# Patient Record
Sex: MALE | Race: WHITE | Employment: OTHER | URBAN - METROPOLITAN AREA
[De-identification: names, ages, dates, MRNs, and addresses within clinical notes are randomized per-mention and may not be internally consistent; named-entity substitution may affect disease eponyms.]

---

## 2022-03-11 ENCOUNTER — APPOINTMENT (OUTPATIENT)
Dept: GENERAL RADIOLOGY | Age: 87
DRG: 087 | End: 2022-03-11
Payer: MEDICARE

## 2022-03-11 ENCOUNTER — APPOINTMENT (OUTPATIENT)
Dept: CT IMAGING | Age: 87
DRG: 087 | End: 2022-03-11
Payer: MEDICARE

## 2022-03-11 ENCOUNTER — HOSPITAL ENCOUNTER (INPATIENT)
Age: 87
LOS: 2 days | Discharge: HOME | DRG: 087 | End: 2022-03-13
Attending: EMERGENCY MEDICINE | Admitting: SURGERY
Payer: MEDICARE

## 2022-03-11 DIAGNOSIS — I10 HYPERTENSION, UNSPECIFIED TYPE: ICD-10-CM

## 2022-03-11 DIAGNOSIS — S09.90XA INJURY OF HEAD, INITIAL ENCOUNTER: ICD-10-CM

## 2022-03-11 DIAGNOSIS — S06.5XAA SUBDURAL HEMATOMA: ICD-10-CM

## 2022-03-11 DIAGNOSIS — S01.01XA LACERATION OF SCALP, INITIAL ENCOUNTER: Primary | ICD-10-CM

## 2022-03-11 LAB
ABO/RH: NORMAL
ALBUMIN SERPL-MCNC: 3.9 G/DL (ref 3.5–5.2)
ALP BLD-CCNC: 144 U/L (ref 40–129)
ALT SERPL-CCNC: 19 U/L (ref 0–40)
ANION GAP SERPL CALCULATED.3IONS-SCNC: 12 MMOL/L (ref 7–16)
ANTIBODY SCREEN: NORMAL
APTT: 28 SEC (ref 24.5–35.1)
AST SERPL-CCNC: 31 U/L (ref 0–39)
BASOPHILS ABSOLUTE: 0.07 E9/L (ref 0–0.2)
BASOPHILS RELATIVE PERCENT: 0.7 % (ref 0–2)
BILIRUB SERPL-MCNC: 0.5 MG/DL (ref 0–1.2)
BUN BLDV-MCNC: 24 MG/DL (ref 6–23)
CALCIUM SERPL-MCNC: 9.2 MG/DL (ref 8.6–10.2)
CHLORIDE BLD-SCNC: 103 MMOL/L (ref 98–107)
CO2: 24 MMOL/L (ref 22–29)
CREAT SERPL-MCNC: 1.5 MG/DL (ref 0.7–1.2)
EOSINOPHILS ABSOLUTE: 0.59 E9/L (ref 0.05–0.5)
EOSINOPHILS RELATIVE PERCENT: 5.5 % (ref 0–6)
GFR AFRICAN AMERICAN: 53
GFR NON-AFRICAN AMERICAN: 44 ML/MIN/1.73
GLUCOSE BLD-MCNC: 100 MG/DL (ref 74–99)
HCT VFR BLD CALC: 36.8 % (ref 37–54)
HEMOGLOBIN: 12.1 G/DL (ref 12.5–16.5)
IMMATURE GRANULOCYTES #: 0.05 E9/L
IMMATURE GRANULOCYTES %: 0.5 % (ref 0–5)
INR BLD: 1
LYMPHOCYTES ABSOLUTE: 2.51 E9/L (ref 1.5–4)
LYMPHOCYTES RELATIVE PERCENT: 23.6 % (ref 20–42)
MCH RBC QN AUTO: 31.2 PG (ref 26–35)
MCHC RBC AUTO-ENTMCNC: 32.9 % (ref 32–34.5)
MCV RBC AUTO: 94.8 FL (ref 80–99.9)
MONOCYTES ABSOLUTE: 1.31 E9/L (ref 0.1–0.95)
MONOCYTES RELATIVE PERCENT: 12.3 % (ref 2–12)
NEUTROPHILS ABSOLUTE: 6.11 E9/L (ref 1.8–7.3)
NEUTROPHILS RELATIVE PERCENT: 57.4 % (ref 43–80)
PDW BLD-RTO: 14 FL (ref 11.5–15)
PLATELET # BLD: 127 E9/L (ref 130–450)
PMV BLD AUTO: 10.6 FL (ref 7–12)
POTASSIUM REFLEX MAGNESIUM: 4.4 MMOL/L (ref 3.5–5)
PROTHROMBIN TIME: 11.5 SEC (ref 9.3–12.4)
RBC # BLD: 3.88 E12/L (ref 3.8–5.8)
SODIUM BLD-SCNC: 139 MMOL/L (ref 132–146)
TOTAL PROTEIN: 7.3 G/DL (ref 6.4–8.3)
WBC # BLD: 10.6 E9/L (ref 4.5–11.5)

## 2022-03-11 PROCEDURE — 96376 TX/PRO/DX INJ SAME DRUG ADON: CPT

## 2022-03-11 PROCEDURE — 99222 1ST HOSP IP/OBS MODERATE 55: CPT | Performed by: NEUROLOGICAL SURGERY

## 2022-03-11 PROCEDURE — 96372 THER/PROPH/DIAG INJ SC/IM: CPT

## 2022-03-11 PROCEDURE — 2580000003 HC RX 258: Performed by: STUDENT IN AN ORGANIZED HEALTH CARE EDUCATION/TRAINING PROGRAM

## 2022-03-11 PROCEDURE — 72170 X-RAY EXAM OF PELVIS: CPT

## 2022-03-11 PROCEDURE — 6360000002 HC RX W HCPCS: Performed by: STUDENT IN AN ORGANIZED HEALTH CARE EDUCATION/TRAINING PROGRAM

## 2022-03-11 PROCEDURE — 99285 EMERGENCY DEPT VISIT HI MDM: CPT

## 2022-03-11 PROCEDURE — 85610 PROTHROMBIN TIME: CPT

## 2022-03-11 PROCEDURE — 85025 COMPLETE CBC W/AUTO DIFF WBC: CPT

## 2022-03-11 PROCEDURE — 90471 IMMUNIZATION ADMIN: CPT | Performed by: STUDENT IN AN ORGANIZED HEALTH CARE EDUCATION/TRAINING PROGRAM

## 2022-03-11 PROCEDURE — 2060000000 HC ICU INTERMEDIATE R&B

## 2022-03-11 PROCEDURE — 70450 CT HEAD/BRAIN W/O DYE: CPT

## 2022-03-11 PROCEDURE — 93005 ELECTROCARDIOGRAM TRACING: CPT

## 2022-03-11 PROCEDURE — 86900 BLOOD TYPING SEROLOGIC ABO: CPT

## 2022-03-11 PROCEDURE — 96374 THER/PROPH/DIAG INJ IV PUSH: CPT

## 2022-03-11 PROCEDURE — 6370000000 HC RX 637 (ALT 250 FOR IP): Performed by: STUDENT IN AN ORGANIZED HEALTH CARE EDUCATION/TRAINING PROGRAM

## 2022-03-11 PROCEDURE — 96375 TX/PRO/DX INJ NEW DRUG ADDON: CPT

## 2022-03-11 PROCEDURE — 0HQ0XZZ REPAIR SCALP SKIN, EXTERNAL APPROACH: ICD-10-PCS | Performed by: SURGERY

## 2022-03-11 PROCEDURE — 71045 X-RAY EXAM CHEST 1 VIEW: CPT

## 2022-03-11 PROCEDURE — 12002 RPR S/N/AX/GEN/TRNK2.6-7.5CM: CPT

## 2022-03-11 PROCEDURE — 72125 CT NECK SPINE W/O DYE: CPT

## 2022-03-11 PROCEDURE — 36415 COLL VENOUS BLD VENIPUNCTURE: CPT

## 2022-03-11 PROCEDURE — 90714 TD VACC NO PRESV 7 YRS+ IM: CPT | Performed by: STUDENT IN AN ORGANIZED HEALTH CARE EDUCATION/TRAINING PROGRAM

## 2022-03-11 PROCEDURE — 86901 BLOOD TYPING SEROLOGIC RH(D): CPT

## 2022-03-11 PROCEDURE — 80053 COMPREHEN METABOLIC PANEL: CPT

## 2022-03-11 PROCEDURE — 2500000003 HC RX 250 WO HCPCS: Performed by: STUDENT IN AN ORGANIZED HEALTH CARE EDUCATION/TRAINING PROGRAM

## 2022-03-11 PROCEDURE — 85730 THROMBOPLASTIN TIME PARTIAL: CPT

## 2022-03-11 PROCEDURE — 93005 ELECTROCARDIOGRAM TRACING: CPT | Performed by: EMERGENCY MEDICINE

## 2022-03-11 PROCEDURE — 86850 RBC ANTIBODY SCREEN: CPT

## 2022-03-11 RX ORDER — ATORVASTATIN CALCIUM 20 MG/1
20 TABLET, FILM COATED ORAL NIGHTLY
COMMUNITY

## 2022-03-11 RX ORDER — TETANUS AND DIPHTHERIA TOXOIDS ADSORBED 2; 2 [LF]/.5ML; [LF]/.5ML
0.5 INJECTION INTRAMUSCULAR ONCE
Status: COMPLETED | OUTPATIENT
Start: 2022-03-11 | End: 2022-03-11

## 2022-03-11 RX ORDER — FAMOTIDINE 20 MG/1
20 TABLET, FILM COATED ORAL EVERY MORNING
COMMUNITY

## 2022-03-11 RX ORDER — HYDROCODONE BITARTRATE AND ACETAMINOPHEN 5; 325 MG/1; MG/1
1 TABLET ORAL EVERY 12 HOURS PRN
Status: ON HOLD | COMMUNITY
End: 2022-07-13 | Stop reason: HOSPADM

## 2022-03-11 RX ORDER — LEVETIRACETAM 10 MG/ML
1000 INJECTION INTRAVASCULAR ONCE
Status: COMPLETED | OUTPATIENT
Start: 2022-03-11 | End: 2022-03-11

## 2022-03-11 RX ORDER — TRAVOPROST OPHTHALMIC SOLUTION 0.04 MG/ML
1 SOLUTION OPHTHALMIC NIGHTLY
COMMUNITY

## 2022-03-11 RX ORDER — CHOLECALCIFEROL (VITAMIN D3) 125 MCG
2000 CAPSULE ORAL EVERY MORNING
COMMUNITY

## 2022-03-11 RX ORDER — BRIMONIDINE TARTRATE, TIMOLOL MALEATE 2; 5 MG/ML; MG/ML
1 SOLUTION/ DROPS OPHTHALMIC 2 TIMES DAILY
COMMUNITY

## 2022-03-11 RX ORDER — CYANOCOBALAMIN (VITAMIN B-12) 500 MCG
0.8 TABLET ORAL EVERY MORNING
COMMUNITY

## 2022-03-11 RX ORDER — LEVETIRACETAM 500 MG/1
500 TABLET ORAL 2 TIMES DAILY
Status: DISCONTINUED | OUTPATIENT
Start: 2022-03-11 | End: 2022-03-13 | Stop reason: HOSPADM

## 2022-03-11 RX ORDER — LORATADINE 10 MG/1
10 TABLET ORAL DAILY PRN
COMMUNITY
End: 2022-09-08

## 2022-03-11 RX ORDER — SODIUM CHLORIDE 9 MG/ML
INJECTION, SOLUTION INTRAVENOUS CONTINUOUS
Status: DISCONTINUED | OUTPATIENT
Start: 2022-03-11 | End: 2022-03-12

## 2022-03-11 RX ORDER — LANOLIN ALCOHOL/MO/W.PET/CERES
1000 CREAM (GRAM) TOPICAL EVERY MORNING
COMMUNITY

## 2022-03-11 RX ORDER — LABETALOL HYDROCHLORIDE 5 MG/ML
10 INJECTION, SOLUTION INTRAVENOUS ONCE
Status: COMPLETED | OUTPATIENT
Start: 2022-03-11 | End: 2022-03-11

## 2022-03-11 RX ORDER — HYDRALAZINE HYDROCHLORIDE 20 MG/ML
10 INJECTION INTRAMUSCULAR; INTRAVENOUS EVERY 4 HOURS PRN
Status: DISCONTINUED | OUTPATIENT
Start: 2022-03-11 | End: 2022-03-13 | Stop reason: HOSPADM

## 2022-03-11 RX ORDER — ZINC OXIDE AND DIMETHICONE 120; 10 MG/G; MG/G
CREAM TOPICAL DAILY PRN
COMMUNITY
End: 2022-09-08

## 2022-03-11 RX ORDER — MULTIVITAMIN WITH IRON
100 TABLET ORAL EVERY MORNING
COMMUNITY

## 2022-03-11 RX ORDER — LABETALOL HYDROCHLORIDE 5 MG/ML
10 INJECTION, SOLUTION INTRAVENOUS EVERY 4 HOURS PRN
Status: DISCONTINUED | OUTPATIENT
Start: 2022-03-11 | End: 2022-03-13 | Stop reason: HOSPADM

## 2022-03-11 RX ORDER — LOPERAMIDE HYDROCHLORIDE 2 MG/1
2 CAPSULE ORAL PRN
Status: ON HOLD | COMMUNITY
End: 2022-05-05 | Stop reason: HOSPADM

## 2022-03-11 RX ORDER — ACETAMINOPHEN 325 MG/1
650 TABLET ORAL EVERY 4 HOURS PRN
Status: ON HOLD | COMMUNITY
End: 2022-05-05 | Stop reason: HOSPADM

## 2022-03-11 RX ADMIN — TETANUS AND DIPHTHERIA TOXOIDS ADSORBED 0.5 ML: 2; 2 INJECTION INTRAMUSCULAR at 18:14

## 2022-03-11 RX ADMIN — LEVETIRACETAM 1000 MG: 1000 INJECTION, SOLUTION INTRAVENOUS at 18:57

## 2022-03-11 RX ADMIN — HYDRALAZINE HYDROCHLORIDE 10 MG: 20 INJECTION INTRAMUSCULAR; INTRAVENOUS at 20:09

## 2022-03-11 RX ADMIN — LABETALOL HYDROCHLORIDE 10 MG: 5 INJECTION INTRAVENOUS at 19:29

## 2022-03-11 RX ADMIN — LABETALOL HYDROCHLORIDE 10 MG: 5 INJECTION INTRAVENOUS at 18:56

## 2022-03-11 RX ADMIN — SODIUM CHLORIDE: 9 INJECTION, SOLUTION INTRAVENOUS at 20:14

## 2022-03-11 RX ADMIN — LEVETIRACETAM 500 MG: 500 TABLET, FILM COATED ORAL at 22:55

## 2022-03-11 ASSESSMENT — PAIN DESCRIPTION - LOCATION
LOCATION: HEAD
LOCATION: HEAD

## 2022-03-11 ASSESSMENT — ENCOUNTER SYMPTOMS
DIARRHEA: 0
NAUSEA: 0
SINUS PRESSURE: 0
COUGH: 0
SINUS PAIN: 0
CONSTIPATION: 0
ANAL BLEEDING: 0
VOMITING: 0
EYE DISCHARGE: 0
CHEST TIGHTNESS: 0
RHINORRHEA: 0
SHORTNESS OF BREATH: 0
ABDOMINAL PAIN: 0
BACK PAIN: 0
ABDOMINAL DISTENTION: 0

## 2022-03-11 ASSESSMENT — PAIN SCALES - GENERAL
PAINLEVEL_OUTOF10: 4
PAINLEVEL_OUTOF10: 4

## 2022-03-11 ASSESSMENT — PAIN DESCRIPTION - DESCRIPTORS: DESCRIPTORS: ACHING

## 2022-03-11 ASSESSMENT — PAIN DESCRIPTION - PAIN TYPE
TYPE: ACUTE PAIN
TYPE: ACUTE PAIN

## 2022-03-11 ASSESSMENT — PAIN - FUNCTIONAL ASSESSMENT: PAIN_FUNCTIONAL_ASSESSMENT: 0-10

## 2022-03-11 ASSESSMENT — PAIN DESCRIPTION - FREQUENCY: FREQUENCY: CONTINUOUS

## 2022-03-11 NOTE — ED PROVIDER NOTES
HPI     Patient is a 80 y.o. male presents with a chief complaint of fall. This has been occurring for a few hours. Patient states that it gets better with ntohing. Patient states that it gets worse with nothing. Patient states that it is moderate in severity. Patient states it was acute in onset. Patient presents as a fall. Patient was at his assisted living facility and walked in to the sink and fell backwards after he lost his balance. Patient at the back of his head on the wall. Patient has a laceration on the back of his head. Patient was found by a nurse after falling immediately. Patient was able to get up after that. Patient denies any focal neurological deficits. Patient denies having any other pain. Patient is unsure when his last tetanus shot was. Review of Systems   Constitutional: Negative for activity change, appetite change, fatigue and fever. HENT: Negative for congestion, rhinorrhea, sinus pressure and sinus pain. Eyes: Negative for discharge. Respiratory: Negative for cough, chest tightness and shortness of breath. Cardiovascular: Negative for chest pain and palpitations. Gastrointestinal: Negative for abdominal distention, abdominal pain, anal bleeding, constipation, diarrhea, nausea and vomiting. Endocrine: Negative for polydipsia and polyuria. Genitourinary: Negative for decreased urine volume, difficulty urinating, enuresis, flank pain, frequency and urgency. Musculoskeletal: Negative for arthralgias, back pain and neck stiffness. Skin: Negative for rash and wound. Neurological: Positive for headaches. Negative for dizziness, weakness and light-headedness. Psychiatric/Behavioral: Negative for agitation, behavioral problems and confusion. Physical Exam  Vitals and nursing note reviewed. Constitutional:       General: He is not in acute distress. Appearance: He is well-developed. He is not ill-appearing.    HENT:      Head: Normocephalic and atraumatic. Eyes:      Conjunctiva/sclera: Conjunctivae normal.   Cardiovascular:      Rate and Rhythm: Normal rate and regular rhythm. Heart sounds: Normal heart sounds. No murmur heard. Pulmonary:      Effort: Pulmonary effort is normal. No respiratory distress. Breath sounds: Normal breath sounds. No wheezing or rales. Abdominal:      General: Bowel sounds are normal.      Palpations: Abdomen is soft. Tenderness: There is no abdominal tenderness. There is no guarding or rebound. Musculoskeletal:         General: No tenderness or deformity. Cervical back: Normal range of motion and neck supple. Skin:     General: Skin is warm and dry. Comments: Patient is a 3 cm laceration on the back of the head. Appears to be clean. No active bleeding. Neurological:      Mental Status: He is alert and oriented to person, place, and time. Cranial Nerves: No cranial nerve deficit. Coordination: Coordination normal.          Lac Repair    Date/Time: 3/11/2022 10:19 PM  Performed by: Cheli Pratt MD  Authorized by: Jeferson Wu MD     Consent:     Consent obtained:  Verbal    Consent given by:  Patient    Risks discussed:  Infection, need for additional repair, nerve damage, poor cosmetic result, pain, poor wound healing, vascular damage, retained foreign body and tendon damage    Alternatives discussed:  No treatment  Anesthesia (see MAR for exact dosages):      Anesthesia method:  Local infiltration    Local anesthetic:  Lidocaine 1% w/o epi  Laceration details:     Location:  Scalp    Scalp location:  Crown    Length (cm):  4  Repair type:     Repair type:  Simple  Pre-procedure details:     Preparation:  Patient was prepped and draped in usual sterile fashion  Exploration:     Hemostasis achieved with:  Direct pressure    Wound extent: no areolar tissue violation noted, no fascia violation noted, no foreign bodies/material noted, no muscle damage noted, no nerve damage noted, no tendon damage noted, no underlying fracture noted and no vascular damage noted    Treatment:     Area cleansed with:  Saline    Amount of cleaning:  Standard    Irrigation solution:  Sterile saline    Irrigation method:  Syringe  Skin repair:     Repair method:  Staples    Number of staples:  5  Approximation:     Approximation:  Close  Post-procedure details:     Dressing:  Open (no dressing)    Patient tolerance of procedure: Tolerated well, no immediate complications         MDM     ED Course as of 03/11/22 2218   Zuleika Hush Mar 11, 2022   1849 Patient was noted to have a subdural on CT scan. Trauma was consulted. Patient was ordered Keppra labetalol. Patient had labs drawn. [JM]   1946 5 staples placed without difficulty. [JM]      ED Course User Index  [JM] Sung Elizondo MD      Patient is a 80 y.o. male presenting with mechanical fall. Patient was taken to CT scanner for CT head and CT C-spine CT scan indicated that patient had an acute on chronic subdural hematoma. Patient had trauma surgery immediately consulted. Patient remained hemodynamically stable. Patient takes aspirin at home. Patient then had coagulation studies ordered. Patient was noted to be significantly hypertensive and was given multiple doses of labetalol. Patient's blood pressure improved. Patient had the laceration on the back of his head stapled. Patient was given tetanus. Discussed with trauma surgery and patient will be admitted. Patient was seen and evaluated by myself and my attending Celeste Magaña MD. Assessment and Plan discussed with attending provider, please see attestation for final plan of care. This note was done using dictation software and there may be some grammatical errors associated with this. Sung Elizondo MD     ED Course as of 03/11/22 2233   Fri Mar 11, 2022   1849 Patient was noted to have a subdural on CT scan. Trauma was consulted. Patient was ordered Keppra labetalol.   Patient had labs drawn. [LISETH]   1946 5 staples placed without difficulty. [LISETH]      ED Course User Index  [LISETH] Ny Guajardo MD       --------------------------------------------- PAST HISTORY ---------------------------------------------  Past Medical History:  has a past medical history of Hyperlipidemia. Past Surgical History:  has a past surgical history that includes Coronary artery bypass graft and back surgery. Social History:  reports that he has never smoked. He has never used smokeless tobacco. He reports current alcohol use. He reports that he does not use drugs. Family History: family history is not on file. The patients home medications have been reviewed. Allergies: Patient has no known allergies.     -------------------------------------------------- RESULTS -------------------------------------------------    Lab  Results for orders placed or performed during the hospital encounter of 03/11/22   CBC with Auto Differential   Result Value Ref Range    WBC 10.6 4.5 - 11.5 E9/L    RBC 3.88 3.80 - 5.80 E12/L    Hemoglobin 12.1 (L) 12.5 - 16.5 g/dL    Hematocrit 36.8 (L) 37.0 - 54.0 %    MCV 94.8 80.0 - 99.9 fL    MCH 31.2 26.0 - 35.0 pg    MCHC 32.9 32.0 - 34.5 %    RDW 14.0 11.5 - 15.0 fL    Platelets 911 (L) 271 - 450 E9/L    MPV 10.6 7.0 - 12.0 fL    Neutrophils % 57.4 43.0 - 80.0 %    Immature Granulocytes % 0.5 0.0 - 5.0 %    Lymphocytes % 23.6 20.0 - 42.0 %    Monocytes % 12.3 (H) 2.0 - 12.0 %    Eosinophils % 5.5 0.0 - 6.0 %    Basophils % 0.7 0.0 - 2.0 %    Neutrophils Absolute 6.11 1.80 - 7.30 E9/L    Immature Granulocytes # 0.05 E9/L    Lymphocytes Absolute 2.51 1.50 - 4.00 E9/L    Monocytes Absolute 1.31 (H) 0.10 - 0.95 E9/L    Eosinophils Absolute 0.59 (H) 0.05 - 0.50 E9/L    Basophils Absolute 0.07 0.00 - 0.20 E9/L   Comprehensive Metabolic Panel w/ Reflex to MG   Result Value Ref Range    Sodium 139 132 - 146 mmol/L    Potassium reflex Magnesium 4.4 3.5 - 5.0 mmol/L    Chloride 103 98 - 107 mmol/L    CO2 24 22 - 29 mmol/L    Anion Gap 12 7 - 16 mmol/L    Glucose 100 (H) 74 - 99 mg/dL    BUN 24 (H) 6 - 23 mg/dL    CREATININE 1.5 (H) 0.7 - 1.2 mg/dL    GFR Non-African American 44 >=60 mL/min/1.73    GFR African American 53     Calcium 9.2 8.6 - 10.2 mg/dL    Total Protein 7.3 6.4 - 8.3 g/dL    Albumin 3.9 3.5 - 5.2 g/dL    Total Bilirubin 0.5 0.0 - 1.2 mg/dL    Alkaline Phosphatase 144 (H) 40 - 129 U/L    ALT 19 0 - 40 U/L    AST 31 0 - 39 U/L   Protime-INR   Result Value Ref Range    Protime 11.5 9.3 - 12.4 sec    INR 1.0    APTT   Result Value Ref Range    aPTT 28.0 24.5 - 35.1 sec   TYPE AND SCREEN   Result Value Ref Range    ABO/Rh O POS     Antibody Screen NEG        Radiology  CT HEAD WO CONTRAST   Final Result   Left posterior parietal subdural hematoma measuring up to 9 mm with no   midline shift and unchanged in size compared to prior study although this   appears slightly more hyperdense compared to 06/30 4 p.m. Thomas Case Thomas Case Possible slight   extension across the left posterior falx compared to prior study. XR PELVIS (1-2 VIEWS)   Final Result   No acute abnormality of the pelvis. XR CHEST PORTABLE   Final Result   No evidence of acute trauma. CT Head WO Contrast   Final Result   Left posterior parietal subacute/acute subdural hematoma measuring up to 1.5   cm. No midline shift. No prior exams for comparison. Findings discussed on 3/11/22 with Dr. James Darby at 6:53 p.m. Thomas Case CT Cervical Spine WO Contrast   Final Result   No acute abnormality of the cervical spine. Multilevel degenerative changes with grade 1 anterolisthesis C2 over C3 and   C7 over T1.                 ------------------------- NURSING NOTES AND VITALS REVIEWED ---------------------------  Date / Time Roomed:  3/11/2022  5:41 PM  ED Bed Assignment:  10/10    The nursing notes within the ED encounter and vital signs as below have been reviewed.    Patient Vitals for the past 24 hrs:   BP Temp Temp src Pulse Resp SpO2 Height Weight   03/11/22 2055 123/75 -- -- 86 18 98 % -- --   03/11/22 2012 (!) 178/80 -- -- 78 21 -- -- --   03/11/22 2001 (!) 164/82 -- -- 78 25 -- -- --   03/11/22 1945 (!) 157/85 -- -- 78 22 95 % -- --   03/11/22 1938 (!) 157/76 -- -- 78 18 92 % -- --   03/11/22 1930 (!) 166/89 -- -- 77 (!) 31 93 % -- --   03/11/22 1915 (!) 185/93 -- -- 76 23 92 % -- --   03/11/22 1902 (!) 158/80 -- -- 74 18 95 % -- --   03/11/22 1852 (!) 192/98 -- -- -- -- -- -- --   03/11/22 1758 (!) 205/100 97.2 °F (36.2 °C) Oral 76 16 96 % 5' 11\" (1.803 m) 165 lb (74.8 kg)   03/11/22 1753 (!) 210/105 97.2 °F (36.2 °C) -- 78 18 95 % -- --       Oxygen Saturation Interpretation: Normal      ------------------------------------------ PROGRESS NOTES ------------------------------------------  Re-evaluation(s):   Patients symptoms are improving       Patients symptoms are improving  Repeat physical examination is improved    I have spoken with the patient and discussed todays results, in addition to providing specific details for the plan of care and counseling regarding the diagnosis and prognosis. Their questions are answered at this time and they are agreeable with the plan.      --------------------------------- ADDITIONAL PROVIDER NOTES ---------------------------------  Consultations:  Spoke with Dr. Jayson Kennedy,  They will admit this patient. This patient's ED course included: a personal history and physicial examination, re-evaluation prior to disposition, multiple bedside re-evaluations, IV medications, cardiac monitoring, continuous pulse oximetry and complex medical decision making and emergency management    This patient has remained hemodynamically stable during their ED course. Please note that the withdrawal or failure to initiate urgent interventions for this patient would likely result in a life threatening deterioration or permanent disability.       Accordingly this patient received   35 minutes of critical care time, excluding separately billable procedures. Clinical Impression  1. Laceration of scalp, initial encounter    2. Injury of head, initial encounter    3. Subdural hematoma ([facility] [address]    4. Hypertension, unspecified type          Disposition  Patient's disposition: Admit to ICU  Patient's condition is stable.          Dimas Avina MD  Resident  03/11/22 9473

## 2022-03-12 ENCOUNTER — APPOINTMENT (OUTPATIENT)
Dept: CT IMAGING | Age: 87
DRG: 087 | End: 2022-03-12
Payer: MEDICARE

## 2022-03-12 LAB
ANGLE (CLOT STRENGTH): 73 DEGREE (ref 59–74)
ANION GAP SERPL CALCULATED.3IONS-SCNC: 12 MMOL/L (ref 7–16)
BASOPHILS ABSOLUTE: 0.07 E9/L (ref 0–0.2)
BASOPHILS RELATIVE PERCENT: 0.7 % (ref 0–2)
BUN BLDV-MCNC: 23 MG/DL (ref 6–23)
CALCIUM SERPL-MCNC: 8.7 MG/DL (ref 8.6–10.2)
CHLORIDE BLD-SCNC: 103 MMOL/L (ref 98–107)
CO2: 23 MMOL/L (ref 22–29)
CREAT SERPL-MCNC: 1.4 MG/DL (ref 0.7–1.2)
EOSINOPHILS ABSOLUTE: 0.57 E9/L (ref 0.05–0.5)
EOSINOPHILS RELATIVE PERCENT: 5.7 % (ref 0–6)
EPL-TEG: 0.5 % (ref 0–15)
G-TEG: 11 K D/SC (ref 4.5–11)
GFR AFRICAN AMERICAN: 57
GFR NON-AFRICAN AMERICAN: 47 ML/MIN/1.73
GLUCOSE BLD-MCNC: 105 MG/DL (ref 74–99)
HCT VFR BLD CALC: 34.7 % (ref 37–54)
HEMOGLOBIN: 11.5 G/DL (ref 12.5–16.5)
IMMATURE GRANULOCYTES #: 0.03 E9/L
IMMATURE GRANULOCYTES %: 0.3 % (ref 0–5)
K (CLOTTING TIME): 1.2 MIN (ref 1–3)
LY30 (FIBRINOLYSIS): 0.5 % (ref 0–8)
LYMPHOCYTES ABSOLUTE: 2.64 E9/L (ref 1.5–4)
LYMPHOCYTES RELATIVE PERCENT: 26.3 % (ref 20–42)
MA (MAX AMPLITUDE): 68.8 MM (ref 50–70)
MCH RBC QN AUTO: 31.4 PG (ref 26–35)
MCHC RBC AUTO-ENTMCNC: 33.1 % (ref 32–34.5)
MCV RBC AUTO: 94.8 FL (ref 80–99.9)
MONOCYTES ABSOLUTE: 1.21 E9/L (ref 0.1–0.95)
MONOCYTES RELATIVE PERCENT: 12.1 % (ref 2–12)
NEUTROPHILS ABSOLUTE: 5.5 E9/L (ref 1.8–7.3)
NEUTROPHILS RELATIVE PERCENT: 54.9 % (ref 43–80)
PDW BLD-RTO: 14 FL (ref 11.5–15)
PLATELET # BLD: 133 E9/L (ref 130–450)
PMV BLD AUTO: 11 FL (ref 7–12)
POTASSIUM REFLEX MAGNESIUM: 4.1 MMOL/L (ref 3.5–5)
R (REACTION TIME): 5.7 MIN (ref 5–10)
RBC # BLD: 3.66 E12/L (ref 3.8–5.8)
SODIUM BLD-SCNC: 138 MMOL/L (ref 132–146)
WBC # BLD: 10 E9/L (ref 4.5–11.5)

## 2022-03-12 PROCEDURE — 70450 CT HEAD/BRAIN W/O DYE: CPT

## 2022-03-12 PROCEDURE — 80048 BASIC METABOLIC PNL TOTAL CA: CPT

## 2022-03-12 PROCEDURE — 97530 THERAPEUTIC ACTIVITIES: CPT

## 2022-03-12 PROCEDURE — 85025 COMPLETE CBC W/AUTO DIFF WBC: CPT

## 2022-03-12 PROCEDURE — 6370000000 HC RX 637 (ALT 250 FOR IP): Performed by: STUDENT IN AN ORGANIZED HEALTH CARE EDUCATION/TRAINING PROGRAM

## 2022-03-12 PROCEDURE — 92523 SPEECH SOUND LANG COMPREHEN: CPT | Performed by: SPEECH-LANGUAGE PATHOLOGIST

## 2022-03-12 PROCEDURE — 97535 SELF CARE MNGMENT TRAINING: CPT

## 2022-03-12 PROCEDURE — 85384 FIBRINOGEN ACTIVITY: CPT

## 2022-03-12 PROCEDURE — 97161 PT EVAL LOW COMPLEX 20 MIN: CPT

## 2022-03-12 PROCEDURE — 97165 OT EVAL LOW COMPLEX 30 MIN: CPT

## 2022-03-12 PROCEDURE — 85576 BLOOD PLATELET AGGREGATION: CPT

## 2022-03-12 PROCEDURE — 2580000003 HC RX 258: Performed by: STUDENT IN AN ORGANIZED HEALTH CARE EDUCATION/TRAINING PROGRAM

## 2022-03-12 PROCEDURE — 36415 COLL VENOUS BLD VENIPUNCTURE: CPT

## 2022-03-12 PROCEDURE — 85347 COAGULATION TIME ACTIVATED: CPT

## 2022-03-12 PROCEDURE — 99223 1ST HOSP IP/OBS HIGH 75: CPT | Performed by: SURGERY

## 2022-03-12 PROCEDURE — 6360000002 HC RX W HCPCS: Performed by: STUDENT IN AN ORGANIZED HEALTH CARE EDUCATION/TRAINING PROGRAM

## 2022-03-12 PROCEDURE — 99232 SBSQ HOSP IP/OBS MODERATE 35: CPT | Performed by: NEUROLOGICAL SURGERY

## 2022-03-12 PROCEDURE — 2060000000 HC ICU INTERMEDIATE R&B

## 2022-03-12 RX ORDER — SODIUM CHLORIDE 0.9 % (FLUSH) 0.9 %
5-40 SYRINGE (ML) INJECTION EVERY 12 HOURS SCHEDULED
Status: DISCONTINUED | OUTPATIENT
Start: 2022-03-12 | End: 2022-03-13 | Stop reason: HOSPADM

## 2022-03-12 RX ORDER — SODIUM CHLORIDE 9 MG/ML
25 INJECTION, SOLUTION INTRAVENOUS PRN
Status: DISCONTINUED | OUTPATIENT
Start: 2022-03-12 | End: 2022-03-13 | Stop reason: HOSPADM

## 2022-03-12 RX ORDER — ATORVASTATIN CALCIUM 40 MG/1
40 TABLET, FILM COATED ORAL DAILY
Status: DISCONTINUED | OUTPATIENT
Start: 2022-03-12 | End: 2022-03-13 | Stop reason: HOSPADM

## 2022-03-12 RX ORDER — LEVETIRACETAM 500 MG/1
500 TABLET ORAL 2 TIMES DAILY
Qty: 12 TABLET | Refills: 0 | Status: SHIPPED | OUTPATIENT
Start: 2022-03-12 | End: 2022-03-13

## 2022-03-12 RX ORDER — SENNA AND DOCUSATE SODIUM 50; 8.6 MG/1; MG/1
1 TABLET, FILM COATED ORAL 2 TIMES DAILY
Status: DISCONTINUED | OUTPATIENT
Start: 2022-03-12 | End: 2022-03-13 | Stop reason: HOSPADM

## 2022-03-12 RX ORDER — ONDANSETRON 2 MG/ML
4 INJECTION INTRAMUSCULAR; INTRAVENOUS EVERY 6 HOURS PRN
Status: DISCONTINUED | OUTPATIENT
Start: 2022-03-12 | End: 2022-03-13 | Stop reason: HOSPADM

## 2022-03-12 RX ORDER — POLYETHYLENE GLYCOL 3350 17 G/17G
17 POWDER, FOR SOLUTION ORAL DAILY
Status: DISCONTINUED | OUTPATIENT
Start: 2022-03-12 | End: 2022-03-13 | Stop reason: HOSPADM

## 2022-03-12 RX ORDER — ACETAMINOPHEN 325 MG/1
650 TABLET ORAL EVERY 6 HOURS
Status: DISCONTINUED | OUTPATIENT
Start: 2022-03-12 | End: 2022-03-13 | Stop reason: HOSPADM

## 2022-03-12 RX ORDER — SODIUM CHLORIDE 0.9 % (FLUSH) 0.9 %
5-40 SYRINGE (ML) INJECTION PRN
Status: DISCONTINUED | OUTPATIENT
Start: 2022-03-12 | End: 2022-03-13 | Stop reason: HOSPADM

## 2022-03-12 RX ORDER — ONDANSETRON 4 MG/1
4 TABLET, ORALLY DISINTEGRATING ORAL EVERY 8 HOURS PRN
Status: DISCONTINUED | OUTPATIENT
Start: 2022-03-12 | End: 2022-03-13 | Stop reason: HOSPADM

## 2022-03-12 RX ADMIN — SODIUM CHLORIDE, PRESERVATIVE FREE 10 ML: 5 INJECTION INTRAVENOUS at 20:10

## 2022-03-12 RX ADMIN — SODIUM CHLORIDE, PRESERVATIVE FREE 10 ML: 5 INJECTION INTRAVENOUS at 08:31

## 2022-03-12 RX ADMIN — ACETAMINOPHEN 650 MG: 325 TABLET ORAL at 00:48

## 2022-03-12 RX ADMIN — ATORVASTATIN CALCIUM 40 MG: 40 TABLET, FILM COATED ORAL at 08:31

## 2022-03-12 RX ADMIN — ACETAMINOPHEN 650 MG: 325 TABLET ORAL at 11:49

## 2022-03-12 RX ADMIN — HYDRALAZINE HYDROCHLORIDE 10 MG: 20 INJECTION INTRAMUSCULAR; INTRAVENOUS at 20:09

## 2022-03-12 RX ADMIN — METOPROLOL TARTRATE 25 MG: 25 TABLET, FILM COATED ORAL at 20:09

## 2022-03-12 RX ADMIN — SENNOSIDES AND DOCUSATE SODIUM 1 TABLET: 8.6; 5 TABLET ORAL at 00:52

## 2022-03-12 RX ADMIN — METOPROLOL TARTRATE 25 MG: 25 TABLET, FILM COATED ORAL at 08:31

## 2022-03-12 RX ADMIN — LEVETIRACETAM 500 MG: 500 TABLET, FILM COATED ORAL at 20:09

## 2022-03-12 RX ADMIN — ACETAMINOPHEN 650 MG: 325 TABLET ORAL at 17:54

## 2022-03-12 RX ADMIN — ACETAMINOPHEN 650 MG: 325 TABLET ORAL at 06:49

## 2022-03-12 RX ADMIN — LEVETIRACETAM 500 MG: 500 TABLET, FILM COATED ORAL at 08:31

## 2022-03-12 ASSESSMENT — PAIN DESCRIPTION - DESCRIPTORS
DESCRIPTORS: SORE
DESCRIPTORS: SORE

## 2022-03-12 ASSESSMENT — PAIN DESCRIPTION - PROGRESSION
CLINICAL_PROGRESSION: NOT CHANGED
CLINICAL_PROGRESSION: NOT CHANGED

## 2022-03-12 ASSESSMENT — PAIN DESCRIPTION - LOCATION
LOCATION: HEAD
LOCATION: HEAD;NECK

## 2022-03-12 ASSESSMENT — PAIN DESCRIPTION - FREQUENCY
FREQUENCY: CONTINUOUS
FREQUENCY: CONTINUOUS

## 2022-03-12 ASSESSMENT — PAIN SCALES - GENERAL
PAINLEVEL_OUTOF10: 3
PAINLEVEL_OUTOF10: 0
PAINLEVEL_OUTOF10: 4
PAINLEVEL_OUTOF10: 0
PAINLEVEL_OUTOF10: 3

## 2022-03-12 ASSESSMENT — PAIN - FUNCTIONAL ASSESSMENT
PAIN_FUNCTIONAL_ASSESSMENT: ACTIVITIES ARE NOT PREVENTED
PAIN_FUNCTIONAL_ASSESSMENT: ACTIVITIES ARE NOT PREVENTED

## 2022-03-12 ASSESSMENT — PAIN DESCRIPTION - PAIN TYPE
TYPE: ACUTE PAIN
TYPE: ACUTE PAIN

## 2022-03-12 ASSESSMENT — PAIN DESCRIPTION - ONSET
ONSET: ON-GOING
ONSET: ON-GOING

## 2022-03-12 NOTE — H&P
TRAUMA HISTORY & PHYSICAL  Surgical Resident/Advance Practice Nurse  3/11/2022  8:47 PM    PRIMARY SURVEY    CHIEF COMPLAINT:  Trauma consult. Injury occurred just prior to arrival. Patient had mechanical fall from standing. He hit his head on his bathroom sink. He denies LOC. He has a 4 cm scalp laceration that was stapled by ED. He reports mild headache. He is GCS 15 and neurologically intact. He is on aspirin. AIRWAY:   Airway Normal  EMS ETT Absent  Noisy respirations Absent  Retractions: Absent  Vomiting/bleeding: Absent      BREATHING:    Midaxillary breath sound left:  Normal  Midaxillary breath sound right:  Normal    Cough sound intensity:  good   FiO2:  Room air    SMI 1250 mL. CIRCULATION:   Femerol pulse intensity: Strong  Palpebral conjunctiva: Pink     Vitals:    03/11/22 2012   BP: (!) 178/80   Pulse: 78   Resp: 21   Temp:    SpO2:        Vitals:    03/11/22 1938 03/11/22 1945 03/11/22 2001 03/11/22 2012   BP: (!) 157/76 (!) 157/85 (!) 164/82 (!) 178/80   Pulse: 78 78 78 78   Resp: 18 22 25 21   Temp:       TempSrc:       SpO2: 92% 95%     Weight:       Height:            FAST EXAM: Deferred    Central Nervous System    GCS Initial 15 minutes   Eye  Motor  Verbal 4 - Opens eyes on own  6 - Follows simple motor commands  5 - Alert and oriented 4 - Opens eyes on own  6 - Follows simple motor commands  5 - Alert and oriented     Neuromuscular blockade: No  Pupil size:  Left 3 mm    Right 3 mm  Pupil reaction: Yes    Wiggles fingers: Left Yes Right Yes  Wiggles toes: Left Yes   Right Yes    Hand grasp:   Left  Present      Right  Present  Plantar flexion: Left  Present      Right   Present    Loss of consciousness:  No    History Obtained From:  Patient & chart review  Private Medical Doctor: No primary care provider on file.     Pre-exisiting Medical History:  yes    Conditions:   Past Medical History:   Diagnosis Date    Hyperlipidemia      Medications:   No current facility-administered medications on file prior to encounter. Current Outpatient Medications on File Prior to Encounter   Medication Sig Dispense Refill    HYDROcodone-acetaminophen (NORCO) 5-325 MG per tablet Take 1 tablet by mouth every 12 hours as needed for Pain.       loratadine (CLARITIN) 10 MG tablet Take 10 mg by mouth daily as needed (allergies)      Alum & Mag Hydroxide-Simeth (MYLANTA PO) Take 30 mLs by mouth every 4 hours as needed (epigastric discomfort)      Dextromethorphan-guaiFENesin (TUSSIN DM PO) Take 5-10 mLs by mouth every 4 hours as needed (cough)      magnesium hydroxide (MILK OF MAGNESIA) 400 MG/5ML suspension Take 30 mLs by mouth daily as needed for Constipation      loperamide (IMODIUM) 2 MG capsule Take 2 mg by mouth as needed for Diarrhea      bisacodyl (DULCOLAX) 5 MG EC tablet Take 5 mg by mouth daily as needed for Constipation      acetaminophen (TYLENOL) 325 MG tablet Take 650 mg by mouth every 4 hours as needed for Pain      brimonidine-timolol (COMBIGAN) 0.2-0.5 % ophthalmic solution Place 1 drop into both eyes every 12 hours      Cholecalciferol (VITAMIN D3) 50 MCG (2000 UT) CAPS Take 1 capsule by mouth daily      famotidine (PEPCID) 20 MG tablet Take 20 mg by mouth daily 3 pm      vitamin B-12 (CYANOCOBALAMIN) 1000 MCG tablet Take 1,000 mcg by mouth daily      vitamin B-6 (PYRIDOXINE) 100 MG tablet Take 100 mg by mouth daily      Folic Acid 0.8 MG CAPS Take 1 capsule by mouth daily      metoprolol tartrate (LOPRESSOR) 25 MG tablet Take 25 mg by mouth 2 times daily      atorvastatin (LIPITOR) 40 MG tablet Take 40 mg by mouth daily      Travoprost, BAK Free, (TRAVATAN Z) 0.004 % SOLN ophthalmic solution Place 1 drop into both eyes nightly      dimethicone-zinc oxide (DARIUS PROTECT) cream Apply topically daily as needed for Dry Skin       Allergies: No Known Allergies     Social History:   Tobacco use:  none  Alcohol use:  social drinker  Illicit drug use:  no history of illicit drug use    Past Surgical History:    Past Surgical History:   Procedure Laterality Date    BACK SURGERY      CABG WITH AORTIC VALVE REPAIR       Anticoagulant use: None  Antiplatelet use:   ASA    NSAID use in last 72 hours: no  Taken PCN in past:  yes  Last food/drink: this afternoon  Last tetanus: up-to-date    Family History:   No family history of anesthesia complications    Complaints:   Head:  Mild  Neck:   None  Chest:   None  Back:   None  Abdomen:   None  Extremities:   None  Comments: none    Review of systems:  All negative unless otherwise noted. SECONDARY SURVEY  Head/scalp: 4 cm posterior right scalp laceration with staples in place    Face: Atraumatic    Eyes/ears/nose: Atraumatic    Pharynx/mouth: Atraumatic    Neck: Atraumatic     Cervical spine tenderness:   Cervical collar not indicated  Pain:  none  ROM:  Normal    Chest wall:  Atraumatic    Heart:  Regular rate & rhythm    Abdomen: Atraumatic. Soft ND  Tenderness:  none    Pelvis: Atraumatic  Tenderness: none    Thoracolumbar spine: Atraumatic  Tenderness:  none    Genitourinary:  Atraumatic. No blood or urine noted    Rectum: Atraumatic. No blood noted. Perineum: Atraumatic. No blood or urine noted. Extremities:   Sensory normal  Motor normal    Distal Pulses  Left arm normal  Right arm normal  Left leg normal  Right leg normal    Capillary refill  Left arm normal  Right arm normal  Left leg normal  Right leg normal    Procedures in ED:  laceration repair by ED    In the event of Emergency Blood Transfusion:  Due to the critical condition of this patient, I request the immediate release of blood products for emergency transfusion secondary to shock. I understand the increased risks incurred by the lack of complete transfusion testing.       Radiology: Chest Xray , Pelvic Xray , CT Head  and CT Cervical spine     Consultations:  Neurosurgery    Admission/Diagnosis: SDH    Plan of Treatment:  Admit to monitored floor  Tert  Repeat CT head  TEG  Keppra 500 BID  Neurosurgery recommendations    Plan discussed with Dr. Saqib Sims at 3/11/2022 on 8:47 PM    Electronically signed by Joey Cat MD on 3/11/2022 at 8:47 PM

## 2022-03-12 NOTE — PROGRESS NOTES
CLINICAL PHARMACY NOTE: MEDS TO BEDS    Total # of Prescriptions Filled: 1   The following medications were delivered to the patient:  · levetiracetam 500 mg    Additional Documentation:

## 2022-03-12 NOTE — PROGRESS NOTES
Physical Therapy  Physical Therapy Initial Assessment     Name: Chelsea Fernandez  : 1930  MRN: [id number]      Date of Service: 3/12/2022    Evaluating PT:  Shaun Langston PT, DPT [id number]     Room #:  5205/6871-J  Diagnosis:  Subdural hematoma ([facility] [address] [A99.1K4B]  Injury of head, initial encounter [S09.90XA]  Laceration of scalp, initial encounter [S01.01XA]  Hypertension, unspecified type [I10]  Reason for admission: Fall, SDH   Precautions:  Falls  Procedure/Surgery:  None   Equipment Recommendations:  Return to use of rollator    SUBJECTIVE:  Pt lives at and assisted. Pt ambulated with rollator PTA. OBJECTIVE:   Initial Evaluation  Date: 3/12 Treatment   Short Term/ Long Term   Goals   AM-PAC 6 Clicks 61/01     Was pt agreeable to Eval/treatment? Yes      Does pt have pain?  Denies      Bed Mobility  Rolling: NT  Supine to sit: SBA  Sit to supine: NT  Scooting: SBA  Independent    Transfers Sit to stand: Jie  Stand to sit: Jie  Stand pivot: Jie [address]  Independent    Ambulation    60 and 25 feet with [address] Jie    >150 feet with AAD Mod I    Stair negotiation: ascended and descended  NT  NA     LE ROM: WFL    LE Strength:   knee ext: 5/5  ankle DF: 5/5    Balance:   Sitting static: Independent  Sitting dynamic: Supervision  Standing static: Jie  Standing dynamic: Jie [address]      -Pt is A & O x 3  -Sensation:  Pt reports numbness to B feet after hx of back sx   -Edema:  unremarkable     Therapeutic Exercises:  Functional activity     Patient education  Pt educated on safety, sequencing of transfers, and role of PT    Patient response to education:   Pt verbalized understanding Pt demonstrated skill Pt requires further education in this area   Yes  Yes  Yes      ASSESSMENT:  Conditions Requiring Skilled Therapeutic Intervention:  []Decreased strength     []Decreased ROM  [x]Decreased functional mobility  [x]Decreased balance   [x]Decreased endurance   []Decreased posture  [x]Decreased sensation  []Decreased coordination []Decreased vision  [x]Decreased safety awareness   []Increased pain       Comments:  Pt received supine in bed and agreeable to PT session   Pt initially felt like he was at his baseline but realized once ambulating that he \"felt off\". He is able to get out of bed without assistance. Light aid needed to stand d/t unsteadiness. Ambulation completed with use of walker needing assist for balance and moderate amount of verbal cues for proper walker use and safety. He is used to using a rollator for ambulation which caused part of his difficulty -- however, in addition to this, he is more unsteady than using with multiple mild losses of balance. Sitting in chair to end    Pt with all needs met and call light in reach. Pt would benefit from continued PT POC to address functional deficits described above. Treatment:  Patient practiced and was instructed in the following treatment:     Therapeutic activity  o Patient education provided continuously throughout session for sequencing, safety maintenance, and improving any deficits found during the evaluation. o Bed mobility training - pt given verbal and tactile cues to facilitate proper sequencing and safety during rolling and supine>sit as well as provided with physical assistance to complete task    o STS and pivot transfer training - pt educated on proper hand and foot placement, safety and sequencing, and use of proper technique to safely complete sit<>stand and pivot transfers with hands on assistance to complete task safely   o Gait training- pt was given verbal and tactile cues to facilitate normal speed and use of walker during ambulation as well as provided with physical assistance. Pt's/ family goals   1. Return to [facility]    Patient and or family understand(s) diagnosis, prognosis, and plan of care. yes    Prognosis is good for reaching above PT goals.     PHYSICAL THERAPY PLAN OF CARE:    PT POC is established based on physician order and patient minutes  [] Therapeutic exercises 64607 -- minutes  [] Neuromuscular reeducation [id number] - minutes     Peter Hdez, PT, DPT  [id number]

## 2022-03-12 NOTE — PROGRESS NOTES
Trauma Tertiary Survey    Admit Date: 3/11/2022  Hospital day 1    CC:  Mechanical fall SH    Alcohol pre-screening:  Men: How many times in the past year have you had 5 or more drinks in a day?  none  How much do you drink on a daily basis? Social drinker    Scheduled Meds:   atorvastatin  40 mg Oral Daily    metoprolol tartrate  25 mg Oral BID    sodium chloride flush  5-40 mL IntraVENous 2 times per day    polyethylene glycol  17 g Oral Daily    acetaminophen  650 mg Oral Q6H    sennosides-docusate sodium  1 tablet Oral BID    levETIRAcetam  500 mg Oral BID     Continuous Infusions:   sodium chloride       PRN Meds:sodium chloride flush, sodium chloride, ondansetron **OR** ondansetron, bisacodyl, hydrALAZINE, labetalol    Subjective:     Doing well. Endorses a mild headache, no changes in vision, neurologically intact. GCS 15. Posterior scalp laceration without signs of bleeding    Objective:     Patient Vitals for the past 8 hrs:   BP Temp Temp src Pulse Resp   03/12/22 0000 (!) 159/91 97.7 °F (36.5 °C) Oral 94 18       I/O last 3 completed shifts:  In: -   Out: 400 [Urine:400]  No intake/output data recorded. Past Medical History:   Diagnosis Date    Hyperlipidemia        @homemeds@    Radiology:  CT HEAD WO CONTRAST   Final Result   Left parietal subdural hematoma is similar to less conspicuous as described. No other significant change. Continued follow-up recommended. RECOMMENDATIONS:   Unavailable         CT HEAD WO CONTRAST   Final Result   Left posterior parietal subdural hematoma measuring up to 9 mm with no   midline shift and unchanged in size compared to prior study although this   appears slightly more hyperdense compared to 06/30 4 p.m. Jan Raoul Jan Raoul Possible slight   extension across the left posterior falx compared to prior study. XR PELVIS (1-2 VIEWS)   Final Result   No acute abnormality of the pelvis. XR CHEST PORTABLE   Final Result   No evidence of acute trauma. CT Head WO Contrast   Final Result   Left posterior parietal subacute/acute subdural hematoma measuring up to 1.5   cm. No midline shift. No prior exams for comparison. Findings discussed on 3/11/22 with Dr. Alyce Tovar at 6:53 p.m. Pola Henderson CT Cervical Spine WO Contrast   Final Result   No acute abnormality of the cervical spine. Multilevel degenerative changes with grade 1 anterolisthesis C2 over C3 and   C7 over T1.             PHYSICAL EXAM:     Central Nervous System  Loss of consciousness:  No    GCS:    Eye:  4 - Opens eyes on own  Motor:  6 - Follows simple motor commands  Verbal:  5 - Alert and oriented    Neuromuscular blockade: No  Pupil size:  Left 3 mm    Right 3 mm  Pupil reaction: Yes    Wiggles fingers: Left Yes Right Yes  Wiggles toes: Left Yes   Right Yes    Hand grasp:   Left  Present      Right  Present  Plantar flexion: Left  Present      Right   Present    PHYSICAL EXAM  General: No apparent distress, comfortable. HEENT: Trachea midline, no masses, Pupils equal round. Chest: Respiratory effort was normal with no retractions or use of accessory muscles. Cardiovascular: Extremities warm, well perfused. Abdomen:  Soft and non distended. No tenderness, guarding, rebound, or rigidity. Extremities: Moves all 4 extremeties, No pedal edema.     Spine:   Spine Tenderness ROM   Cervical 0/10 Normal   Thoracic 0/10 Normal   Lumbar 0/10 Normal     Musculoskeletal:    Joint Tenderness Swelling ROM   Right shoulder Absent absent normal   Left shoulder absent absent normal   Right elbow absent absent normal   Left elbow absent absent normal   Right wrist absent absent normal   Left wrist absent absent normal   Right hand grasp Absent absent normal   Left hand grasp absent absent normal   Right hip absent absent normal   Left hip absent absent normal   Right knee Absent absent normal   Left knee absent absent normal   Right ankle absent absent normal   Left ankle absent absent normal   Right foot Absent absent normal   Left foot absent absent normal       CONSULTS: Neurosurgery    PROCEDURES: none    INJURIES:      Principal Problem:    Subdural hematoma (HCC)  Resolved Problems:    * No resolved hospital problems. *        Assessment/Plan:       · Neuro:  GCS 15, stable SDH, Keppra x7 days, No antiplatelets or OAC  · CV: HR near normal limits, no acute issues  · Pulm: tolerating room air   · GI: tolerating general diet  · Renal: no acute issues  · ID: afebrile, no acute issues    · Endocrine: no acute issues  · MSK: no acute issues   · Heme: no acute issues     Bowel regime: glycolax, senokot, dulcolax  Pain control/Sedation: tylenol  DVT prophylaxis: SCd    GI: diet  Glucose protocol: none  Mouth/Eye care: per patient. Aguilera: none    Code status:    DNR-CCA    Patient/Family update:  As available    Disposition:  Continue current care      Electronically signed by Osiel Alfaro MD on 3/12/22 at 7:24 AM EST    Attending Attestation   Patient seen and examined, agree with resident note except for changes made by me, for remaining HP/Consult details please see resident HP/Consult note.         Patient seen and examined I agree with above     CC: Fall [address] fall no LOC, hit back of head, had some bleeding, has a mild headache and some shoulder pain     GEN NAD   HEENT: PERRL 3mm  Scalp lac CDI staples in place   Resp non labored clear b/l   CVS RR  No extra heart sounds   ABD SNT no masses   EXT NVI rom wnl all ext     A/P s/p fall with scalp lac and SDH     - SDH keppra, cts stable, NS ok for d/c   - home meds, no AP OAC,   - PTOT d.c home          Jairo Butler MD

## 2022-03-12 NOTE — CONSULTS
[address]                  [address] [place],  [facility]                                  CONSULTATION    PATIENT NAME: Miguel Puckett                        :        1930  MED REC NO:   [id number]                            ROOM:       10  ACCOUNT NO:   [de-identified]                           ADMIT DATE: 2022  PROVIDER:     Sherrie Marquez MD    CONSULT DATE:  2022    REASON FOR CONSULT:  Left-sided subacute subdural hematoma. HISTORY OF PRESENT ILLNESS:  The patient is a 80-year-old gentleman who  apparently fell. He did not lose consciousness. He stated that he just  lost his balance and footing and continued to fall backwards. He did  hit his head and did suffer his scalp laceration. He was initially  taken to a hospital where he had a CT scan of his head that showed  left-sided subacute subdural hematoma. He was ultimately transported to  [address] [place] [address] for further evaluation and  management. At this time, he complains of a headache. Denies any new  numbness, tingling, weakness, or loss of control of bowel or bladder  function. PAST MEDICAL HISTORY:  Positive for heart disease. PAST SURGICAL HISTORY:  Positive for back surgery and heart surgery. SOCIAL HISTORY:  Positive for social consumption of alcoholic beverages. FAMILY HISTORY:  Negative. HOME MEDICATIONS:  Include aspirin. REVIEW OF SYSTEMS:  HEENT:  Positive for headache, but negative for  double vision or blurry vision. CARDIOVASCULAR:  Negative for chest  pain, arrhythmia, or palpitations. RESPIRATORY:  Negative for shortness  of breath, asthma, bronchitis, or pneumonia. GASTROINTESTINAL:   Negative for heartburn, nausea, vomiting, diarrhea, or constipation. GENITOURINARY:  Negative for hematuria or dysuria. HEMATOLOGIC:   Positive for easy bruising. INFECTIOUS:  Negative for any recent  infection.   MUSCULOSKELETAL: Positive for joint stiffness and swelling. PSYCHIATRIC:  Negative for anxiety or depression. NEUROLOGIC:  Negative  for seizure or stroke. ENDOCRINE:  Negative for thyroid disorder or  diabetes. PHYSICAL EXAMINATION:  VITAL SIGNS:  He is currently afebrile with a T-current of 36.2 degrees  Celsius, pulse 78, blood pressure is 123/75, respiratory rate is 18. GENERAL:  He is resting in bed. Does not appear to be in any acute  distress, appears his stated age. HEENT:  His head is normocephalic. There is evidence of trauma as  manifested by scalp laceration. He has no drainage out of his eyes,  ears, nose, or throat. SKIN:  Warm and dry. MUSCULOSKELETAL:  He has got good range of motion in his bilateral upper  and lower extremities. ABDOMEN:  Soft, nontender, nondistended. RESPIRATORY:  He is not using any accessory muscles of respiration. NEUROLOGIC:  On rest of his neurologic exam, he is awake, alert, and  oriented x3. Cranial nerves II through XII are intact bilaterally. Motor exam reveals 4+/5 strength in his bilateral upper and lower  extremities. Sensation is grossly intact to light touch. Reflexes are  1+ and symmetric. Toes are going down. LAB VALUES:  He has a sodium of 139, potassium 4.4, BUN 24, creatinine  1.5. REVIEW OF IMAGING:  He had a CT scan of his head that shows that he has  a left posterior parietal subacute subdural hematoma. ASSESSMENT AND PLAN:  The patient is a 80-year-old gentleman with a left  subacute subdural hematoma. He is neurologically stable. Plan is to  bring him to the hospital.  We will monitor his neurologic exam with  serial exams and serial imaging. We will place him on Keppra 500 mg  b.i.d.         Priscilla Garza MD    D: 03/11/2022 21:25:53       T: 03/11/2022 21:28:21     [email or web address]  Job#: [id number]     Doc#: [id number]    CC:

## 2022-03-12 NOTE — DISCHARGE INSTRUCTIONS
TRAUMA SERVICES DISCHARGE INSTRUCTIONS    Call [phone], option 2, for any questions/concerns and for follow-up appointment in 2 week(s). Please follow the instructions checked below:    During the course of your workup, we identified an incidental finding of:  none  Please follow-up with your primary care provider. ACTIVITY INSTRUCTIONS  Increase activity as tolerated  No heavy lifting or strenuous activity  Take your incentive spirometer home and use 4-6 times/day   []  No driving until cleared by neurosurgery in 4 weeks    WOUND/DRESSING INSTRUCTIONS:  You may shower. No sitting in bath tub, hot tub or swimming until cleared by physician. Ice to areas of pain for first 24 hours. Heat to areas of pain after that. Wash areas of lacerations/abrasions with soap & water. Rinse well. Pat dry with clean towel. Apply thin layer of Bacitracin, Neosporin, or triple antibiotic cream to affected area 2-3 times per day. Keep wounds clean and dry. []  Sutures/Staples are to be removed in 2 week(s). MEDICATION INSTRUCTIONS  Take medication as prescribed. When taking pain medications, you may experience dizziness or drowsiness. Do not drink alcohol or drive when taking these medications. You may experience constipation while taking pain medication. You may take over the counter stool softeners such as docusate (Colace), sennosides S (Senokot-S), or Miralax.   []  You may take Ibuprofen (over the counter) as directed for mild pain. --You may take up to 800mg every 8 hours for pain, please take with food or milk.   []  You may take acetaminophen (Tylenol) products. Do NOT take more than 4000mg of Tylenol in 24h. []  Do not take any other acetaminophen (Tylenol) products if you are taking Percocet or Norco, as these contain Tylenol. --Do NOT take more than 4000mg of Tylenol in 24h. OPIOID MEDICATION INSTRUCTIONS  Read the medication guide that is included with your prescription.   Take your medication exactly as prescribed. Store medication away from children and in a safe place. Do NOT share your medication with others. Do NOT take medication unless it is prescribed for you. Do NOT drink alcohol while taking opioids (I.e., Norco, Percocet, Oxycodone, etc). Discuss with the Trauma Clinic staff if the dose of medication you are taking does not control your pain and any side effects that you may be having. CALL 911 OR YOUR LOCAL EMERGENCY SERVICE:  --If you take too much medication  --If you have trouble breathing or shortness of breath  --A child has taken this medication. WORK:  You may not return to work until you receive follow-up with the Trauma Clinic or clearance by all consultants. Call the trauma clinic for any of the following or for questions/concerns;  --fever over 101F  --redness, swelling, hardness or warmth at the wound site(s).   --Unrelieved nausea/vomiting  --Foul smelling or cloudy drainage at the wound site(s)  --Unrelieved pain or increase in pain  --Increase in shortness of breath    Follow-up:  Trauma Clinic: [phone] option [address]  [place], [id number] [address] [place]

## 2022-03-12 NOTE — PROGRESS NOTES
SPEECH/LANGUAGE PATHOLOGY  SPEECH/LANGUAGE/COGNITIVE EVALUATION   and PLAN OF CARE      PATIENT NAME:  Deena Thornton  (male)     MRN:  [id number]    :  1930  (80 y.o.)  STATUS:  Inpatient: Room 4503/4503-A    TODAY'S DATE:  3/12/2022  REFERRING PROVIDER:  22   Speech language pathology evaluation Start: 22, End: 22, ONE TIME, Standing Count: 1 Occurrences, R    Ute Arias MD  REASON FOR REFERRAL:    EVALUATING THERAPIST: LUIZA Washington    ADMITTING DIAGNOSIS: Subdural hematoma ([facility] [address] [N53.4S5S]  Injury of head, initial encounter [S09.90XA]  Laceration of scalp, initial encounter [S01.01XA]  Hypertension, unspecified type [I10]    VISIT DIAGNOSIS:   Visit Diagnoses       Codes    Injury of head, initial encounter     S09.90XA    Hypertension, unspecified type     I10           SPEECH THERAPY  PLAN OF CARE   The speech therapy  POC is established based on physician order, speech pathology diagnosis and results of clinical assessment     SPEECH PATHOLOGY DIAGNOSIS:    Speech, Language and Cognition [facility]    Speech Pathology intervention is not warranted at this time. Conditions Requiring Skilled Therapeutic Intervention for speech, language and/or cognition    Not applicable     Specific Speech Therapy Interventions to Include:   Not applicable    Specific instructions for next treatment:     not applicable    SHORT/LONG TERM GOALS  Not applicable. Therapy is not recommended    Patient goals: Patient/family involved in developing goals and treatment plan:   Treatment goals discussed with Patient    The Patient understand(s) the diagnosis, prognosis and plan of care   The patient/family Agreed with above,     This plan may be re-evaluated and revised as warranted.         Rehabilitation Potential/Prognosis: good                CLINICAL ASSESSMENT:  MOTOR SPEECH       Oral Peripheral Examination   Adequate lingual/labial strength     Parameters of Speech Production  Respiration:  Adequate for speech production  Articulation:  Within functional limits  Resonance:  Within functional limits  Quality:   Harsh  Pitch: Within functional limits  Intensity: Within functional limits  Fluency:  Intact  Prosody Intact    RECEPTIVE LANGUAGE    Comprehension of Yes/No Questions: Within functional limits    Process  Simple Verbal Commands:   Within functional limits  Process Intermediate Verbal Commands:   Within functional limits  Process Complex Verbal Commands:     Within functional limits    Comprehension of Conversation:      Within functional limits      EXPRESSIVE LANGUAGE     Serials: Functional    Imitation:  Words   Functional   Sentences Functional    Naming:  (Modality used:  Verbal)  Confrontation Naming  Functional  Functional Description  Functional  Response Naming: Functional    Conversation:      Conversation was within functional limits    COGNITION     Attention/Orientation  Attention: Sustained attention   Orientation:  Oriented to Person, Place, Date, Reason for hospitalization    Memory   Immediate Recall: Repeated 3/3    Delayed Recall:   Recalled 2/3    Long Term Recall:   Recalled Address and Family    Organization/Problem Solving/Reasoning   Verbal Sequencing:   Functional        Verbal Problem solving:   Functional          CLINICAL OBSERVATIONS NOTED DURING THE EVALUATION  Within functional limits                  EDUCATION:   The Speech Language Pathologist (SLP) completed education regarding results of evaluation and that intervention is not warranted at this time.   Learner: Patient  Education: Reviewed results and recommendations of this evaluation  Evaluation of Education:  Verbalizes understanding    Evaluation Time includes thorough review of current medical information, gathering information on past medical history/social history and prior level of function, completion of standardized testing/informal observation of tasks, assessment of data and education on plan of care and goals. CPT code:    68262  eval speech sound lang comprehension      The admitting diagnosis and active problem list, as listed below have been reviewed prior to initiation of this evaluation. ACTIVE PROBLEM LIST:   Patient Active Problem List   Diagnosis    Subdural hematoma ([address]    Scalp laceration       Nikki SLOAN CCC/SLP [id number]  Speech-Language Pathologist

## 2022-03-12 NOTE — PROGRESS NOTES
Department of Neurosurgery  Attending Progress Note    CHIEF COMPLAINT: seen for subdural hematoma    SUBJECTIVE:  headache is better this morning    ROS:  HEENT: negative for headache  CVS: negative for chest pain  Resp: negative for SOB    OBJECTIVE  Physical  VITALS:  /64   Pulse 71   Temp 97.7 °F (36.5 °C) (Oral)   Resp 16   Ht 5' 11\" (1.803 m)   Wt 165 lb (74.8 kg)   SpO2 98%   BMI 23.01 kg/m²   NEUROLOGIC:  Mental Status Exam:  Level of Alertness:   awake  Orientation:   person, place, time  Motor Exam:  Motor exam is symmetrical 5 out of 5 all extremities bilaterally  Sensory:  Sensory intact    Data  CBC:   Lab Results   Component Value Date    WBC 10.0 03/12/2022    RBC 3.66 03/12/2022    HGB 11.5 03/12/2022    HCT 34.7 03/12/2022    MCV 94.8 03/12/2022    MCH 31.4 03/12/2022    MCHC 33.1 03/12/2022    RDW 14.0 03/12/2022     03/12/2022    MPV 11.0 03/12/2022     BMP:    Lab Results   Component Value Date     03/12/2022    K 4.1 03/12/2022     03/12/2022    CO2 23 03/12/2022    BUN 23 03/12/2022    LABALBU 3.9 03/11/2022    CREATININE 1.4 03/12/2022    CALCIUM 8.7 03/12/2022    GFRAA 57 03/12/2022    LABGLOM 47 03/12/2022    GLUCOSE 105 03/12/2022     Current Inpatient Medications  Current Facility-Administered Medications: atorvastatin (LIPITOR) tablet 40 mg, 40 mg, Oral, Daily  metoprolol tartrate (LOPRESSOR) tablet 25 mg, 25 mg, Oral, BID  sodium chloride flush 0.9 % injection 5-40 mL, 5-40 mL, IntraVENous, 2 times per day  sodium chloride flush 0.9 % injection 5-40 mL, 5-40 mL, IntraVENous, PRN  0.9 % sodium chloride infusion, 25 mL, IntraVENous, PRN  ondansetron (ZOFRAN-ODT) disintegrating tablet 4 mg, 4 mg, Oral, Q8H PRN **OR** ondansetron (ZOFRAN) injection 4 mg, 4 mg, IntraVENous, Q6H PRN  polyethylene glycol (GLYCOLAX) packet 17 g, 17 g, Oral, Daily  acetaminophen (TYLENOL) tablet 650 mg, 650 mg, Oral, Q6H  sennosides-docusate sodium (SENOKOT-S) 8.6-50 MG tablet 1 tablet, 1 tablet, Oral, BID  bisacodyl (DULCOLAX) EC tablet 5 mg, 5 mg, Oral, Daily PRN  levETIRAcetam (KEPPRA) tablet 500 mg, 500 mg, Oral, BID  hydrALAZINE (APRESOLINE) injection 10 mg, 10 mg, IntraVENous, Q4H PRN  labetalol (NORMODYNE;TRANDATE) injection 10 mg, 10 mg, IntraVENous, Q4H PRN    ASSESSMENT AND PLAN:    Patient with SDH. Stable. Okay to D/C. F/U in 4 weeks in the office . Continue keppra.   No anticoagulation or antiplatlet medications    Andrew Lainez MD

## 2022-03-12 NOTE — DISCHARGE SUMMARY
Physician Discharge Summary     Patient ID:  Judee Osler  [id number]  21 y.o.  1/16/1930    Admit date: 3/11/2022    Discharge date and time: No discharge date for patient encounter. Admitting Physician: Fabrizio Lam MD     Admission Diagnoses: Subdural hematoma ([facility] [address] [S06.5X9A]  Injury of head, initial encounter [S09.90XA]  Laceration of scalp, initial encounter [S01.01XA]  Hypertension, unspecified type [I10]    Discharge Diagnoses: Principal Problem:    Subdural hematoma ([facility] [address]  Active Problems:    Scalp laceration  Resolved Problems:    * No resolved hospital problems. *      Admission Condition: fair    Discharged Condition: stable    Indication for Admission: Fall with subdural    Hospital Course/Procedures/Operation/treatments:   3/11 Mechanical fall from [facility]. - LOC, + head injury. ASA (held). Stable SDH. Posterior 4 cm scalp lac s/p repair with staples. Keppra x7 days. GCS 15  3/12: Doing well. GCS 15. PT/OT and discharge planning. 3/13: Patient feels well tolerating a diet ambulating on his own plan for discharge home today            Consults:   None    Significant Diagnostic Studies:   XR PELVIS (1-2 VIEWS)    Result Date: 3/11/2022  EXAMINATION: ONE XRAY VIEW OF THE PELVIS 3/11/2022 7:21 pm COMPARISON: None. HISTORY: ORDERING SYSTEM PROVIDED HISTORY: trauma TECHNOLOGIST PROVIDED HISTORY: Reason for exam:->trauma What reading provider will be dictating this exam?->CRC FINDINGS: No evidence of pelvic fracture. Bilateral hips demonstrate normal alignment. No focal osseous lesion. SI joints are symmetric. No acute abnormality of the pelvis.      CT HEAD WO CONTRAST    Result Date: 3/12/2022  EXAMINATION: CT OF THE HEAD WITHOUT CONTRAST  3/12/2022 1:43 am TECHNIQUE: CT of the head was performed without the administration of intravenous contrast. Dose modulation, iterative reconstruction, and/or weight based adjustment of the mA/kV was utilized to reduce the radiation dose to as low as reasonably achievable. COMPARISON: 03/11/2022 at 2044 hours HISTORY: ORDERING SYSTEM PROVIDED HISTORY: Trauma TECHNOLOGIST PROVIDED HISTORY: Has a \"code stroke\" or \"stroke alert\" been called? ->No Reason for exam:->Trauma What reading provider will be dictating this exam?->CRC FINDINGS: The left posterior parietal subdural hematoma is less dense than seen previously. This measures approximately 5 mm in thickness, previously 9 mm. No significant mass effect or midline shift currently identified. There is some streak artifact due to right parieto-occipital scalp staples. Allowing for this, no definite new areas of acute hemorrhage. Cortical volume loss and nonspecific white matter changes, likely due to chronic microvascular ischemia. Scattered vascular calcifications. Basal cisterns are patent. Ventricles are not enlarged. Scattered areas of opacification of the paranasal sinuses most evident in the right frontal and ethmoid sinuses similar to previous. Left parietal subdural hematoma is similar to less conspicuous as described. No other significant change. Continued follow-up recommended. RECOMMENDATIONS: Unavailable     CT HEAD WO CONTRAST    Result Date: 3/11/2022  EXAMINATION: CT OF THE HEAD WITHOUT CONTRAST  3/11/2022 8:43 pm TECHNIQUE: CT of the head was performed without the administration of intravenous contrast. Dose modulation, iterative reconstruction, and/or weight based adjustment of the mA/kV was utilized to reduce the radiation dose to as low as reasonably achievable. COMPARISON: 6:34 p.m.. HISTORY: ORDERING SYSTEM PROVIDED HISTORY: Trauma TECHNOLOGIST PROVIDED HISTORY: Has a \"code stroke\" or \"stroke alert\" been called? ->No Reason for exam:->Trauma What reading provider will be dictating this exam?->CRC FINDINGS: BRAIN/VENTRICLES: Left posterior parietal subdural hematoma measuring up to 9 mm. This is unchanged in size compared to prior study and appears more hyperdense compared to prior study. Possible small slight extension along the left posterior falx compared to prior study. There is no evidence of hydrocephalus. Periventricular white matter changes consistent chronic microvascular disease. ORBITS: The visualized portion of the orbits demonstrate no acute abnormality. SINUSES: The visualized paranasal sinuses and mastoid air cells demonstrate no acute abnormality. SOFT TISSUES/SKULL:  No fractures. Left frontal scalp hematoma. Left posterior parietal subdural hematoma measuring up to 9 mm with no midline shift and unchanged in size compared to prior study although this appears slightly more hyperdense compared to 06/30 4 p.m. Jonathan Awkward Jonathan Garvin Possible slight extension across the left posterior falx compared to prior study. CT Head WO Contrast    Result Date: 3/11/2022  EXAMINATION: CT OF THE HEAD WITHOUT CONTRAST  3/11/2022 6:37 pm TECHNIQUE: CT of the head was performed without the administration of intravenous contrast. Dose modulation, iterative reconstruction, and/or weight based adjustment of the mA/kV was utilized to reduce the radiation dose to as low as reasonably achievable. COMPARISON: None. HISTORY: ORDERING SYSTEM PROVIDED HISTORY: fall, hit back of head TECHNOLOGIST PROVIDED HISTORY: Reason for exam:->fall, hit back of head Has a \"code stroke\" or \"stroke alert\" been called? ->No Decision Support Exception - unselect if not a suspected or confirmed emergency medical condition->Emergency Medical Condition (MA) What reading provider will be dictating this exam?->CRC FINDINGS: BRAIN/VENTRICLES: Left posterior parietal subacute/acute subdural hematoma measuring up to 1.5 cm. .  The gray-white differentiation is maintained without evidence of an acute infarct. There is no evidence of hydrocephalus. Periventricular white matter changes consistent chronic microvascular disease. Diffuse volume loss. ORBITS: The visualized portion of the orbits demonstrate no acute abnormality.  SINUSES: The visualized paranasal sinuses and mastoid air cells demonstrate no acute abnormality. SOFT TISSUES/SKULL:  No acute abnormality of the visualized skull or soft tissues. Left posterior parietal subacute/acute subdural hematoma measuring up to 1.5 cm. No midline shift. No prior exams for comparison. Findings discussed on 3/11/22 with Dr. Shazia Young at 6:53 p.m. [facility] CT Cervical Spine WO Contrast    Result Date: 3/11/2022  EXAMINATION: CT OF THE CERVICAL SPINE WITHOUT CONTRAST 3/11/2022 6:37 pm TECHNIQUE: CT of the cervical spine was performed without the administration of intravenous contrast. Multiplanar reformatted images are provided for review. Dose modulation, iterative reconstruction, and/or weight based adjustment of the mA/kV was utilized to reduce the radiation dose to as low as reasonably achievable. COMPARISON: None. HISTORY: ORDERING SYSTEM PROVIDED HISTORY: fall, hit back of head TECHNOLOGIST PROVIDED HISTORY: Reason for exam:->fall, hit back of head Decision Support Exception - unselect if not a suspected or confirmed emergency medical condition->Emergency Medical Condition (MA) What reading provider will be dictating this exam?->CRC FINDINGS: BONES/ALIGNMENT: There is no acute fracture. Anterolisthesis C2 over C3 and C7 over T1. DEGENERATIVE CHANGES: Multilevel degenerative changes. SOFT TISSUES: There is no prevertebral soft tissue swelling. No acute abnormality of the cervical spine. Multilevel degenerative changes with grade 1 anterolisthesis C2 over C3 and C7 over T1. XR CHEST PORTABLE    Result Date: 3/11/2022  EXAMINATION: ONE XRAY VIEW OF THE CHEST 3/11/2022 7:20 pm COMPARISON: None. HISTORY: ORDERING SYSTEM PROVIDED HISTORY: trauma TECHNOLOGIST PROVIDED HISTORY: Reason for exam:->trauma What reading provider will be dictating this exam?->CRC FINDINGS: The lungs are without acute focal process. There is no effusion or pneumothorax. The cardiomediastinal silhouette is without acute process.  The osseous structures are without acute process. Sternotomy wires noted. Spinal fixation hardware thoracolumbar spine. No evidence of acute trauma. Discharge Exam:  General: No apparent distress, comfortable. HEENT: Trachea midline, no masses, Pupils equal round. Chest: Respiratory effort was normal with no retractions or use of accessory muscles. Cardiovascular: Extremities warm, well perfused. Abdomen:  Soft and non distended. No tenderness, guarding, rebound, or rigidity. Extremities: Moves all 4 extremeties, No pedal edema. Disposition: home    In process/preliminary results:  Outstanding Order Results     No orders found from 2/10/2022 to 3/12/2022.           Patient Instructions:   Current Discharge Medication List           Details   levETIRAcetam (KEPPRA) 500 MG tablet Take 1 tablet by mouth 2 times daily for 6 days  Qty: 12 tablet, Refills: 0              Details   HYDROcodone-acetaminophen (NORCO) 5-325 MG per tablet Take 1 tablet by mouth every 12 hours as needed for Pain.      loratadine (CLARITIN) 10 MG tablet Take 10 mg by mouth daily as needed (allergies)      Alum & Mag Hydroxide-Simeth (MYLANTA PO) Take 30 mLs by mouth every 4 hours as needed (epigastric discomfort)      Dextromethorphan-guaiFENesin (TUSSIN DM PO) Take 5-10 mLs by mouth every 4 hours as needed (cough)      magnesium hydroxide (MILK OF MAGNESIA) 400 MG/5ML suspension Take 30 mLs by mouth daily as needed for Constipation      loperamide (IMODIUM) 2 MG capsule Take 2 mg by mouth as needed for Diarrhea      bisacodyl (DULCOLAX) 5 MG EC tablet Take 5 mg by mouth daily as needed for Constipation      acetaminophen (TYLENOL) 325 MG tablet Take 650 mg by mouth every 4 hours as needed for Pain      brimonidine-timolol (COMBIGAN) 0.2-0.5 % ophthalmic solution Place 1 drop into both eyes every 12 hours      Cholecalciferol (VITAMIN D3) 50 MCG (2000 UT) CAPS Take 1 capsule by mouth daily      famotidine (PEPCID) 20 MG tablet Take 20 mg by mouth daily 3 pm      vitamin B-12 (CYANOCOBALAMIN) 1000 MCG tablet Take 1,000 mcg by mouth daily      vitamin B-6 (PYRIDOXINE) 100 MG tablet Take 100 mg by mouth daily      Folic Acid 0.8 MG CAPS Take 1 capsule by mouth daily      metoprolol tartrate (LOPRESSOR) 25 MG tablet Take 25 mg by mouth 2 times daily      atorvastatin (LIPITOR) 40 MG tablet Take 40 mg by mouth daily      Travoprost, BAK Free, (TRAVATAN Z) 0.004 % SOLN ophthalmic solution Place 1 drop into both eyes nightly      dimethicone-zinc oxide (DARIUS PROTECT) cream Apply topically daily as needed for Dry Skin             TRAUMA SERVICES DISCHARGE INSTRUCTIONS    Call [phone], option 2, for any questions/concerns and for follow-up appointment in 2 week(s). Please follow the instructions checked below:    During the course of your workup, we identified an incidental finding of:  none  Please follow-up with your primary care provider. ACTIVITY INSTRUCTIONS  Increase activity as tolerated  No heavy lifting or strenuous activity  Take your incentive spirometer home and use 4-6 times/day   []  No driving until cleared by neurosurgery in 4 weeks    WOUND/DRESSING INSTRUCTIONS:  You may shower. No sitting in bath tub, hot tub or swimming until cleared by physician. Ice to areas of pain for first 24 hours. Heat to areas of pain after that. Wash areas of lacerations/abrasions with soap & water. Rinse well. Pat dry with clean towel. Apply thin layer of Bacitracin, Neosporin, or triple antibiotic cream to affected area 2-3 times per day. Keep wounds clean and dry. []  Sutures/Staples are to be removed in 2 week(s). MEDICATION INSTRUCTIONS  Hold all anticoagulation and antiplatelet until cleared with neurosurgery  Take medication as prescribed. When taking pain medications, you may experience dizziness or drowsiness. Do not drink alcohol or drive when taking these medications.   You may experience constipation while taking pain medication. You may take over the counter stool softeners such as docusate (Colace), sennosides S (Senokot-S), or Miralax.   []  You may take Ibuprofen (over the counter) as directed for mild pain. --You may take up to 800mg every 8 hours for pain, please take with food or milk.   []  You may take acetaminophen (Tylenol) products. Do NOT take more than 4000mg of Tylenol in 24h. []  Do not take any other acetaminophen (Tylenol) products if you are taking Percocet or Norco, as these contain Tylenol. --Do NOT take more than 4000mg of Tylenol in 24h. OPIOID MEDICATION INSTRUCTIONS  Read the medication guide that is included with your prescription. Take your medication exactly as prescribed. Store medication away from children and in a safe place. Do NOT share your medication with others. Do NOT take medication unless it is prescribed for you. Do NOT drink alcohol while taking opioids (I.e., Norco, Percocet, Oxycodone, etc). Discuss with the Trauma Clinic staff if the dose of medication you are taking does not control your pain and any side effects that you may be having. CALL 911 OR YOUR LOCAL EMERGENCY SERVICE:  --If you take too much medication  --If you have trouble breathing or shortness of breath  --A child has taken this medication. WORK:  You may not return to work until you receive follow-up with the Trauma Clinic or clearance by all consultants. Call the trauma clinic for any of the following or for questions/concerns;  --fever over 101F  --redness, swelling, hardness or warmth at the wound site(s). --Unrelieved nausea/vomiting  --Foul smelling or cloudy drainage at the wound site(s)  --Unrelieved pain or increase in pain  --Increase in shortness of breath    Follow-up:  Trauma Clinic: [phone] option [address]  [place], [address] [place]      Follow up:   Atif Peck MD  [address] [place] [address].   [facility] [phone]    In 4 weeks  For wound re-check    [address]  [address] [facility] [id number]  In 2 weeks  For routine followup       Signed:  Brady Beard MD  3/13/2022  9:03 AM

## 2022-03-12 NOTE — PROGRESS NOTES
Occupational Therapy  OCCUPATIONAL THERAPY INITIAL EVALUATION    [address] [address] [address]      Date:3/12/2022                                                Patient Name: Lori Varma  MRN: [id number]  : 1930  Room: [address] [place] [address]    Evaluating OT: Nia Martinez OTR/L #[id number]     Referring Provider: Bridget Chinchilla MD  Specific Provider Orders/Date: OT eval and treat 3/12/22    Diagnosis: Subdural hematoma ([address] [S06.5X9A]  Injury of head, initial encounter [S09.90XA]  Laceration of scalp, initial encounter [S01.01XA]  Hypertension, unspecified type [I10]   Pt admitted to hospital following fall; + SDH     Pertinent Medical History:  has a past medical history of Hyperlipidemia.        Precautions:  Fall Risk    Assessment of current deficits    [x] Functional mobility  [x]ADLs  [x] Strength               []Cognition    [x] Functional transfers   [x] IADLs         [x] Safety Awareness   [x]Endurance    [] Fine Coordination              [x] Balance      [] Vision/perception   []Sensation     []Gross Motor Coordination  [] ROM  [] Delirium                   [] Motor Control     OT PLAN OF CARE   OT POC based on physician orders, patient diagnosis and results of clinical assessment    Frequency/Duration 1-3 days/wk for 2 weeks PRN   Specific OT Treatment Interventions to include:   * Instruction/training on adapted ADL techniques and AE recommendations to increase functional independence within precautions       * Training on energy conservation strategies, correct breathing pattern and techniques to improve independence/tolerance for self-care routine  * Functional transfer/mobility training/DME recommendations for increased independence, safety, and fall prevention  * Patient/Family education to increase follow through with safety techniques and functional independence  * Recommendation of environmental modifications for increased safety with functional transfers/mobility and ADLs  * Therapeutic exercise to improve motor endurance, ROM, and functional strength for ADLs/functional transfers  * Therapeutic activities to facilitate/challenge dynamic balance, stand tolerance for increased safety and independence with ADLs  * Therapeutic activities to facilitate gross/fine motor skills for increased independence with ADLs  * Neuro-muscular re-education: facilitation of righting/equilibrium reactions, midline orientation, scapular stability/mobility, normalization of muscle tone, and facilitation of volitional active controled movement    Recommended Adaptive Equipment:  TBD     Home Living: Pt resides at [facility]    Bathroom setup: walk-in shower; shower chair; + grab bars    Equipment owned: rollator    Prior Level of Function: Independent with ADLs , Independent with IADLs; ambulated with rollator   Driving: no   Occupation: na    Pain Level: Pt denies pain this session    Cognition: A&O: 4/4; Follows 1-2 step directions   Memory:  good   Sequencing:  good   Problem solving:  fair   Judgement/safety:  fair     Functional Assessment:  AM-PAC Daily Activity Raw Score: 16/24   Initial Eval Status  Date: 3/12/22 Treatment Status  Date: STGs = LTGs  Time frame: 10-14 days   Feeding Independent      Grooming Minimal Assist     Standing   Modified [place]    UB Dressing Minimal Assist     Robe; assist threading around back   Modified Assumption    LB Dressing Moderate Assist   Modified Assumption    Bathing Moderate Assist  Modified [place]    Toileting Moderate Assist   Modified [place]    Bed Mobility  Supine to sit: Stand by Assist   Sit to supine: NT   Supine to sit: Modified [place]   Sit to supine: Modified Assumption    Functional Transfers Minimal Assist   Modified [place]    Functional Mobility Minimal Assist    Ambulated in room including to/from bathroom with w/w; + unsteadiness   Modified [place]    Balance Sitting: Static:  SBA    Dynamic:min A  Standing: min A     Activity Tolerance F-  F+   Visual/  Perceptual Glasses: yes  wfl                  Hand Dominance right   Strength ROM Additional Info:    RUE   4-/5 wfl good  and wfl FMC/dexterity noted during ADL tasks     LUE 4-/5 wfl good  and wfl FMC/dexterity noted during ADL tasks     Hearing: wfl  Sensation:wfl  Tone: wfl  Edema:none noted     Comments: Upon arrival patient supine in bed and agreeable to OT Session. Therapist educated pt on role of OT. At end of session, patient seated in chair with call light and phone within reach, all lines and tubes intact. Overall patient demonstrated decreased independence and safety during completion of ADL/functional transfer/mobility tasks. Pt would benefit from continued skilled OT to increase safety and independence with completion of ADL/IADL tasks for functional independence and quality of life. Treatment: OT treatment provided this date includes: Facilitation of bed mobility, unsupported sitting balance at EOB (impacting ADLs; addressing posture, weight shifting, dynamic reaching), functional transfers (various surfaces: bed, toilet, chair), standing tolerance tasks (addressing posture, balance and activity tolerance; impacting ADLs and functional activity) and functional ambulation tasks with w/w (including to/ from bathroom and in preparation for item retrieval tasks; cuing on posture, w/w management and safety) - skilled cuing on hand placement, posture, body mechanics, energy conservation techniques and safety. Therapist facilitated self-care retraining: UB/LB self-care tasks (robe, pants, socks), simulated toileting task and standing grooming tasks while educating pt on modified techniques, posture, safety and energy conservation techniques. Skilled monitoring of HR, O2 sats and pts response to treatment.      Rehab Potential: Good for established goals     Patient / Family Goal: return home      Patient and/or family were instructed on functional diagnosis, prognosis/goals and OT plan of care. Demonstrated fair understanding. Eval Complexity: Low    Time In: 930  Time Out: 955  Total Treatment Time: 10 minutes    Min Units   OT Eval Low 97165  x  1   OT Eval Medium 34207      OT Eval High 61666      OT Re-Eval [id number]       Therapeutic Ex 19640       Therapeutic Activities 90770  2     ADL/Self Care 49056  8  1   Orthotic Management 65179       Manual [id number]     Neuro Re-Ed [id number]       Non-Billable Time          Evaluation Time additionally includes thorough review of current medical information, gathering information on past medical history/social history and prior level of function, interpretation of standardized testing/informal observation of tasks, assessment of data and development of plan of care and goals.           Nia Martinez OTR/L #[id number]

## 2022-03-13 VITALS
RESPIRATION RATE: 16 BRPM | HEIGHT: 71 IN | SYSTOLIC BLOOD PRESSURE: 146 MMHG | BODY MASS INDEX: 23.1 KG/M2 | OXYGEN SATURATION: 98 % | WEIGHT: 165 LBS | HEART RATE: 84 BPM | TEMPERATURE: 97.5 F | DIASTOLIC BLOOD PRESSURE: 85 MMHG

## 2022-03-13 LAB
ANION GAP SERPL CALCULATED.3IONS-SCNC: 13 MMOL/L (ref 7–16)
BASOPHILS ABSOLUTE: 0.09 E9/L (ref 0–0.2)
BASOPHILS RELATIVE PERCENT: 1 % (ref 0–2)
BUN BLDV-MCNC: 24 MG/DL (ref 6–23)
CALCIUM SERPL-MCNC: 9.4 MG/DL (ref 8.6–10.2)
CHLORIDE BLD-SCNC: 104 MMOL/L (ref 98–107)
CO2: 22 MMOL/L (ref 22–29)
CREAT SERPL-MCNC: 1.5 MG/DL (ref 0.7–1.2)
EOSINOPHILS ABSOLUTE: 0.71 E9/L (ref 0.05–0.5)
EOSINOPHILS RELATIVE PERCENT: 7.8 % (ref 0–6)
GFR AFRICAN AMERICAN: 53
GFR NON-AFRICAN AMERICAN: 44 ML/MIN/1.73
GLUCOSE BLD-MCNC: 103 MG/DL (ref 74–99)
HCT VFR BLD CALC: 36.1 % (ref 37–54)
HEMOGLOBIN: 12 G/DL (ref 12.5–16.5)
IMMATURE GRANULOCYTES #: 0.02 E9/L
IMMATURE GRANULOCYTES %: 0.2 % (ref 0–5)
LYMPHOCYTES ABSOLUTE: 2.65 E9/L (ref 1.5–4)
LYMPHOCYTES RELATIVE PERCENT: 29.2 % (ref 20–42)
MCH RBC QN AUTO: 31.3 PG (ref 26–35)
MCHC RBC AUTO-ENTMCNC: 33.2 % (ref 32–34.5)
MCV RBC AUTO: 94.3 FL (ref 80–99.9)
MONOCYTES ABSOLUTE: 1.27 E9/L (ref 0.1–0.95)
MONOCYTES RELATIVE PERCENT: 14 % (ref 2–12)
NEUTROPHILS ABSOLUTE: 4.33 E9/L (ref 1.8–7.3)
NEUTROPHILS RELATIVE PERCENT: 47.8 % (ref 43–80)
PDW BLD-RTO: 14.1 FL (ref 11.5–15)
PLATELET # BLD: 131 E9/L (ref 130–450)
PMV BLD AUTO: 11.3 FL (ref 7–12)
POTASSIUM REFLEX MAGNESIUM: 4 MMOL/L (ref 3.5–5)
RBC # BLD: 3.83 E12/L (ref 3.8–5.8)
SODIUM BLD-SCNC: 139 MMOL/L (ref 132–146)
WBC # BLD: 9.1 E9/L (ref 4.5–11.5)

## 2022-03-13 PROCEDURE — 99239 HOSP IP/OBS DSCHRG MGMT >30: CPT | Performed by: SURGERY

## 2022-03-13 PROCEDURE — 36415 COLL VENOUS BLD VENIPUNCTURE: CPT

## 2022-03-13 PROCEDURE — 6370000000 HC RX 637 (ALT 250 FOR IP): Performed by: STUDENT IN AN ORGANIZED HEALTH CARE EDUCATION/TRAINING PROGRAM

## 2022-03-13 PROCEDURE — 80048 BASIC METABOLIC PNL TOTAL CA: CPT

## 2022-03-13 PROCEDURE — 85025 COMPLETE CBC W/AUTO DIFF WBC: CPT

## 2022-03-13 PROCEDURE — 2580000003 HC RX 258: Performed by: STUDENT IN AN ORGANIZED HEALTH CARE EDUCATION/TRAINING PROGRAM

## 2022-03-13 RX ORDER — LEVETIRACETAM 500 MG/1
500 TABLET ORAL 2 TIMES DAILY
Qty: 12 TABLET | Refills: 0 | Status: ON HOLD | OUTPATIENT
Start: 2022-03-13 | End: 2022-05-05 | Stop reason: HOSPADM

## 2022-03-13 RX ADMIN — LEVETIRACETAM 500 MG: 500 TABLET, FILM COATED ORAL at 08:17

## 2022-03-13 RX ADMIN — METOPROLOL TARTRATE 25 MG: 25 TABLET, FILM COATED ORAL at 08:17

## 2022-03-13 RX ADMIN — ATORVASTATIN CALCIUM 40 MG: 40 TABLET, FILM COATED ORAL at 08:17

## 2022-03-13 RX ADMIN — SODIUM CHLORIDE, PRESERVATIVE FREE 10 ML: 5 INJECTION INTRAVENOUS at 08:17

## 2022-03-13 RX ADMIN — ACETAMINOPHEN 650 MG: 325 TABLET ORAL at 00:55

## 2022-03-13 ASSESSMENT — PAIN DESCRIPTION - ONSET: ONSET: ON-GOING

## 2022-03-13 ASSESSMENT — PAIN DESCRIPTION - LOCATION: LOCATION: NECK

## 2022-03-13 ASSESSMENT — PAIN DESCRIPTION - PROGRESSION: CLINICAL_PROGRESSION: NOT CHANGED

## 2022-03-13 ASSESSMENT — PAIN DESCRIPTION - FREQUENCY: FREQUENCY: CONTINUOUS

## 2022-03-13 ASSESSMENT — PAIN SCALES - GENERAL
PAINLEVEL_OUTOF10: 2
PAINLEVEL_OUTOF10: 1

## 2022-03-13 ASSESSMENT — PAIN DESCRIPTION - DESCRIPTORS: DESCRIPTORS: SORE

## 2022-03-13 ASSESSMENT — PAIN - FUNCTIONAL ASSESSMENT: PAIN_FUNCTIONAL_ASSESSMENT: ACTIVITIES ARE NOT PREVENTED

## 2022-03-13 NOTE — CARE COORDINATION
Received call from Cayla lAcala, for this CM to check what is needed for pt to return to [address]. This CM contacted, Erinn Ryan liaison whom requested [facility]. This CM spoke with pt via room phone to choice for [facility], list offered, pt declined, no preference of [address]; referral made to [facility], will need [facility] order for PT/OT. Per pt, son will transport back; liaison Erinn Ryan updated. Cayla Alcala provided nurse to nurse # [phone]. The Plan for Transition of Care is related to the following treatment goals: medical stability    The Patient was provided with a choice of provider and agrees   with the discharge plan. [x] Yes [] No    Freedom of choice list was provided with basic dialogue that supports the patient's individualized plan of care/goals, treatment preferences and shares the quality data associated with the providers.  [x] Yes [] No

## 2022-03-13 NOTE — PROGRESS NOTES
Surgery Attending Progress Note,    CC: Fall SH     S: feels well, no pain, no issues,      BP (!) 146/85   Pulse 84   Temp 97.5 °F (36.4 °C) (Temporal)   Resp 16   Ht 5' 11\" (1.803 m)   Wt 165 lb (74.8 kg)   SpO2 98%   BMI 23.01 kg/m²     GEN NAD   HEENT: PERRL 3mm  Scalp lac CDI staples in place   Resp non labored clear b/l   CVS RR  No extra heart sounds   ABD SNT no masses   EXT NVI rom wnl all ext      A/P s/p fall with scalp lac and SDH      - SDH keppra, cts stable, NS ok for d/c   - home meds, no AP OAC,   - PTOT d.c home         Genora Boeck, MD

## 2022-03-13 NOTE — PLAN OF CARE
Problem: Pain:  Goal: Control of acute pain  Description: Control of acute pain  Outcome: Met This Shift  Goal: Control of chronic pain  Description: Control of chronic pain  Outcome: Met This Shift     Problem: Falls - Risk of:  Goal: Will remain free from falls  Description: Will remain free from falls  Outcome: Met This Shift  Goal: Absence of physical injury  Description: Absence of physical injury  Outcome: Met This Shift     Problem: Musculor/Skeletal Functional Status  Goal: Absence of falls  Outcome: Met This Shift

## 2022-03-13 NOTE — HOME CARE
[facility] referral received. Spoke with patient's son Anthony Ortega, demographics verified. Plan to see after discharge. Noemy Ball LPN [facility].

## 2022-03-15 ENCOUNTER — TELEPHONE (OUTPATIENT)
Dept: SURGERY | Age: 87
End: 2022-03-15

## 2022-03-15 LAB
EKG ATRIAL RATE: 76 BPM
EKG P AXIS: 70 DEGREES
EKG P-R INTERVAL: 160 MS
EKG Q-T INTERVAL: 382 MS
EKG QRS DURATION: 76 MS
EKG QTC CALCULATION (BAZETT): 429 MS
EKG R AXIS: -5 DEGREES
EKG T AXIS: 50 DEGREES
EKG VENTRICULAR RATE: 76 BPM

## 2022-03-15 PROCEDURE — 93010 ELECTROCARDIOGRAM REPORT: CPT | Performed by: INTERNAL MEDICINE

## 2022-03-15 NOTE — TELEPHONE ENCOUNTER
MA received call from pt son, Syl Hardy informed MA that pt is in assisted living where the nurses can remove pt staples as well as monitor any injuries. Anthony Shannon declined follow up appt with Trauma clinic and states the nurses will call if they feel pt needs appt. MA verbalized understanding.     Electronically signed by Erica Lux MA on 3/15/2022 at 10:47 AM

## 2022-03-18 ENCOUNTER — TELEPHONE (OUTPATIENT)
Dept: NEUROSURGERY | Age: 87
End: 2022-03-18

## 2022-03-18 DIAGNOSIS — S06.5XAA SDH (SUBDURAL HEMATOMA): Primary | ICD-10-CM

## 2022-03-18 NOTE — TELEPHONE ENCOUNTER
Called patient and informed of CT scan and appointment    Electronically signed by Jono Amador MA on 3/18/22 at 3:13 PM EDT

## 2022-04-07 ENCOUNTER — TELEPHONE (OUTPATIENT)
Dept: NEUROSURGERY | Age: 87
End: 2022-04-07

## 2022-04-07 ENCOUNTER — OFFICE VISIT (OUTPATIENT)
Dept: NEUROSURGERY | Age: 87
End: 2022-04-07
Payer: MEDICARE

## 2022-04-07 ENCOUNTER — HOSPITAL ENCOUNTER (OUTPATIENT)
Dept: CT IMAGING | Age: 87
Discharge: HOME | End: 2022-04-09
Payer: MEDICARE

## 2022-04-07 VITALS — WEIGHT: 163 LBS | HEIGHT: 71 IN | BODY MASS INDEX: 22.82 KG/M2

## 2022-04-07 DIAGNOSIS — S06.5XAA SDH (SUBDURAL HEMATOMA): ICD-10-CM

## 2022-04-07 DIAGNOSIS — S06.5XAA SUBDURAL HEMATOMA: Primary | ICD-10-CM

## 2022-04-07 PROCEDURE — 99213 OFFICE O/P EST LOW 20 MIN: CPT | Performed by: PHYSICIAN ASSISTANT

## 2022-04-07 PROCEDURE — G8420 CALC BMI NORM PARAMETERS: HCPCS | Performed by: PHYSICIAN ASSISTANT

## 2022-04-07 PROCEDURE — 4040F PNEUMOC VAC/ADMIN/RCVD: CPT | Performed by: PHYSICIAN ASSISTANT

## 2022-04-07 PROCEDURE — G8427 DOCREV CUR MEDS BY ELIG CLIN: HCPCS | Performed by: PHYSICIAN ASSISTANT

## 2022-04-07 PROCEDURE — 1036F TOBACCO NON-USER: CPT | Performed by: PHYSICIAN ASSISTANT

## 2022-04-07 PROCEDURE — 70450 CT HEAD/BRAIN W/O DYE: CPT

## 2022-04-07 PROCEDURE — 1111F DSCHRG MED/CURRENT MED MERGE: CPT | Performed by: PHYSICIAN ASSISTANT

## 2022-04-07 PROCEDURE — 1123F ACP DISCUSS/DSCN MKR DOCD: CPT | Performed by: PHYSICIAN ASSISTANT

## 2022-04-07 NOTE — TELEPHONE ENCOUNTER
Maria Del Carmen Rodriguez from [facility] called. She would like call back on head ct. The radiologist marked it for a positive finding. She would like a call back.   [phone]

## 2022-04-07 NOTE — PROGRESS NOTES
Patient is here for follow up from a hospital consult for: subacute SDH. No headache, weakness, or any other concerns    Physical exam  Alert and Oriented X3  PERRLA, EOMI  GIL 5/5  Sensation intact to LT and PP  Reflexes are 2+ and symmetric    A/P: patient is here for follow up for: left SDH. Head CT shows increase in size of left SDH  measuring 13mm. He will avoid all AP and AC meds.   F/u in 2 months with head CT

## 2022-05-03 ENCOUNTER — HOSPITAL ENCOUNTER (OUTPATIENT)
Age: 87
Setting detail: OBSERVATION
Discharge: OTHER INSTITUTION | End: 2022-05-06
Attending: EMERGENCY MEDICINE | Admitting: FAMILY MEDICINE
Payer: MEDICARE

## 2022-05-03 ENCOUNTER — APPOINTMENT (OUTPATIENT)
Dept: GENERAL RADIOLOGY | Age: 87
End: 2022-05-03
Payer: MEDICARE

## 2022-05-03 ENCOUNTER — APPOINTMENT (OUTPATIENT)
Dept: CT IMAGING | Age: 87
End: 2022-05-03
Payer: MEDICARE

## 2022-05-03 DIAGNOSIS — E86.0 DEHYDRATION: ICD-10-CM

## 2022-05-03 DIAGNOSIS — K40.90 UNILATERAL INGUINAL HERNIA WITHOUT OBSTRUCTION OR GANGRENE, RECURRENCE NOT SPECIFIED: ICD-10-CM

## 2022-05-03 DIAGNOSIS — R19.7 NAUSEA VOMITING AND DIARRHEA: Primary | ICD-10-CM

## 2022-05-03 DIAGNOSIS — R11.2 NAUSEA VOMITING AND DIARRHEA: Primary | ICD-10-CM

## 2022-05-03 PROBLEM — K52.9 GASTROENTERITIS: Status: ACTIVE | Noted: 2022-05-03

## 2022-05-03 LAB
ADENOVIRUS BY PCR: NOT DETECTED
ALBUMIN SERPL-MCNC: 4.1 G/DL (ref 3.5–5.2)
ALP BLD-CCNC: 122 U/L (ref 40–129)
ALT SERPL-CCNC: 28 U/L (ref 0–40)
ANION GAP SERPL CALCULATED.3IONS-SCNC: 13 MMOL/L (ref 7–16)
AST SERPL-CCNC: 44 U/L (ref 0–39)
BACTERIA: ABNORMAL /HPF
BASOPHILS ABSOLUTE: 0.03 E9/L (ref 0–0.2)
BASOPHILS RELATIVE PERCENT: 0.3 % (ref 0–2)
BILIRUB SERPL-MCNC: 0.5 MG/DL (ref 0–1.2)
BILIRUBIN URINE: NEGATIVE
BLOOD, URINE: NEGATIVE
BORDETELLA PARAPERTUSSIS BY PCR: NOT DETECTED
BORDETELLA PERTUSSIS BY PCR: NOT DETECTED
BUN BLDV-MCNC: 26 MG/DL (ref 6–23)
CALCIUM SERPL-MCNC: 9.3 MG/DL (ref 8.6–10.2)
CHLAMYDOPHILIA PNEUMONIAE BY PCR: NOT DETECTED
CHLORIDE BLD-SCNC: 101 MMOL/L (ref 98–107)
CLARITY: CLEAR
CO2: 22 MMOL/L (ref 22–29)
COLOR: YELLOW
CORONAVIRUS 229E BY PCR: NOT DETECTED
CORONAVIRUS HKU1 BY PCR: NOT DETECTED
CORONAVIRUS NL63 BY PCR: NOT DETECTED
CORONAVIRUS OC43 BY PCR: NOT DETECTED
CREAT SERPL-MCNC: 1.3 MG/DL (ref 0.7–1.2)
EOSINOPHILS ABSOLUTE: 0.08 E9/L (ref 0.05–0.5)
EOSINOPHILS RELATIVE PERCENT: 0.9 % (ref 0–6)
GFR AFRICAN AMERICAN: >60
GFR NON-AFRICAN AMERICAN: 52 ML/MIN/1.73
GLUCOSE BLD-MCNC: 105 MG/DL (ref 74–99)
GLUCOSE URINE: NEGATIVE MG/DL
HCT VFR BLD CALC: 39.8 % (ref 37–54)
HEMOGLOBIN: 12.9 G/DL (ref 12.5–16.5)
HUMAN METAPNEUMOVIRUS BY PCR: NOT DETECTED
HUMAN RHINOVIRUS/ENTEROVIRUS BY PCR: NOT DETECTED
IMMATURE GRANULOCYTES #: 0.05 E9/L
IMMATURE GRANULOCYTES %: 0.5 % (ref 0–5)
INFLUENZA A BY PCR: NOT DETECTED
INFLUENZA A BY PCR: NOT DETECTED
INFLUENZA B BY PCR: NOT DETECTED
INFLUENZA B BY PCR: NOT DETECTED
KETONES, URINE: NEGATIVE MG/DL
LACTIC ACID: 1.4 MMOL/L (ref 0.5–2.2)
LEUKOCYTE ESTERASE, URINE: NEGATIVE
LIPASE: 15 U/L (ref 13–60)
LYMPHOCYTES ABSOLUTE: 0.69 E9/L (ref 1.5–4)
LYMPHOCYTES RELATIVE PERCENT: 7.5 % (ref 20–42)
MCH RBC QN AUTO: 31.2 PG (ref 26–35)
MCHC RBC AUTO-ENTMCNC: 32.4 % (ref 32–34.5)
MCV RBC AUTO: 96.1 FL (ref 80–99.9)
MONOCYTES ABSOLUTE: 1.02 E9/L (ref 0.1–0.95)
MONOCYTES RELATIVE PERCENT: 11.1 % (ref 2–12)
MYCOPLASMA PNEUMONIAE BY PCR: NOT DETECTED
NEUTROPHILS ABSOLUTE: 7.33 E9/L (ref 1.8–7.3)
NEUTROPHILS RELATIVE PERCENT: 79.7 % (ref 43–80)
NITRITE, URINE: NEGATIVE
PARAINFLUENZA VIRUS 1 BY PCR: NOT DETECTED
PARAINFLUENZA VIRUS 2 BY PCR: NOT DETECTED
PARAINFLUENZA VIRUS 3 BY PCR: NOT DETECTED
PARAINFLUENZA VIRUS 4 BY PCR: NOT DETECTED
PDW BLD-RTO: 14.3 FL (ref 11.5–15)
PH UA: 6 (ref 5–9)
PLATELET # BLD: 128 E9/L (ref 130–450)
PMV BLD AUTO: 11.5 FL (ref 7–12)
POTASSIUM REFLEX MAGNESIUM: 4.4 MMOL/L (ref 3.5–5)
PROTEIN UA: 30 MG/DL
RBC # BLD: 4.14 E12/L (ref 3.8–5.8)
RBC UA: ABNORMAL /HPF (ref 0–2)
RESPIRATORY SYNCYTIAL VIRUS BY PCR: NOT DETECTED
SARS-COV-2, NAAT: NOT DETECTED
SARS-COV-2, PCR: NOT DETECTED
SODIUM BLD-SCNC: 136 MMOL/L (ref 132–146)
SPECIFIC GRAVITY UA: 1.02 (ref 1–1.03)
TOTAL PROTEIN: 7.4 G/DL (ref 6.4–8.3)
UROBILINOGEN, URINE: 0.2 E.U./DL
WBC # BLD: 9.2 E9/L (ref 4.5–11.5)
WBC UA: ABNORMAL /HPF (ref 0–5)

## 2022-05-03 PROCEDURE — 6370000000 HC RX 637 (ALT 250 FOR IP): Performed by: NURSE PRACTITIONER

## 2022-05-03 PROCEDURE — 83690 ASSAY OF LIPASE: CPT

## 2022-05-03 PROCEDURE — 89055 LEUKOCYTE ASSESSMENT FECAL: CPT

## 2022-05-03 PROCEDURE — 99285 EMERGENCY DEPT VISIT HI MDM: CPT

## 2022-05-03 PROCEDURE — 71045 X-RAY EXAM CHEST 1 VIEW: CPT

## 2022-05-03 PROCEDURE — 87324 CLOSTRIDIUM AG IA: CPT

## 2022-05-03 PROCEDURE — 6370000000 HC RX 637 (ALT 250 FOR IP): Performed by: FAMILY MEDICINE

## 2022-05-03 PROCEDURE — 87045 FECES CULTURE AEROBIC BACT: CPT

## 2022-05-03 PROCEDURE — 80053 COMPREHEN METABOLIC PANEL: CPT

## 2022-05-03 PROCEDURE — G0378 HOSPITAL OBSERVATION PER HR: HCPCS

## 2022-05-03 PROCEDURE — 87077 CULTURE AEROBIC IDENTIFY: CPT

## 2022-05-03 PROCEDURE — 83605 ASSAY OF LACTIC ACID: CPT

## 2022-05-03 PROCEDURE — 2580000003 HC RX 258: Performed by: NURSE PRACTITIONER

## 2022-05-03 PROCEDURE — 0202U NFCT DS 22 TRGT SARS-COV-2: CPT

## 2022-05-03 PROCEDURE — 81001 URINALYSIS AUTO W/SCOPE: CPT

## 2022-05-03 PROCEDURE — 87449 NOS EACH ORGANISM AG IA: CPT

## 2022-05-03 PROCEDURE — 74177 CT ABD & PELVIS W/CONTRAST: CPT

## 2022-05-03 PROCEDURE — 87040 BLOOD CULTURE FOR BACTERIA: CPT

## 2022-05-03 PROCEDURE — 87088 URINE BACTERIA CULTURE: CPT

## 2022-05-03 PROCEDURE — 85025 COMPLETE CBC W/AUTO DIFF WBC: CPT

## 2022-05-03 PROCEDURE — 6370000000 HC RX 637 (ALT 250 FOR IP): Performed by: STUDENT IN AN ORGANIZED HEALTH CARE EDUCATION/TRAINING PROGRAM

## 2022-05-03 PROCEDURE — 87502 INFLUENZA DNA AMP PROBE: CPT

## 2022-05-03 PROCEDURE — 96360 HYDRATION IV INFUSION INIT: CPT

## 2022-05-03 PROCEDURE — 84145 PROCALCITONIN (PCT): CPT

## 2022-05-03 PROCEDURE — 87635 SARS-COV-2 COVID-19 AMP PRB: CPT

## 2022-05-03 PROCEDURE — 6360000004 HC RX CONTRAST MEDICATION: Performed by: RADIOLOGY

## 2022-05-03 PROCEDURE — 93005 ELECTROCARDIOGRAM TRACING: CPT | Performed by: EMERGENCY MEDICINE

## 2022-05-03 PROCEDURE — 2580000003 HC RX 258: Performed by: STUDENT IN AN ORGANIZED HEALTH CARE EDUCATION/TRAINING PROGRAM

## 2022-05-03 PROCEDURE — 99223 1ST HOSP IP/OBS HIGH 75: CPT | Performed by: FAMILY MEDICINE

## 2022-05-03 PROCEDURE — 87186 SC STD MICRODIL/AGAR DIL: CPT

## 2022-05-03 PROCEDURE — 1200000000 HC SEMI PRIVATE

## 2022-05-03 RX ORDER — FAMOTIDINE 20 MG/1
20 TABLET, FILM COATED ORAL DAILY
Status: DISCONTINUED | OUTPATIENT
Start: 2022-05-03 | End: 2022-05-06 | Stop reason: HOSPADM

## 2022-05-03 RX ORDER — METRONIDAZOLE 500 MG/100ML
500 INJECTION, SOLUTION INTRAVENOUS ONCE
Status: DISCONTINUED | OUTPATIENT
Start: 2022-05-03 | End: 2022-05-03

## 2022-05-03 RX ORDER — BRIMONIDINE TARTRATE, TIMOLOL MALEATE 2; 5 MG/ML; MG/ML
1 SOLUTION/ DROPS OPHTHALMIC EVERY 12 HOURS
Status: DISCONTINUED | OUTPATIENT
Start: 2022-05-03 | End: 2022-05-03 | Stop reason: CLARIF

## 2022-05-03 RX ORDER — BRIMONIDINE TARTRATE 2 MG/ML
1 SOLUTION/ DROPS OPHTHALMIC 2 TIMES DAILY
Status: DISCONTINUED | OUTPATIENT
Start: 2022-05-03 | End: 2022-05-06 | Stop reason: HOSPADM

## 2022-05-03 RX ORDER — ONDANSETRON 2 MG/ML
4 INJECTION INTRAMUSCULAR; INTRAVENOUS EVERY 6 HOURS PRN
Status: DISCONTINUED | OUTPATIENT
Start: 2022-05-03 | End: 2022-05-06 | Stop reason: HOSPADM

## 2022-05-03 RX ORDER — ACETAMINOPHEN 325 MG/1
650 TABLET ORAL EVERY 6 HOURS PRN
Status: DISCONTINUED | OUTPATIENT
Start: 2022-05-03 | End: 2022-05-06 | Stop reason: HOSPADM

## 2022-05-03 RX ORDER — SODIUM CHLORIDE 9 MG/ML
INJECTION, SOLUTION INTRAVENOUS CONTINUOUS
Status: DISCONTINUED | OUTPATIENT
Start: 2022-05-03 | End: 2022-05-04

## 2022-05-03 RX ORDER — ONDANSETRON 4 MG/1
4 TABLET, ORALLY DISINTEGRATING ORAL EVERY 8 HOURS PRN
Status: DISCONTINUED | OUTPATIENT
Start: 2022-05-03 | End: 2022-05-06 | Stop reason: HOSPADM

## 2022-05-03 RX ORDER — SODIUM CHLORIDE 0.9 % (FLUSH) 0.9 %
5-40 SYRINGE (ML) INJECTION EVERY 12 HOURS SCHEDULED
Status: DISCONTINUED | OUTPATIENT
Start: 2022-05-03 | End: 2022-05-06 | Stop reason: HOSPADM

## 2022-05-03 RX ORDER — HYDROCODONE BITARTRATE AND ACETAMINOPHEN 5; 325 MG/1; MG/1
1 TABLET ORAL EVERY 12 HOURS PRN
Status: DISCONTINUED | OUTPATIENT
Start: 2022-05-03 | End: 2022-05-06 | Stop reason: HOSPADM

## 2022-05-03 RX ORDER — 0.9 % SODIUM CHLORIDE 0.9 %
1000 INTRAVENOUS SOLUTION INTRAVENOUS ONCE
Status: COMPLETED | OUTPATIENT
Start: 2022-05-03 | End: 2022-05-03

## 2022-05-03 RX ORDER — LACTOBACILLUS RHAMNOSUS GG 10B CELL
1 CAPSULE ORAL DAILY
Status: DISCONTINUED | OUTPATIENT
Start: 2022-05-03 | End: 2022-05-06 | Stop reason: HOSPADM

## 2022-05-03 RX ORDER — TIMOLOL MALEATE 5 MG/ML
1 SOLUTION/ DROPS OPHTHALMIC 2 TIMES DAILY
Status: DISCONTINUED | OUTPATIENT
Start: 2022-05-03 | End: 2022-05-06 | Stop reason: HOSPADM

## 2022-05-03 RX ORDER — SODIUM CHLORIDE 9 MG/ML
INJECTION, SOLUTION INTRAVENOUS PRN
Status: DISCONTINUED | OUTPATIENT
Start: 2022-05-03 | End: 2022-05-06 | Stop reason: HOSPADM

## 2022-05-03 RX ORDER — ATORVASTATIN CALCIUM 40 MG/1
40 TABLET, FILM COATED ORAL DAILY
Status: DISCONTINUED | OUTPATIENT
Start: 2022-05-03 | End: 2022-05-06 | Stop reason: HOSPADM

## 2022-05-03 RX ORDER — ACETAMINOPHEN 650 MG/1
650 SUPPOSITORY RECTAL EVERY 6 HOURS PRN
Status: DISCONTINUED | OUTPATIENT
Start: 2022-05-03 | End: 2022-05-06 | Stop reason: HOSPADM

## 2022-05-03 RX ORDER — SODIUM CHLORIDE 0.9 % (FLUSH) 0.9 %
5-40 SYRINGE (ML) INJECTION PRN
Status: DISCONTINUED | OUTPATIENT
Start: 2022-05-03 | End: 2022-05-06 | Stop reason: HOSPADM

## 2022-05-03 RX ORDER — NAPROXEN 500 MG/1
500 TABLET ORAL 2 TIMES DAILY PRN
Status: ON HOLD | COMMUNITY
End: 2022-05-05 | Stop reason: HOSPADM

## 2022-05-03 RX ORDER — TRAVOPROST OPHTHALMIC SOLUTION 0.04 MG/ML
1 SOLUTION OPHTHALMIC NIGHTLY
Status: DISCONTINUED | OUTPATIENT
Start: 2022-05-03 | End: 2022-05-03 | Stop reason: CLARIF

## 2022-05-03 RX ORDER — LATANOPROST 50 UG/ML
1 SOLUTION/ DROPS OPHTHALMIC NIGHTLY
Status: DISCONTINUED | OUTPATIENT
Start: 2022-05-03 | End: 2022-05-06 | Stop reason: HOSPADM

## 2022-05-03 RX ORDER — POLYETHYLENE GLYCOL 3350 17 G/17G
17 POWDER, FOR SOLUTION ORAL DAILY PRN
Status: DISCONTINUED | OUTPATIENT
Start: 2022-05-03 | End: 2022-05-06 | Stop reason: HOSPADM

## 2022-05-03 RX ORDER — ACETAMINOPHEN 500 MG
1000 TABLET ORAL ONCE
Status: COMPLETED | OUTPATIENT
Start: 2022-05-03 | End: 2022-05-03

## 2022-05-03 RX ADMIN — Medication 1 CAPSULE: at 22:45

## 2022-05-03 RX ADMIN — ATORVASTATIN CALCIUM 40 MG: 40 TABLET, FILM COATED ORAL at 22:45

## 2022-05-03 RX ADMIN — IOPAMIDOL 75 ML: 755 INJECTION, SOLUTION INTRAVENOUS at 15:30

## 2022-05-03 RX ADMIN — SODIUM CHLORIDE 1000 ML: 9 INJECTION, SOLUTION INTRAVENOUS at 14:56

## 2022-05-03 RX ADMIN — ACETAMINOPHEN 1000 MG: 500 TABLET ORAL at 14:56

## 2022-05-03 RX ADMIN — METOPROLOL TARTRATE 25 MG: 25 TABLET, FILM COATED ORAL at 22:45

## 2022-05-03 RX ADMIN — FAMOTIDINE 20 MG: 20 TABLET ORAL at 22:45

## 2022-05-03 RX ADMIN — SODIUM CHLORIDE, PRESERVATIVE FREE 10 ML: 5 INJECTION INTRAVENOUS at 22:45

## 2022-05-03 ASSESSMENT — PAIN - FUNCTIONAL ASSESSMENT
PAIN_FUNCTIONAL_ASSESSMENT: NONE - DENIES PAIN
PAIN_FUNCTIONAL_ASSESSMENT: NONE - DENIES PAIN

## 2022-05-03 ASSESSMENT — ENCOUNTER SYMPTOMS
SORE THROAT: 0
COUGH: 0
DIARRHEA: 1
WHEEZING: 0
EYE REDNESS: 0
VOMITING: 0
SHORTNESS OF BREATH: 0
NAUSEA: 0
EYE PAIN: 0
EYE DISCHARGE: 0
ABDOMINAL PAIN: 0
SINUS PRESSURE: 0
BACK PAIN: 0

## 2022-05-03 ASSESSMENT — PAIN SCALES - GENERAL: PAINLEVEL_OUTOF10: 0

## 2022-05-03 NOTE — ED PROVIDER NOTES
[facility]  Department of Emergency Medicine     Written by: Idaina Light DO  Patient Name: Dennis Yu  Admit Date: 5/3/2022  1:05 PM  MRN: [id number]                   : 1930        Chief Complaint   Patient presents with    Diarrhea    Fever    - Chief complaint    Patient is a 59-year-old male past medical history of hyperlipidemia and CAD. Patient has a chief complaint of fevers as well as diarrhea. Patient states that he has had multiple episodes of watery diarrhea for the last week and a half. Patient states that he has had up to 6 episodes a day. Patient also states that he has had intermittent fevers with T-max of 102. Patient denies any exacerbating relieving factors. He states that symptoms have been moderate in severity and constant since onset. Patient denies any similar episodes in the past.  Patient denies any recent antibiotic use. Patient denies any sick contacts. Patient is currently up-to-date on his COVID-19 vaccines. Patient denies any nausea, vomiting, chest pain, cough, abdominal pain, headache, lightheadedness, numbness or tingling. The history is provided by the patient. No  was used. Review of Systems   Constitutional: Positive for fever. Negative for chills. HENT: Negative for ear pain, sinus pressure and sore throat. Eyes: Negative for pain, discharge and redness. Respiratory: Negative for cough, shortness of breath and wheezing. Cardiovascular: Negative for chest pain. Gastrointestinal: Positive for diarrhea. Negative for abdominal pain, nausea and vomiting. Genitourinary: Negative for dysuria and frequency. Musculoskeletal: Negative for arthralgias and back pain. Skin: Negative for rash and wound. Neurological: Negative for weakness and headaches. Hematological: Negative for adenopathy. All other systems reviewed and are negative. Physical Exam  Vitals and nursing note reviewed. Rapid    Specimen: Nasopharyngeal Swab   Result Value Ref Range    SARS-CoV-2, NAAT Not Detected Not Detected   CBC with Auto Differential   Result Value Ref Range    WBC 9.2 4.5 - 11.5 E9/L    RBC 4.14 3.80 - 5.80 E12/L    Hemoglobin 12.9 12.5 - 16.5 g/dL    Hematocrit 39.8 37.0 - 54.0 %    MCV 96.1 80.0 - 99.9 fL    MCH 31.2 26.0 - 35.0 pg    MCHC 32.4 32.0 - 34.5 %    RDW 14.3 11.5 - 15.0 fL    Platelets 270 (L) 383 - 450 E9/L    MPV 11.5 7.0 - 12.0 fL    Neutrophils % 79.7 43.0 - 80.0 %    Immature Granulocytes % 0.5 0.0 - 5.0 %    Lymphocytes % 7.5 (L) 20.0 - 42.0 %    Monocytes % 11.1 2.0 - 12.0 %    Eosinophils % 0.9 0.0 - 6.0 %    Basophils % 0.3 0.0 - 2.0 %    Neutrophils Absolute 7.33 (H) 1.80 - 7.30 E9/L    Immature Granulocytes # 0.05 E9/L    Lymphocytes Absolute 0.69 (L) 1.50 - 4.00 E9/L    Monocytes Absolute 1.02 (H) 0.10 - 0.95 E9/L    Eosinophils Absolute 0.08 0.05 - 0.50 E9/L    Basophils Absolute 0.03 0.00 - 0.20 E9/L   Comprehensive Metabolic Panel w/ Reflex to MG   Result Value Ref Range    Sodium 136 132 - 146 mmol/L    Potassium reflex Magnesium 4.4 3.5 - 5.0 mmol/L    Chloride 101 98 - 107 mmol/L    CO2 22 22 - 29 mmol/L    Anion Gap 13 7 - 16 mmol/L    Glucose 105 (H) 74 - 99 mg/dL    BUN 26 (H) 6 - 23 mg/dL    CREATININE 1.3 (H) 0.7 - 1.2 mg/dL    GFR Non-African American 52 >=60 mL/min/1.73    GFR African American >60     Calcium 9.3 8.6 - 10.2 mg/dL    Total Protein 7.4 6.4 - 8.3 g/dL    Albumin 4.1 3.5 - 5.2 g/dL    Total Bilirubin 0.5 0.0 - 1.2 mg/dL    Alkaline Phosphatase 122 40 - 129 U/L    ALT 28 0 - 40 U/L    AST 44 (H) 0 - 39 U/L   Lipase   Result Value Ref Range    Lipase 15 13 - 60 U/L   Urinalysis with Microscopic   Result Value Ref Range    Color, UA Yellow Straw/Yellow    Clarity, UA Clear Clear    Glucose, Ur Negative Negative mg/dL    Bilirubin Urine Negative Negative    Ketones, Urine Negative Negative mg/dL    Specific Gravity, UA 1.020 1.005 - 1.030    Blood, Urine Negative Negative    pH, UA 6.0 5.0 - 9.0    Protein, UA 30 (A) Negative mg/dL    Urobilinogen, Urine 0.2 <2.0 E.U./dL    Nitrite, Urine Negative Negative    Leukocyte Esterase, Urine Negative Negative    WBC, UA NONE 0 - 5 /HPF    RBC, UA NONE 0 - 2 /HPF    Bacteria, UA NONE SEEN None Seen /HPF   Lactic Acid   Result Value Ref Range    Lactic Acid 1.4 0.5 - 2.2 mmol/L       RADIOLOGY:  CT ABDOMEN PELVIS W IV CONTRAST Additional Contrast? None   Final Result   Findings of enterocolitis, with predominant enteritis component. Partially obstructing right inguinal hernia to be correlate initially with   clinical examination to the terminate if the area is reducible or not. If   not reducible surgical consultation indicated for the hernia. Distended the bladder at the time the present study. Cannot exclude urinary   bladder retention. Please correlate clinically. RECOMMENDATIONS:   Unavailable         XR CHEST PORTABLE   Final Result   Suspected atelectasis at the left base.             ------------------------- NURSING NOTES AND VITALS REVIEWED ---------------------------  Date / Time Roomed:  5/3/2022  1:05 PM  ED Bed Assignment:  23/23    The nursing notes within the ED encounter and vital signs as below have been reviewed. Patient Vitals for the past 24 hrs:   BP Temp Temp src Pulse Resp SpO2 Height Weight   05/03/22 1315 (!) 142/64 100.2 °F (37.9 °C) Oral 75 14 95 % 5' 11\" (1.803 m) 163 lb (73.9 kg)       Oxygen Saturation Interpretation: Normal    ------------------------------------------ PROGRESS NOTES ------------------------------------------  Re-evaluation(s):  Time: 1625  Patients symptoms show no change  Repeat physical examination is not changed    Counseling:  I have spoken with the patient and son and discussed todays results, in addition to providing specific details for the plan of care and counseling regarding the diagnosis and prognosis.   Their questions are answered at this time and they are agreeable with the plan of admission.    --------------------------------- ADDITIONAL PROVIDER NOTES ---------------------------------  Consultations:  Time: 1644. Spoke with . Discussed case. They will admit the patient. This patient's ED course included: a personal history and physicial examination    This patient has remained hemodynamically stable during their ED course. Diagnosis:  1. Nausea vomiting and diarrhea    2. Dehydration    3. Unilateral inguinal hernia without obstruction or gangrene, recurrence not specified        Disposition:  Patient's disposition: Admit to telemetry  Patient's condition is stable. Patient was seen and evaluated by myself and my attending . Assessment and Plan discussed with attending provider, please see attestation for final plan of care.      DO Danae Lee DO  Resident  05/03/22 5280

## 2022-05-03 NOTE — H&P
[facility] History and Physical      CHIEF COMPLAINT:  Fever/ diarrhea     History of Present Illness:     Patient is a 81 yo male patient who follows with PCP Dr. Hannah Mason with a past medical history of HLD, HTN, CKD, iron deficiency anemia, vitamin D deficiency, S/p aortic valve replacement, recent subdural hematoma in March. He presents to the ER from [address] with fever and diarrhea. Reporting multiple episodes of water diarrhea for the last 2 days. Reporting he has never had diarrhea like this before. Reporting fevers 102. Denies recent antibiotic use. Patient reporting he told nursing at [address] [place] [address] and they mentioned several other [facility]isted Living members having diarrhea as well. Pt reporting not sure if he was exposed to ill contact. Reporting he is up to date on his COVID vaccinations. Symptoms moderate ongoing and progressive. Nothing makes better. Appetite has been decreased. Denies dizziness/ light headedness. Reporting he has a known right inguinal hernia. He reports no pain and has not been bothering him. He has followed up regarding hernia in the past and told no surgery and to monitor- if painful to get re evaluated. He was admitted in March 2022 at [address] [place] [address] following a mechanical fall and head injury. He had a scalp laceration s/p repair with staples. Which has since healed. He was also noted to have a subdural hematoma. He was discharged on short course of keppra and was to follow up with neurosurgery. He had a follow up CT head scan 4/7/22 which showed increase in size. He was told to avoid all antiplatelet and anticoagulation meds with follow up in 2 months. Appears he was recently started on naproxen at [facility]. Patient resting in bed in no acute distress. Reporting feels ok. Reporting he is hungry. Denies chest pain, sob, nausea, vomiting, dysuria, hematuria.            Informant(s) for H&P: Patient/ chart review     REVIEW OF SYSTEMS:  A comprehensive review of systems was negative except for: what is in the HPI      PMH:  Past Medical History:   Diagnosis Date    Hyperlipidemia        Surgical History:  Past Surgical History:   Procedure Laterality Date    BACK SURGERY      CABG WITH AORTIC VALVE REPAIR         Medications Prior to Admission:    Prior to Admission medications    Medication Sig Start Date End Date Taking? Authorizing Provider   levETIRAcetam (KEPPRA) 500 MG tablet Take 1 tablet by mouth 2 times daily for 6 days 3/13/22 3/19/22  Judit Valenzuela MD   HYDROcodone-acetaminophen [facility]) 5-325 MG per tablet Take 1 tablet by mouth every 12 hours as needed for Pain.     Historical Provider, MD   loratadine (CLARITIN) 10 MG tablet Take 10 mg by mouth daily as needed (allergies)    Historical Provider, MD   Alum & Mag Hydroxide-Simeth (MYLANTA PO) Take 30 mLs by mouth every 4 hours as needed (epigastric discomfort)    Historical Provider, MD   Dextromethorphan-guaiFENesin (TUSSIN DM PO) Take 5-10 mLs by mouth every 4 hours as needed (cough)    Historical Provider, MD   magnesium hydroxide (MILK OF MAGNESIA) 400 MG/5ML suspension Take 30 mLs by mouth daily as needed for Constipation    Historical Provider, MD   loperamide (IMODIUM) 2 MG capsule Take 2 mg by mouth as needed for Diarrhea    Historical Provider, MD   bisacodyl (DULCOLAX) 5 MG EC tablet Take 5 mg by mouth daily as needed for Constipation    Historical Provider, MD   acetaminophen (TYLENOL) 325 MG tablet Take 650 mg by mouth every 4 hours as needed for Pain    Historical Provider, MD   brimonidine-timolol (COMBIGAN) 0.2-0.5 % ophthalmic solution Place 1 drop into both eyes every 12 hours    Historical Provider, MD   Cholecalciferol (VITAMIN D3) 50 MCG (2000 UT) CAPS Take 1 capsule by mouth daily    Historical Provider, MD   famotidine (PEPCID) 20 MG tablet Take 20 mg by mouth daily 3 pm    Historical Provider, MD   vitamin B-12 (CYANOCOBALAMIN) 1000 MCG tablet Take 1,000 mcg by mouth daily    Historical Provider, MD   vitamin B-6 (PYRIDOXINE) 100 MG tablet Take 100 mg by mouth daily    Historical Provider, MD   Folic Acid 0.8 MG CAPS Take 1 capsule by mouth daily    Historical Provider, MD   metoprolol tartrate (LOPRESSOR) 25 MG tablet Take 25 mg by mouth 2 times daily    Historical Provider, MD   atorvastatin (LIPITOR) 40 MG tablet Take 40 mg by mouth daily    Historical Provider, MD   Travoprost, BAK Free, (TRAVATAN Z) 0.004 % SOLN ophthalmic solution Place 1 drop into both eyes nightly    Historical Provider, MD   dimethicone-zinc oxide (DARIUS PROTECT) cream Apply topically daily as needed for Dry Skin    Historical Provider, MD       Allergies:    Patient has no known allergies. Social History:    reports that he has never smoked. He has never used smokeless tobacco. He reports current alcohol use. He reports that he does not use drugs.'  Non smoker  Denies illicits  Reporting his wife passed away     Family History:   family history is not on file. None pertinent       PHYSICAL EXAM:  Vitals:  BP (!) 142/64   Pulse 75   Temp 100.2 °F (37.9 °C) (Oral)   Resp 14   Ht 5' 11\" (1.803 m)   Wt 163 lb (73.9 kg)   SpO2 95%   BMI 22.73 kg/m²     General Appearance: alert and oriented to person, place and time   Skin: warm and dry  Head: normocephalic and atraumatic  Neck: neck supple and non tender without mass   Pulmonary/Chest: dimished throughout to auscultation.   Cardiovascular: normal rate, normal S1 and S2 and no carotid bruits  Abdomen: soft, non-tender, non-distended, normal bowel sounds  Extremities: no cyanosis, no clubbing and no edema, right inguinal hernia  Neurologic: speech normal         LABS:  Recent Labs     05/03/22  1406      K 4.4      CO2 22   BUN 26*   CREATININE 1.3*   GLUCOSE 105*   CALCIUM 9.3       Recent Labs     05/03/22  1406   WBC 9.2   RBC 4.14   HGB 12.9   HCT 39.8   MCV 96.1   MCH 31.2   MCHC 32.4   RDW 14.3   *   MPV 11.5       No results for input(s): POCGLU in the last 72 hours. Radiology:   CT ABDOMEN PELVIS W IV CONTRAST Additional Contrast? None   Final Result   Findings of enterocolitis, with predominant enteritis component. Partially obstructing right inguinal hernia to be correlate initially with   clinical examination to the terminate if the area is reducible or not. If   not reducible surgical consultation indicated for the hernia. Distended the bladder at the time the present study. Cannot exclude urinary   bladder retention. Please correlate clinically. RECOMMENDATIONS:   Unavailable         XR CHEST PORTABLE   Final Result   Suspected atelectasis at the left base. ASSESSMENT:      Principal Problem:    Gastroenteritis  Resolved Problems:    * No resolved hospital problems. *      PLAN:    1. Diarrheal illness ? Gastroenteritis- viral vs infectious:  Pt presented to the ER from Assisted Living with diarrhea x 2  Days  with associated fever. ? Ill contact at [address] CT abdomen / pelvis reviewed showing findings of enterocolitis. Started on IV ceftriaxone/ IV flagyl. Rapid flu/ covid negative. Will see if can add on viral panel. Send stool for c-diff Received IVF in ER. Appears hydrated currently. Monitor labs/ vitals. Consult GI. Hold off on antibiotics for now. Lactobacillus. 2. Fevers; monitor. Likely related to above. Reporting fevers 102 at home. T 100.2 on admission. Cultures sent. CXR with left sided atelectasis. Check UA    3. CKD: creatinine appears close to baseline- monitor    4. Possible partially obstructed right inguinal hernia- pt reporting known hernia and not causing any recent pain/ discomfort. 5. Recent mechanical fall with scalp injury/ subdural hematoma: pt is following with neurosurgery outpt with serial CT scans. Last CT scan 4/7- he was to follow up in 2 months and to avoid anticoagulation/ antiplatelet medications. Denies HA/ weakness. 6. HTN:  On metoprolol     7.  HLD-continue lipitor    8. H/o iron deficiency anemia    9. H/o aortic valve replacement     10. Deconditioning: reporting he uses a walker at Assisted Living     Code Status: DNR CCA  DVT prophylaxis: contraindicated due to recent subdural hematoma       NOTE: This report was transcribed using voice recognition software. Every effort was made to ensure accuracy; however, inadvertent computerized transcription errors may be present.   Electronically signed by JAVIER Crawford on 5/3/2022 at 4:36 PM

## 2022-05-03 NOTE — PROGRESS NOTES
Admission database completed to best of this RN's ability. Care plan and education initiated. Pt from the [facility] at [address] [place] [address]. Per facility, pt has been using a walker and WC d/t increased weakness. [place], wears bilateral hearing aids.

## 2022-05-04 LAB
ANION GAP SERPL CALCULATED.3IONS-SCNC: 11 MMOL/L (ref 7–16)
BASOPHILS ABSOLUTE: 0.04 E9/L (ref 0–0.2)
BASOPHILS RELATIVE PERCENT: 0.4 % (ref 0–2)
BUN BLDV-MCNC: 25 MG/DL (ref 6–23)
C DIFF TOXIN/ANTIGEN: NORMAL
CALCIUM SERPL-MCNC: 8.6 MG/DL (ref 8.6–10.2)
CHLORIDE BLD-SCNC: 103 MMOL/L (ref 98–107)
CO2: 21 MMOL/L (ref 22–29)
CREAT SERPL-MCNC: 1.3 MG/DL (ref 0.7–1.2)
EKG ATRIAL RATE: 79 BPM
EKG P AXIS: 19 DEGREES
EKG P-R INTERVAL: 164 MS
EKG Q-T INTERVAL: 394 MS
EKG QRS DURATION: 82 MS
EKG QTC CALCULATION (BAZETT): 451 MS
EKG R AXIS: -8 DEGREES
EKG T AXIS: 33 DEGREES
EKG VENTRICULAR RATE: 79 BPM
EOSINOPHILS ABSOLUTE: 0.02 E9/L (ref 0.05–0.5)
EOSINOPHILS RELATIVE PERCENT: 0.2 % (ref 0–6)
GFR AFRICAN AMERICAN: >60
GFR NON-AFRICAN AMERICAN: 52 ML/MIN/1.73
GLUCOSE BLD-MCNC: 85 MG/DL (ref 74–99)
HCT VFR BLD CALC: 35.6 % (ref 37–54)
HEMOGLOBIN: 11.4 G/DL (ref 12.5–16.5)
IMMATURE GRANULOCYTES #: 0.03 E9/L
IMMATURE GRANULOCYTES %: 0.3 % (ref 0–5)
LYMPHOCYTES ABSOLUTE: 1.49 E9/L (ref 1.5–4)
LYMPHOCYTES RELATIVE PERCENT: 16.4 % (ref 20–42)
MCH RBC QN AUTO: 30.6 PG (ref 26–35)
MCHC RBC AUTO-ENTMCNC: 32 % (ref 32–34.5)
MCV RBC AUTO: 95.7 FL (ref 80–99.9)
MONOCYTES ABSOLUTE: 1.24 E9/L (ref 0.1–0.95)
MONOCYTES RELATIVE PERCENT: 13.6 % (ref 2–12)
NEUTROPHILS ABSOLUTE: 6.29 E9/L (ref 1.8–7.3)
NEUTROPHILS RELATIVE PERCENT: 69.1 % (ref 43–80)
PDW BLD-RTO: 14.6 FL (ref 11.5–15)
PLATELET # BLD: 132 E9/L (ref 130–450)
PMV BLD AUTO: 10.8 FL (ref 7–12)
POTASSIUM REFLEX MAGNESIUM: 4.3 MMOL/L (ref 3.5–5)
PROCALCITONIN: 0.09 NG/ML (ref 0–0.08)
RBC # BLD: 3.72 E12/L (ref 3.8–5.8)
SODIUM BLD-SCNC: 135 MMOL/L (ref 132–146)
WBC # BLD: 9.1 E9/L (ref 4.5–11.5)
WHITE BLOOD CELLS (WBC), STOOL: NORMAL

## 2022-05-04 PROCEDURE — 2580000003 HC RX 258: Performed by: FAMILY MEDICINE

## 2022-05-04 PROCEDURE — 36415 COLL VENOUS BLD VENIPUNCTURE: CPT

## 2022-05-04 PROCEDURE — 82272 OCCULT BLD FECES 1-3 TESTS: CPT

## 2022-05-04 PROCEDURE — 6370000000 HC RX 637 (ALT 250 FOR IP): Performed by: NURSE PRACTITIONER

## 2022-05-04 PROCEDURE — 83993 ASSAY FOR CALPROTECTIN FECAL: CPT

## 2022-05-04 PROCEDURE — 99233 SBSQ HOSP IP/OBS HIGH 50: CPT | Performed by: FAMILY MEDICINE

## 2022-05-04 PROCEDURE — 2580000003 HC RX 258: Performed by: NURSE PRACTITIONER

## 2022-05-04 PROCEDURE — 97161 PT EVAL LOW COMPLEX 20 MIN: CPT

## 2022-05-04 PROCEDURE — APPSS30 APP SPLIT SHARED TIME 16-30 MINUTES: Performed by: NURSE PRACTITIONER

## 2022-05-04 PROCEDURE — G0378 HOSPITAL OBSERVATION PER HR: HCPCS

## 2022-05-04 PROCEDURE — 80048 BASIC METABOLIC PNL TOTAL CA: CPT

## 2022-05-04 PROCEDURE — 87425 ROTAVIRUS AG IA: CPT

## 2022-05-04 PROCEDURE — 85025 COMPLETE CBC W/AUTO DIFF WBC: CPT

## 2022-05-04 PROCEDURE — 6370000000 HC RX 637 (ALT 250 FOR IP): Performed by: FAMILY MEDICINE

## 2022-05-04 PROCEDURE — 1200000000 HC SEMI PRIVATE

## 2022-05-04 PROCEDURE — 97530 THERAPEUTIC ACTIVITIES: CPT

## 2022-05-04 PROCEDURE — 82705 FATS/LIPIDS FECES QUAL: CPT

## 2022-05-04 RX ADMIN — METOPROLOL TARTRATE 25 MG: 25 TABLET, FILM COATED ORAL at 20:23

## 2022-05-04 RX ADMIN — ATORVASTATIN CALCIUM 40 MG: 40 TABLET, FILM COATED ORAL at 09:04

## 2022-05-04 RX ADMIN — Medication 1 CAPSULE: at 09:04

## 2022-05-04 RX ADMIN — SODIUM CHLORIDE, PRESERVATIVE FREE 10 ML: 5 INJECTION INTRAVENOUS at 20:25

## 2022-05-04 RX ADMIN — BRIMONIDINE TARTRATE 1 DROP: 2 SOLUTION OPHTHALMIC at 09:04

## 2022-05-04 RX ADMIN — SODIUM CHLORIDE: 9 INJECTION, SOLUTION INTRAVENOUS at 02:25

## 2022-05-04 RX ADMIN — METOPROLOL TARTRATE 25 MG: 25 TABLET, FILM COATED ORAL at 09:04

## 2022-05-04 RX ADMIN — FAMOTIDINE 20 MG: 20 TABLET ORAL at 09:04

## 2022-05-04 RX ADMIN — TIMOLOL MALEATE 1 DROP: 5 SOLUTION OPHTHALMIC at 09:04

## 2022-05-04 RX ADMIN — LATANOPROST 1 DROP: 50 SOLUTION OPHTHALMIC at 20:23

## 2022-05-04 ASSESSMENT — PAIN SCALES - GENERAL
PAINLEVEL_OUTOF10: 0
PAINLEVEL_OUTOF10: 0

## 2022-05-04 NOTE — CONSULTS
Oskar Verde M.D. [facility]  Dr. Lorenzo Clayton M.D.,  Dr. Renée Og M.D.,  Dr. Amira Mesa D.O.,  Dr. Liliana Sharp D.O. ,  Dr. Belgica Desouza M.D.,  Dr. Migel Young D.O. [facility]  80 y.o.  male      Re: Diarrhea  Requesting physician: CHRIS Luong  Date:2:14 PM 5/4/2022          HPI: 27-year-old male patient presenting to the emergency department yesterday with watery nonbloody diarrhea since Sunday. Patient resides at an assisted living facility and according to the patient and family some other residents are experiencing similar symptoms. Patient also was found to be febrile on presentation with temperature reported as high as 100.2 with patient reporting even higher temperature at the assisted living 102. Patient denies any significant abdominal pain. He denies nausea vomiting. He denies hematemesis emesis of coffee-ground material.  He denies blood in the stool or melena. He reports over 5 bowel movements per day which he describes as watery but today patient believes that they are becoming slightly thickened. Patient denies dietary indiscretion, new medications or recent travel. Patient recalls remote colonoscopy performed in [place] which according to him and the family member present was unremarkable. Laboratory data reveals mild anemia which appears present at least since beginning of March of this year. White blood cell count is nonelevated at 9.1. Chemistry panel reveals renal failure which appears at baseline with BUN of 25 and creatinine of 1.3. Liver profile on presentation reveals mild elevation in AST of 44 otherwise nonelevated bilirubin, nonelevated ALT and nonelevated alkaline phosphatase. Stool studies thus far negative including negative C. difficile and no PMNs. CT scan of the abdomen and pelvis obtained with IV contrast on presentation shows findings of enterocolitis.   Report is partially obstructing right inguinal hernia    Information sources:   -Patient  -family  -medical record  -health care team    PMHx:  Past Medical History:   Diagnosis Date    CKD (chronic kidney disease)     Fall     Hyperlipidemia     Hypertension     Iron deficiency anemia     Subdural hematoma (HCC)     Vitamin D deficiency        PSHx:  Past Surgical History:   Procedure Laterality Date    BACK SURGERY      CABG WITH AORTIC VALVE REPAIR         Meds:  Current Facility-Administered Medications   Medication Dose Route Frequency Provider Last Rate Last Admin    HYDROcodone-acetaminophen (NORCO) 5-325 MG per tablet 1 tablet  1 tablet Oral Q12H PRN Ranchita Covarrubias, APN        atorvastatin (LIPITOR) tablet 40 mg  40 mg Oral Daily Ranchita Covarrubias, APN   40 mg at 05/04/22 0904    famotidine (PEPCID) tablet 20 mg  20 mg Oral Daily Ranchita Covarrubias, APN   20 mg at 05/04/22 0904    metoprolol tartrate (LOPRESSOR) tablet 25 mg  25 mg Oral BID Ranchita Covarrubias, APN   25 mg at 05/04/22 8031    sodium chloride flush 0.9 % injection 5-40 mL  5-40 mL IntraVENous 2 times per day Ranchita Covarrubias, APN   10 mL at 05/03/22 2245    sodium chloride flush 0.9 % injection 5-40 mL  5-40 mL IntraVENous PRN Ranchita Covarrubias, APN        0.9 % sodium chloride infusion   IntraVENous PRN Ranchita Covarrubias, APN        ondansetron (ZOFRAN-ODT) disintegrating tablet 4 mg  4 mg Oral Q8H PRN Ranchita Covarrubias, APN        Or    ondansetron (ZOFRAN) injection 4 mg  4 mg IntraVENous Q6H PRN Ranchita Covarrubias, APN        polyethylene glycol (GLYCOLAX) packet 17 g  17 g Oral Daily PRN Ranchita Covarrubias, APN        acetaminophen (TYLENOL) tablet 650 mg  650 mg Oral Q6H PRN Ranchita Covarrubias, APN        Or    acetaminophen (TYLENOL) suppository 650 mg  650 mg Rectal Q6H PRN Ranchita Covarrubias, APN        lactobacillus (CULTURELLE) capsule 1 capsule  1 capsule Oral Daily Alaina Spurling, MD   1 capsule at 05/04/22 0904    latanoprost (XALATAN) 0.005 % ophthalmic solution 1 drop  1 drop Both Eyes Nightly Recardo Molina, APN        brimonidine (ALPHAGAN) 0.2 % ophthalmic solution 1 drop  1 drop Both Eyes BID Recardo Molina, APN   1 drop at 05/04/22 0904    timolol (TIMOPTIC) 0.5 % ophthalmic solution 1 drop  1 drop Both Eyes BID Recardo Molina, APN   1 drop at 05/04/22 5551       SocHx:  Social History     Socioeconomic History    Marital status:      Spouse name: Not on file    Number of children: Not on file    Years of education: Not on file    Highest education level: Not on file   Occupational History    Not on file   Tobacco Use    Smoking status: Never Smoker    Smokeless tobacco: Never Used   Substance and Sexual Activity    Alcohol use: Yes     Comment: occasionally    Drug use: Never    Sexual activity: Not on file   Other Topics Concern    Not on file   Social History Narrative    Not on file     Social Determinants of Health     Financial Resource Strain:     Difficulty of Paying Living Expenses: Not on file   Food Insecurity:     Worried About Running Out of Food in the Last Year: Not on file    Maynor of Food in the Last Year: Not on file   Transportation Needs:     Lack of Transportation (Medical): Not on file    Lack of Transportation (Non-Medical):  Not on file   Physical Activity:     Days of Exercise per Week: Not on file    Minutes of Exercise per Session: Not on file   Stress:     Feeling of Stress : Not on file   Social Connections:     Frequency of Communication with Friends and Family: Not on file    Frequency of Social Gatherings with Friends and Family: Not on file    Attends Orthodox Services: Not on file    Active Member of Clubs or Organizations: Not on file    Attends Club or Organization Meetings: Not on file    Marital Status: Not on file   Intimate Partner Violence:     Fear of Current or Ex-Partner: Not on file    Emotionally Abused: Not on file    Physically Abused: Not on file  Sexually Abused: Not on file   Housing Stability:     Unable to Pay for Housing in the Last Year: Not on file    Number of Places Lived in the Last Year: Not on file    Unstable Housing in the Last Year: Not on file       FamHx:History reviewed. No pertinent family history. Allergy:No Known Allergies      ROS: As described in the HPI and in addition is negative upon detailed review of systems or unobtainable unless otherwise stated in this dictation. PE:  BP (!) 167/44   Pulse 74   Temp 97.8 °F (36.6 °C) (Oral)   Resp 16   Ht 5' 11\" (1.803 m)   Wt 163 lb 5.8 oz (74.1 kg)   SpO2 96%   BMI 22.78 kg/m²     Gen.: NAD/elderly  male  Head: Atraumatic/normocephalic  Eyes: EOMI/anicteric sclera  ENT: Moist oral mucosa/no discharge from nose ears  Neck: Supple/trachea midline  Chest: CTA B/symmetrical excursions  Cor: Regular rhythm and rate  Abd.: Soft/NT/ND. BS +/4  Extr.:  No significant peripheral edema  Muscles:  Tone and bulk, consistent with age and condition  Skin: Warm and dry/anicteric      DATA:     Lab Results   Component Value Date    WBC 9.1 05/04/2022    RBC 3.72 05/04/2022    HGB 11.4 05/04/2022    HCT 35.6 05/04/2022    MCV 95.7 05/04/2022    MCH 30.6 05/04/2022    MCHC 32.0 05/04/2022    RDW 14.6 05/04/2022     05/04/2022    MPV 10.8 05/04/2022     Lab Results   Component Value Date     05/04/2022    K 4.3 05/04/2022     05/04/2022    CO2 21 05/04/2022    BUN 25 05/04/2022    CREATININE 1.3 05/04/2022    CALCIUM 8.6 05/04/2022    PROT 7.4 05/03/2022    LABALBU 4.1 05/03/2022    BILITOT 0.5 05/03/2022    ALKPHOS 122 05/03/2022    AST 44 05/03/2022    ALT 28 05/03/2022     Lab Results   Component Value Date    LIPASE 15 05/03/2022     No results found for: AMYLASE      ASSESSMENT/PLAN:  Patient Active Problem List   Diagnosis    Subdural hematoma (HCC)    Scalp laceration    Gastroenteritis    Nausea vomiting and diarrhea    Dehydration    Unilateral inguinal hernia without obstruction or gangrene    Diarrhea    Primary hypertension    Pure hypercholesterolemia    Stage 3a chronic kidney disease ([facility] [address]    History of transcatheter aortic valve replacement (TAVR)    History of subarachnoid hemorrhage     1. Diarrhea  -Acute diarrhea  -Presentation most consistent with infectious likely viral etiology  -Stool studies thus far negative  -Expect self-limiting course  -Continue supportive measures    2. Anemia  -Normocytic/no evidence of overt bleed  -Obtain iron studies and FOBT  -Monitor H&H  -No immediate plan for inpatient endoscopy unless overt bleed/precipitous drop in H&H    3.  Renal failure  -Appears chronic however no long-term review available  -Likely contributing to patient's anemia  -Per admitting/pertinent consultants    Above has been discussed with the patient and family member at bedside and all questions answered to their satisfaction. They verbalized understanding and agreement with the plan as delineated. Thank you for the opportunity to see this patient in consultation    Inez Baldwin MD  5/4/2022  2:14 PM    NOTE:  This report was transcribed using voice recognition software. Every effort was made to ensure accuracy; however, inadvertent computerized transcription errors may be present.

## 2022-05-04 NOTE — PROGRESS NOTES
bruits  Abdomen: soft, non-tender, non-distended, normal bowel sounds  Extremities: no cyanosis, no clubbing and no edema, right inguinal hernia  Neurologic: speech normal            Recent Labs     05/03/22  1406 05/04/22  0540    135   K 4.4 4.3    103   CO2 22 21*   BUN 26* 25*   CREATININE 1.3* 1.3*   GLUCOSE 105* 85   CALCIUM 9.3 8.6       Recent Labs     05/03/22  1406 05/04/22  0540   WBC 9.2 9.1   RBC 4.14 3.72*   HGB 12.9 11.4*   HCT 39.8 35.6*   MCV 96.1 95.7   MCH 31.2 30.6   MCHC 32.4 32.0   RDW 14.3 14.6   * 132   MPV 11.5 10.8           Assessment:    Principal Problem:    Gastroenteritis  Resolved Problems:    * No resolved hospital problems. *      Plan:  1. Diarrheal illness ? Gastroenteritis- viral vs infectious:  Pt presented to the ER from Assisted Living with diarrhea x 2  Days  with associated fever. ? Ill contact at [address] [facility] abdomen / pelvis reviewed showing findings of enterocolitis. Started on IV ceftriaxone/ IV flagyl. Rapid flu/ covid negative. Will see if can add on viral panel. Send stool for c-diff Received IVF in ER. Appears hydrated currently. Monitor labs/ vitals. Consult GI. Hold off on antibiotics for now. Lactobacillus. C-diff negative. Reporting diarrhea still ongoing, but  feeling better and less weak today.      2. Fevers; monitor. Likely related to above. Reporting fevers 102 at home. T 100.2 on admission. Cultures sent. CXR with left sided atelectasis. Check UA- wnl. No fevers overnight .     3. CKD: creatinine appears close to baseline- monitor. Stop IVF.      4. Possible partially obstructed right inguinal hernia- pt reporting known hernia and not causing any recent pain/ discomfort. Monitor symptoms for now.      5. Recent mechanical fall with scalp injury/ subdural hematoma: pt is following with neurosurgery outpt with serial CT scans. Last CT scan 4/7- he was to follow up in 2 months and to avoid anticoagulation/ antiplatelet medications.  Denies HA/ weakness.      6. HTN:  On metoprolol      7. HLD-continue lipitor     8. H/o iron deficiency anemia     9. H/o aortic valve replacement      10. Deconditioning: reporting he uses a walker at Assisted Living: PT/OT     11. Pre- dm: hga1c in January 1.3 diet controlled. Monitor blood sugars       Dispo: PT/OT- possibly dc later today vs tomorrow         Code Status: DNR CCA  DVT prophylaxis: contraindicated due to recent subdural hematoma          NOTE: This report was transcribed using voice recognition software. Every effort was made to ensure accuracy; however, inadvertent computerized transcription errors may be present.   Electronically signed by JAVIER Castro on 5/4/2022 at 8:20 AM

## 2022-05-04 NOTE — PROGRESS NOTES
Physical Therapy  Facility/Department: [facility]  Physical Therapy Initial Assessment    Name: Aniket Ward  : 1930  MRN: [id number]  Date of Service: 2022      Attending Provider:  Cecy Christian MD    Evaluating PT:  Lissette Sam. Kendall Castorena P.T. Room #:  6492/1154-Z  Diagnosis:  Dehydration [E86.0]  Gastroenteritis [K52.9]  Nausea vomiting and diarrhea [R11.2, R19.7]  Unilateral inguinal hernia without obstruction or gangrene, recurrence not specified [K40.90]  Pertinent PMHx/PSHx:  SDH 3/12/22  Precautions:  Falls, bed/chair alarm if someone not with pt    SUBJECTIVE:    Pt lives at [facility] and ambulated with rollator ww. He walked to and from cafeteria 3x day for meals PTA. Pt has a WC if needed. OBJECTIVE:   Initial Evaluation  Date: 22 Treatment Short Term/ Long Term   Goals   Was pt agreeable to Eval/treatment? yes     Does pt have pain? No c/o pain     Bed Mobility  Rolling: supervision  Supine to sit: supervision  Sit to supine: NA  Scooting: supervision  Independent    Transfers Sit to stand: SBA  Stand to sit: SBA  Stand pivot: MIN A with ww  supervision   Ambulation   60 feet with ww MIN A  200 feet with ww or rollator ww with supervision   Stair negotiation: ascended and descended NA  NA   AM-PAC 6 Clicks 33/03       BLE ROM is WFL. BLE strength is grossly 4-/5 to 4/5. Sensation:  Pt chronic numbness B feet since past back surgery. Balance: sitting is supervision and standing with ww is SBA  Endurance: fair    ASSESSMENT:    Conditions Requiring Skilled Therapeutic Intervention:    [x]Decreased strength     []Decreased ROM  [x]Decreased functional mobility  [x]Decreased balance   [x]Decreased endurance   [x]Decreased posture  []Decreased sensation  []Decreased coordination   []Decreased vision  []Decreased safety awareness   []Increased pain   Comments:  Pt was found in bed and agreeable to PT.  He states he feels much better than yesterday and son confirms pt was unable to get OOB on his own yesterday. Pt moving better today per son report, but walked with slow than his normal gait speed and c/o fatigue that limited his amb distance. Pt walked with flexed trunk posture that seemed to get worse with amb distance pt required MIN A for balance and safety with amb. Treatment:  Patient practiced and was instructed in the following treatment:     Bed mobility, transfers, and gait with ww to improve functional strength, balance, posture, and endurance. Pt was left sitting up in chair with call light left by patient and son was present with pt.    Pt's/ family goals   1. To go back to [facility]. Patient and or family understand(s) diagnosis, prognosis, and plan of care. PHYSICAL THERAPY PLAN OF CARE:    PT POC is established based on physician order and patient diagnosis     Referring provider/PT Order:  PT eval and treat  Diagnosis:  Dehydration [E86.0]  Gastroenteritis [K52.9]  Nausea vomiting and diarrhea [R11.2, R19.7]  Unilateral inguinal hernia without obstruction or gangrene, recurrence not specified [K40.90]  Specific instructions for next treatment:  Increase amb distance as pt is able.      Current Treatment Recommendations:     [x] Strengthening to improve independence with functional mobility   [] ROM to improve ROM and decrease spasm and pain which will help promote independence with functional mobility   [x] Balance Training to improve static/dynamic balance and to reduce fall risk  [x] Endurance Training to improve activity tolerance during functional mobility   [x] Transfer Training to improve safety and independence with all functional transfers   [x] Gait Training to improve gait mechanics, endurance and assess need for appropriate assistive device  [] Stair Training in preparation for safe discharge home and/or into the community   [] Positioning to prevent skin breakdown and contractures  [] Safety and Education Training   [x] Patient/Caregiver Education   [] HEP  [] Other     PT long term treatment goals are located in above grid    Frequency of treatments: 2-5x/week x 1-2 weeks. Time in  13:15  Time out  13:40    Total Treatment Time  10 minutes     Evaluation Time includes thorough review of current medical information, gathering information on past medical history/social history and prior level of function, completion of standardized testing/informal observation of tasks, assessment of data and education on plan of care and goals. CPT codes:  [x] Low Complexity PT evaluation 18642  [] Moderate Complexity PT evaluation 26797  [] High Complexity PT evaluation 10463  [] PT Re-evaluation 83312  [] Gait training 74954 ** minutes  [] Manual therapy 76013 ** minutes  [x] Therapeutic activities 51068 10 minutes  [] Therapeutic exercises 78036 ** minutes  [] Neuromuscular reeducation 05039 ** minutes     Koko Velez., P.T.   License Number: [id number]

## 2022-05-04 NOTE — PLAN OF CARE
Problem: Skin/Tissue Integrity  Goal: Absence of new skin breakdown  Outcome: Progressing     Problem: Safety - Adult  Goal: Free from fall injury  Outcome: Progressing

## 2022-05-04 NOTE — CARE COORDINATION
5/4/2022  Social Work Discharge Planning:SW discussed discharge planning with Pt. Pt is from the [facility] at [facility]. and plans to return. Awaiting therapy evals. Pt uses a ww there. Pt is on room air.  Electronically signed by KIMI Byrd on 5/4/2022 at 12:06 PM

## 2022-05-05 LAB
ANION GAP SERPL CALCULATED.3IONS-SCNC: 12 MMOL/L (ref 7–16)
BUN BLDV-MCNC: 29 MG/DL (ref 6–23)
CALCIUM SERPL-MCNC: 8.6 MG/DL (ref 8.6–10.2)
CHLORIDE BLD-SCNC: 101 MMOL/L (ref 98–107)
CHOLESTEROL, TOTAL: 106 MG/DL (ref 0–199)
CO2: 20 MMOL/L (ref 22–29)
CREAT SERPL-MCNC: 1.4 MG/DL (ref 0.7–1.2)
FERRITIN: 372 NG/ML
FOLATE: >20 NG/ML (ref 4.8–24.2)
GFR AFRICAN AMERICAN: 57
GFR NON-AFRICAN AMERICAN: 47 ML/MIN/1.73
GLUCOSE BLD-MCNC: 107 MG/DL (ref 74–99)
HDLC SERPL-MCNC: 49 MG/DL
IRON SATURATION: 12 % (ref 20–55)
IRON: 23 MCG/DL (ref 59–158)
LDL CHOLESTEROL CALCULATED: 42 MG/DL (ref 0–99)
OCCULT BLOOD DIAGNOSTIC: NORMAL
ORGANISM: ABNORMAL
POTASSIUM SERPL-SCNC: 3.8 MMOL/L (ref 3.5–5)
ROTAVIRUS ANTIGEN: ABNORMAL
SODIUM BLD-SCNC: 133 MMOL/L (ref 132–146)
TOTAL IRON BINDING CAPACITY: 188 MCG/DL (ref 250–450)
TRIGL SERPL-MCNC: 77 MG/DL (ref 0–149)
VITAMIN B-12: 1391 PG/ML (ref 211–946)
VLDLC SERPL CALC-MCNC: 15 MG/DL

## 2022-05-05 PROCEDURE — 82746 ASSAY OF FOLIC ACID SERUM: CPT

## 2022-05-05 PROCEDURE — 36415 COLL VENOUS BLD VENIPUNCTURE: CPT

## 2022-05-05 PROCEDURE — 97165 OT EVAL LOW COMPLEX 30 MIN: CPT

## 2022-05-05 PROCEDURE — 99233 SBSQ HOSP IP/OBS HIGH 50: CPT | Performed by: FAMILY MEDICINE

## 2022-05-05 PROCEDURE — 97530 THERAPEUTIC ACTIVITIES: CPT

## 2022-05-05 PROCEDURE — G0378 HOSPITAL OBSERVATION PER HR: HCPCS

## 2022-05-05 PROCEDURE — 82728 ASSAY OF FERRITIN: CPT

## 2022-05-05 PROCEDURE — APPSS30 APP SPLIT SHARED TIME 16-30 MINUTES: Performed by: NURSE PRACTITIONER

## 2022-05-05 PROCEDURE — 97535 SELF CARE MNGMENT TRAINING: CPT

## 2022-05-05 PROCEDURE — 80048 BASIC METABOLIC PNL TOTAL CA: CPT

## 2022-05-05 PROCEDURE — 1200000000 HC SEMI PRIVATE

## 2022-05-05 PROCEDURE — 6370000000 HC RX 637 (ALT 250 FOR IP): Performed by: NURSE PRACTITIONER

## 2022-05-05 PROCEDURE — 82607 VITAMIN B-12: CPT

## 2022-05-05 PROCEDURE — 83540 ASSAY OF IRON: CPT

## 2022-05-05 PROCEDURE — 83550 IRON BINDING TEST: CPT

## 2022-05-05 PROCEDURE — 2580000003 HC RX 258: Performed by: NURSE PRACTITIONER

## 2022-05-05 PROCEDURE — 6370000000 HC RX 637 (ALT 250 FOR IP): Performed by: FAMILY MEDICINE

## 2022-05-05 PROCEDURE — 80061 LIPID PANEL: CPT

## 2022-05-05 RX ORDER — LOPERAMIDE HYDROCHLORIDE 2 MG/1
2 CAPSULE ORAL 4 TIMES DAILY PRN
Status: DISCONTINUED | OUTPATIENT
Start: 2022-05-05 | End: 2022-05-05

## 2022-05-05 RX ORDER — LOPERAMIDE HYDROCHLORIDE 2 MG/1
2 CAPSULE ORAL 4 TIMES DAILY PRN
Refills: 0 | COMMUNITY
Start: 2022-05-05 | End: 2022-09-08

## 2022-05-05 RX ORDER — LACTOBACILLUS RHAMNOSUS GG 10B CELL
1 CAPSULE ORAL DAILY
Qty: 30 CAPSULE | Refills: 0 | Status: ON HOLD | DISCHARGE
Start: 2022-05-06 | End: 2022-07-10

## 2022-05-05 RX ADMIN — TIMOLOL MALEATE 1 DROP: 5 SOLUTION OPHTHALMIC at 08:05

## 2022-05-05 RX ADMIN — SODIUM CHLORIDE, PRESERVATIVE FREE 10 ML: 5 INJECTION INTRAVENOUS at 08:05

## 2022-05-05 RX ADMIN — FAMOTIDINE 20 MG: 20 TABLET ORAL at 08:05

## 2022-05-05 RX ADMIN — METOPROLOL TARTRATE 25 MG: 25 TABLET, FILM COATED ORAL at 20:55

## 2022-05-05 RX ADMIN — BISMUTH SUBSALICYLATE 30 ML: 525 LIQUID ORAL at 14:04

## 2022-05-05 RX ADMIN — SODIUM CHLORIDE, PRESERVATIVE FREE 10 ML: 5 INJECTION INTRAVENOUS at 20:56

## 2022-05-05 RX ADMIN — METOPROLOL TARTRATE 25 MG: 25 TABLET, FILM COATED ORAL at 08:04

## 2022-05-05 RX ADMIN — ATORVASTATIN CALCIUM 40 MG: 40 TABLET, FILM COATED ORAL at 08:05

## 2022-05-05 RX ADMIN — TIMOLOL MALEATE 1 DROP: 5 SOLUTION OPHTHALMIC at 20:55

## 2022-05-05 RX ADMIN — BRIMONIDINE TARTRATE 1 DROP: 2 SOLUTION OPHTHALMIC at 08:05

## 2022-05-05 RX ADMIN — BRIMONIDINE TARTRATE 1 DROP: 2 SOLUTION OPHTHALMIC at 20:55

## 2022-05-05 RX ADMIN — Medication 1 CAPSULE: at 08:04

## 2022-05-05 RX ADMIN — LATANOPROST 1 DROP: 50 SOLUTION OPHTHALMIC at 20:55

## 2022-05-05 ASSESSMENT — PAIN SCALES - GENERAL: PAINLEVEL_OUTOF10: 0

## 2022-05-05 NOTE — PLAN OF CARE
Problem: ABCDS Injury Assessment  Goal: Absence of physical injury  Outcome: Progressing     Problem: Skin/Tissue Integrity  Goal: Absence of new skin breakdown  Description: 1. Monitor for areas of redness and/or skin breakdown  2. Assess vascular access sites hourly  3. Every 4-6 hours minimum:  Change oxygen saturation probe site  4. Every 4-6 hours:  If on nasal continuous positive airway pressure, respiratory therapy assess nares and determine need for appliance change or resting period.   5/5/2022 0323 by Freida Cerda RN  Outcome: Progressing  5/4/2022 1549 by Srinivasan Mondragon RN  Outcome: Progressing     Problem: Discharge Planning  Goal: Discharge to home or other facility with appropriate resources  Outcome: Progressing     Problem: Safety - Adult  Goal: Free from fall injury  5/5/2022 0323 by Freida Cerda RN  Outcome: Adequate for Discharge  5/4/2022 1549 by Srinivasan Mondragon RN  Outcome: Progressing

## 2022-05-05 NOTE — CARE COORDINATION
5/5/2022  Social Work Discharge Planning:SW was notified by Renetta Alston with [facility] that Pt and his family would like Pt to get some rehab. [facility] is able to accept and will start precert. IRIS, 7000 and transport form are in chart. Electronically signed by KIMI Reyes on 5/5/2022 at 10:51 AM

## 2022-05-05 NOTE — PROGRESS NOTES
PROGRESS NOTE  By Lori Carter M.D. [facility]  Dr. Lorenzo Clayton M.D.,  Dr. Renée Og M.D.,   Dr. Amira Mesa D.O.,  Dr. Belgica Desouza M.D.,  Dr. Liliana Sharp D.O.,  Rafaela Curtis D.O.         [facility] Heritage  80 y.o.  male    SUBJECTIVE:  Denies abdominal pain; reports soft bowel movement this morning but subsequently watery diarrhea    OBJECTIVE:    /72   Pulse 75   Temp 98.2 °F (36.8 °C) (Oral)   Resp 18   Ht 5' 11\" (1.803 m)   Wt 160 lb 0.9 oz (72.6 kg)   SpO2 93%   BMI 22.32 kg/m²     General: NAD/elderly  male  HEENT: Anicteric sclera/moist oral mucosa  Neck: Supple/trachea midline  Chest: Symmetric excursion/nonlabored respirations  Abd.: Soft and nontender  Extr.:  Decreased muscle tone and bulk throughout, consistent with age and condition  Skin: Warm and dry      DATA:       Lab Results   Component Value Date    WBC 9.1 05/04/2022    RBC 3.72 05/04/2022    HGB 11.4 05/04/2022    HCT 35.6 05/04/2022    MCV 95.7 05/04/2022    MCH 30.6 05/04/2022    MCHC 32.0 05/04/2022    RDW 14.6 05/04/2022     05/04/2022    MPV 10.8 05/04/2022     Lab Results   Component Value Date     05/05/2022    K 3.8 05/05/2022    K 4.3 05/04/2022     05/05/2022    CO2 20 05/05/2022    BUN 29 05/05/2022    CREATININE 1.4 05/05/2022    CALCIUM 8.6 05/05/2022    PROT 7.4 05/03/2022    LABALBU 4.1 05/03/2022    BILITOT 0.5 05/03/2022    ALKPHOS 122 05/03/2022    AST 44 05/03/2022    ALT 28 05/03/2022     Lab Results   Component Value Date    LIPASE 15 05/03/2022     No results found for: AMYLASE      ASSESSMENT/PLAN:  Patient Active Problem List   Diagnosis    Subdural hematoma (HCC)    Scalp laceration    Gastroenteritis    Nausea vomiting and diarrhea    Dehydration    Unilateral inguinal hernia without obstruction or gangrene    Diarrhea    Primary hypertension    Pure hypercholesterolemia    Stage 3a chronic kidney disease ([facility] [address]    History of transcatheter aortic valve replacement (TAVR)    History of subarachnoid hemorrhage     1. Diarrhea  -Acute diarrhea  -Presentation most consistent with infectious likely viral etiology  -Rotavirus positive in the stool  -Continue supportive measures     2. Anemia  -Chronic at least since March  -Normocytic/iron deficient  -Positive FOBT but no evidence of overt bleed without blood in the stool or melena  -Monitor H&H  -No immediate plan for inpatient endoscopy unless overt bleed/precipitous drop in H&H  -Follow-up as outpatient  -consider endoscopy depending: Clinical course/H&H dynamics and goals of care     3. Renal failure  -Appears chronic however no long-term review available  -Likely contributing to patient's anemia  -Per admitting/pertinent consultants     Above has been discussed with the patient and family member at bedside and all questions answered to their satisfaction. They verbalized understanding and agreement with the plan as delineated. No immediate plans for intervention from GI POV  We will see as needed in the hospital -please, call with questions/concerns. Thank you for the opportunity to participate in the care of . Rhonda Mccain MD  5/5/2022  2:22 PM    NOTE:  This report was transcribed using voice recognition software. Every effort was made to ensure accuracy; however, inadvertent computerized transcription errors may be present.

## 2022-05-05 NOTE — PROGRESS NOTES
Occupational Therapy  OCCUPATIONAL THERAPY INITIAL EVALUATION  [facility] [address]    Date: 2022     Patient Name: Iglesia Bella  MRN: [id number]  : 1930  Room: [address] [place] [address]    Evaluating OT: Douglas Felix, OTR/L - [id number]    Referring Provider: Joi Bragg MD  Specific Provider Orders/Date: \"OT eval and treat\" - 2022    Diagnosis: Dehydration [E86.0], Gastroenteritis [K52.9], Nausea vomiting and diarrhea [R11.2, R19.7], Unilateral inguinal hernia without obstruction or gangrene, recurrence not specified [K40.90]     Pertinent Medical History: recent subdural hematoma (3/12/22), HTN, CKD     Precautions: fall risk, contact isolation (rotavirus)    Assessment of Current Deficits:    [x] Functional mobility   [x]ADLs  [x] Strength               [x]Cognition   [x] Functional transfers   [x] IADLs         [x] Safety Awareness   [x]Endurance   [] Fine Coordination              [x] Balance      [] Vision/perception   [x]Sensation    []Gross Motor Coordination  [] ROM  [] Delirium                   [] Motor Control     OT PLAN OF CARE   OT POC is based on physician orders, patient diagnosis, and results of clinical assessment.   Frequency/Duration 2-5 days/week for 2 weeks PRN   Specific OT Treatment Interventions to Include:   * Instruction/training on adapted ADL techniques and AE recommendations to increase functional independence within precautions       * Training on energy conservation strategies, correct breathing pattern and techniques to improve independence/tolerance for self-care routine  * Functional transfer/mobility training/DME recommendations for increased independence, safety, and fall prevention  * Patient/Family education to increase follow through with safety techniques and functional independence  * Recommendation of environmental modifications for increased safety with functional transfers/mobility and ADLs  * Therapeutic exercise to improve motor endurance, ROM, and functional strength for ADLs/functional transfers  * Therapeutic activities to facilitate/challenge dynamic balance, stand tolerance for increased safety and independence with ADLs  * Neuro-muscular re-education: facilitation of righting/equilibrium reactions, midline orientation, scapular stability/mobility, normalization of muscle tone, and facilitation of volitional active controled movement    Recommended Adaptive Equipment: TBD     Home Living: Patient is a resident at [facility] at [address] [place] [address]. Equipment Owned: rollator    Prior Level of Function (PLOF): Patient was independent with ADLs and functional mobility (with rollator) prior to this hospitalization. [facility] staff completes IADLs. Pain Level: Patient denied experiencing pain. Cognition: Patient alert and oriented x3. WFL command follow demonstrated. Patient pleasant, cooperative, and motivated to return to [employer] and home environment. Memory: WFL  Sequencing: WFL  Problem Solving: WFL grossly  Judgement/Safety: WFL grossly    Functional Assessment:  AM-PAC Daily Activity Raw Score: 16/24   Initial Eval Status  Date: 5/5/2022 Treatment Status  Date:  Short Term Goals = Long Term Goals   Feeding Setup  Independent   Grooming Min A  Mod I  (seated/standing at sink)   UB Dressing Min A to [facility]/don hospital gown while seated in bedside chair. Mod I  (including item retrieval)   LB Dressing Mod A  Mod I / Independent - with use of AE, as needed/appropriate   Bathing Mod A  Mod I / Independent - with use of AE/DME, as needed/appropriate   Toileting Min A  Mod I / Independent   Bed Mobility  Not assessed. Patient seated in bedside chair upon start and conclusion of this session. Mod I / [address] in order to maximize patient's independence with ADLs, re-positioning, and other functional tasks.    Functional Transfers Sit-to-Stand: Min A   from bedside chair  Independent   Functional Mobility Min A   (with walker) within patient's room and bathroom. Cues given to maximize safety with management of walker. Mod I with functional mobility (with device, as needed/appropriate) in order to maximize independence with ADLs/IADLs and other functional tasks. Balance Sitting: Good  (at EOB)  Standing: Fair-  (with walker)  Fair+ dynamic standing balance during completion of ADLs/IADLs and other functional tasks. Activity Tolerance Fair  Patient will demonstrate Good understanding and consistent implementation of energy conservation techniques and work simplification techniques into ADL/IADL routines. Visual/  Perceptual WFL  Patient wears glasses, as needed. N/A   B UE Strength 4-/5  Patient will demonstrate 4+/5 B UE strength in order to maximize independence with ADLs and functional transfers. Additional Long-Term Goal: Patient will increase functional independence to PLOF in order to allow patient to live in least restrictive environment. ROM: Additional Information:    R UE  WFL    L UE WFL      Hearing: WFL  Sensation: Patient reported experiencing numbness/tingling in his L hand and B feet, which he attributed to back surgery. Tone: WFL  Edema: No    Comments: RN approved patient's participation in [address] [place] [address] activities. Upon arrival, patient seated in bedside chair. At end of session, patient seated in bedside chair with call light and phone within reach and all lines and tubes intact. Patient would benefit from continued skilled OT to increase safety and independence with completion of ADL/IADL tasks for functional independence and quality of life. Treatment: OT treatment provided this date included:    Instruction/training on safety and adapted techniques for completion of ADLs.   Instruction/training on safe functional mobility/transfer techniques.      Patient education provided regardin) potential benefits of continued therapy services upon discharge, 2) importance of having staff assistance with ADLs and other OOB activities to prevent falls/injury during hospitalization, 3) techniques to maximize safety with management of walker. Patient indicated understanding. Further skilled OT treatment indicated to increase patient's safety and independence with completion of ADL/IADL tasks in order to maximize patient's functional independence and quality of life. Rehab Potential: Good for established goals. Patient / Family Goal: Patient anticipates going to a SNF/[facility] upon discharge. Patient and/or family were instructed on functional diagnosis, prognosis/goals, and OT plan of care. Demonstrated Good understanding. Eval Complexity: Low    Time In: 1150  Time Out: 1215  Total Treatment Time: 10 minutes      Minutes Units   OT Eval Low 09492 15 1   OT Eval Medium 69539     OT Eval High 61230     OT Re-Eval [id number]     Therapeutic Ex 59069     Therapeutic Activities 33085     ADL/Self Care 49894 10 1   Orthotic Management 59771     Neuro Re-Ed 48055     Non-Billable Time N/A ---     Evaluation time includes thorough review of current medical information, gathering information on past medical history/social history and prior level of function, completion of standardized testing/informal observation of tasks, assessment of data, and education on plan of care and goals. Lauren L. Vann Brunner, SYLVIA/L  License Number: [id number]

## 2022-05-05 NOTE — DISCHARGE INSTR - COC
Continuity of Care Form    Patient Name: Xander Rahman   :  1930  MRN:  [id number]    Admit date:  5/3/2022  Discharge date:  22***    Code Status Order: DNR-CCA   Advance Directives:      Admitting Physician:  Irma Reis MD  PCP: No primary care provider on file. Discharging Nurse: [facility] Unit/Room#: 3898/3365-X  Discharging Unit Phone Number: ***    Emergency Contact:   Extended Emergency Contact Information  Primary Emergency Contact: Julee Mckeonlon, [address] Phone: [phone]  Mobile Phone: [phone]  Relation: Child  Preferred language: English   needed? No  Secondary Emergency Contact: Cynthia Francisco  [place] Phone: [phone]  Relation: Child   needed? No    Past Surgical History:  Past Surgical History:   Procedure Laterality Date    BACK SURGERY      CABG WITH AORTIC VALVE REPAIR         Immunization History:   Immunization History   Administered Date(s) Administered    Td (Adult), 2 Lf Tetanus Toxoid, Pf (Td, Absorbed) 2022       Active Problems:  Patient Active Problem List   Diagnosis Code    Subdural hematoma ([facility] [address] S06.5X9A    Scalp laceration S01. 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    History of subarachnoid hemorrhage Z86.79       Isolation/Infection:   Isolation            No Isolation          Patient Infection Status       Infection Onset Added Last Indicated Last Indicated By Review Planned Expiration Resolved Resolved By    None active    Resolved    COVID-19 (Rule Out) 22 Respiratory Panel, Molecular, with COVID-19 (Restricted: peds pts or suitable admitted adults) (Ordered)   22 Rule-Out Test Resulted    COVID-19 (Rule Out) 05/03/22 05/03/22 05/03/22 COVID-19, Rapid (Ordered)   05/03/22 Rule-Out Test Resulted    C-diff Rule Out 05/03/22 05/03/22 05/04/22 CLOSTRIDIUM DIFFICILE EIA (Ordered)   05/04/22 Rule-Out Test Resulted            Nurse Assessment:  Last Vital Signs: /72   Pulse 75   Temp 98.2 °F (36.8 °C) (Oral)   Resp 18   Ht 5' 11\" (1.803 m)   Wt 160 lb 0.9 oz (72.6 kg)   SpO2 93%   BMI 22.32 kg/m²     Last documented pain score (0-10 scale): Pain Level: 0  Last Weight:   Wt Readings from Last 1 Encounters:   05/05/22 160 lb 0.9 oz (72.6 kg)     Mental Status:  oriented, alert, and coherent    IV Access:  - None    Nursing Mobility/ADLs:  Walking   Assisted  Transfer  Assisted  Bathing  Assisted  Dressing  Assisted  Toileting  Assisted  Feeding  Assisted  Med Admin  Assisted  Med Delivery   whole    Wound Care Documentation and Therapy:        Elimination:  Continence: Bowel: {YES / YT:97888}  Bladder: {YES / PU:48401}  Urinary Catheter: None   Colostomy/Ileostomy/Ileal Conduit: {YES / QB:61662}       Date of Last BM: ***    Intake/Output Summary (Last 24 hours) at 5/5/2022 1052  Last data filed at 5/4/2022 1610  Gross per 24 hour   Intake 180.5 ml   Output --   Net 180.5 ml     I/O last 3 completed shifts: In: 180.5 [P.O.:180; I.V.:0.5]  Out: 500 [Urine:500]    Safety Concerns: At Risk for Falls    Impairments/Disabilities:      None    Nutrition Therapy:  Current Nutrition Therapy:   - Oral Diet:  General    Routes of Feeding: None  Liquids: {Slp liquid thickness:04670}  Daily Fluid Restriction: no  Last Modified Barium Swallow with Video (Video Swallowing Test): {Done Not Done RWCB:077020909}    Treatments at the Time of Hospital Discharge:   Respiratory Treatments: ***  Oxygen Therapy:  is not on home oxygen therapy.   Ventilator:    - No ventilator support    Rehab Therapies: {THERAPEUTIC INTERVENTION:5706973345}  Weight Bearing Status/Restrictions: No weight bearing restrictions  Other Medical Equipment (for information only, NOT a DME order):  {EQUIPMENT:870674562}  Other Treatments: ***    Patient's personal belongings (please select all that are sent with patient):  Glasses    RN SIGNATURE:  Electronically signed by Nusrat Kimble RN on 5/6/22 at 2:57 PM EDT    CASE MANAGEMENT/SOCIAL WORK SECTION    Inpatient Status Date: ***    Readmission Risk Assessment Score:  Readmission Risk              Risk of Unplanned Readmission:  13           Discharging to Facility/ Agency   Name: [facility] [id number]  TNITR:[phone]  EMT:[phone]    Dialysis Facility (if applicable)   Name:  Address:  Dialysis Schedule:  Phone:  Fax:    / signature: Electronically signed by KIMI Santamaria on 5/5/2022 at 10:52 AM      PHYSICIAN SECTION    Prognosis: Good    Condition at Discharge: Stable    Rehab Potential (if transferring to Rehab): Good    Recommended Labs or Other Treatments After Discharge: BMP 5/9/22;  results to SNF MD    Physician Certification: I certify the above information and transfer of Xander Rahman  is necessary for the continuing treatment of the diagnosis listed and that he requires skilled snf for greater 30 days.      Update Admission H&P: No change in H&P    PHYSICIAN SIGNATURE:  Electronically signed by Irma Reis MD on 5/6/22 at 9:01 AM EDT

## 2022-05-05 NOTE — PLAN OF CARE
Problem: ABCDS Injury Assessment  Goal: Absence of physical injury  5/5/2022 1329 by Manish Barnett RN  Outcome: Progressing  5/5/2022 0323 by Selene Weber RN  Outcome: Progressing     Problem: Skin/Tissue Integrity  Goal: Absence of new skin breakdown  Description: 1. Monitor for areas of redness and/or skin breakdown  2. Assess vascular access sites hourly  3. Every 4-6 hours minimum:  Change oxygen saturation probe site  4. Every 4-6 hours:  If on nasal continuous positive airway pressure, respiratory therapy assess nares and determine need for appliance change or resting period.   5/5/2022 1329 by Manish Barnett RN  Outcome: Progressing  5/5/2022 0323 by Selene Weber RN  Outcome: Progressing     Problem: Safety - Adult  Goal: Free from fall injury  5/5/2022 1329 by Manish Barnett RN  Outcome: Progressing  5/5/2022 0323 by Selene Weber RN  Outcome: Adequate for Discharge     Problem: Discharge Planning  Goal: Discharge to home or other facility with appropriate resources  5/5/2022 1329 by Manish Barnett RN  Outcome: Progressing  5/5/2022 0323 by Selene Weber RN  Outcome: Progressing

## 2022-05-05 NOTE — PROGRESS NOTES
stood at sink and washed his hands with SBA/CGA while leaning against sink for support. Pt then walked with ww back to his bed and laid down. While pt was on commode pt's chair and floor were cleaned with Clorox wipes. Linens were replaced as well. Pt's uncontrollable watery diarrhea limited further amb distance at this time as it comes on without warning and pt did not want to risk being in the [place] if it happened again. Treatment:  Patient practiced and was instructed in the following treatment:     Bed mobility, transfers, ADLs, standing, and gait with ww to improve functional strength, balance, and endurance. Pt was left supine in bed with call light left by patient. Chair/bed alarm: bed alarm was activated. PLAN:    Pt is making good progress toward established Physical Therapy goals. Continue with physical therapy current plan of care. Time in  08:20  Time out  08:45    Total Treatment Time  25 minutes     Evaluation Time includes thorough review of current medical information, gathering information on past medical history/social history and prior level of function, completion of standardized testing/informal observation of tasks, assessment of data and education on plan of care and goals. CPT codes:  [] Low Complexity PT evaluation 01139  [] Moderate Complexity PT evaluation 32770  [] High Complexity PT evaluation 09684  [] PT Re-evaluation 57662  [] Gait training 55240 ** minutes  [] Manual therapy 10035 ** minutes  [x] Therapeutic activities 94726 25 minutes  [] Therapeutic exercises 12451 ** minutes  [] Neuromuscular reeducation 47320 ** minutes     Koko Moore., P.T.   License Number: [id number]

## 2022-05-05 NOTE — PROGRESS NOTES
[facility] Hospitalist   Progress Note    Admitting Date and Time: 5/3/2022  1:05 PM  Admit Dx: Dehydration [E86.0]  Gastroenteritis [K52.9]  Nausea vomiting and diarrhea [R11.2, R19.7]  Unilateral inguinal hernia without obstruction or gangrene, recurrence not specified [K40.90]    Subjective:    Pt feels lousy today. States he has had multiple episodes of diarrhea. Stool cultures pending. Denies any new concerns at this time. Per RN: No new concerns at this time. ROS: denies fever, chills, cp, sob, n/v, HA unless stated above.      atorvastatin  40 mg Oral Daily    famotidine  20 mg Oral Daily    metoprolol tartrate  25 mg Oral BID    sodium chloride flush  5-40 mL IntraVENous 2 times per day    lactobacillus  1 capsule Oral Daily    latanoprost  1 drop Both Eyes Nightly    brimonidine  1 drop Both Eyes BID    timolol  1 drop Both Eyes BID     loperamide, 2 mg, 4x Daily PRN  HYDROcodone-acetaminophen, 1 tablet, Q12H PRN  sodium chloride flush, 5-40 mL, PRN  sodium chloride, , PRN  ondansetron, 4 mg, Q8H PRN   Or  ondansetron, 4 mg, Q6H PRN  polyethylene glycol, 17 g, Daily PRN  acetaminophen, 650 mg, Q6H PRN   Or  acetaminophen, 650 mg, Q6H PRN         Objective:    /72   Pulse 75   Temp 98.2 °F (36.8 °C) (Oral)   Resp 18   Ht 5' 11\" (1.803 m)   Wt 160 lb 0.9 oz (72.6 kg)   SpO2 93%   BMI 22.32 kg/m²   General Appearance: alert and oriented to person, place and time and in no acute distress  Skin: warm and dry  Head: normocephalic and atraumatic  Eyes: pupils equal, round, and reactive to light, extraocular eye movements intact, conjunctivae normal  Neck: neck supple and non tender without mass   Pulmonary/Chest: clear to auscultation bilaterally- no wheezes, rales or rhonchi, normal air movement, no respiratory distress  Cardiovascular: normal rate, normal S1 and S2 and no RGM  Abdomen: soft, non-tender, non-distended, normal bowel sounds  Extremities: no cyanosis, no clubbing and no edema  Neurologic: no cranial nerve deficit and speech normal      Recent Labs     05/03/22  1406 05/04/22  0540    135   K 4.4 4.3    103   CO2 22 21*   BUN 26* 25*   CREATININE 1.3* 1.3*   GLUCOSE 105* 85   CALCIUM 9.3 8.6       Recent Labs     05/03/22  1406   ALKPHOS 122   PROT 7.4   LABALBU 4.1   BILITOT 0.5   AST 44*   ALT 28       Recent Labs     05/03/22  1406 05/04/22  0540   WBC 9.2 9.1   RBC 4.14 3.72*   HGB 12.9 11.4*   HCT 39.8 35.6*   MCV 96.1 95.7   MCH 31.2 30.6   MCHC 32.4 32.0   RDW 14.3 14.6   * 132   MPV 11.5 10.8            Radiology:   CT ABDOMEN PELVIS W IV CONTRAST Additional Contrast? None   Final Result   Findings of enterocolitis, with predominant enteritis component. Partially obstructing right inguinal hernia to be correlate initially with   clinical examination to the terminate if the area is reducible or not. If   not reducible surgical consultation indicated for the hernia. Distended the bladder at the time the present study. Cannot exclude urinary   bladder retention. Please correlate clinically. RECOMMENDATIONS:   Unavailable         XR CHEST PORTABLE   Final Result   Suspected atelectasis at the left base. Assessment:  Principal Problem:    Gastroenteritis  Resolved Problems:    * No resolved hospital problems. *      Plan:  1. Diarrhea- 2/2 rotavirus. CT A/P enterocolitis. Received ceftriaxone/Flagyl in ED course. Rapid flu/COVID-19 negative. Received IVF hydration in ED. C. difficile negative. Stool culture positive for rotavirus continue lactobacillus. GI input appreciated. 2. Fevers- 2/2 above. T-max 102 at home. 100.2 °F on admission. CXR left-sided atelectasis. UA negative. Patient remains afebrile. 3. CKD- BUN/Crt 25/1.3. Appears to be at baseline. Did receive IVF hydration upon presentation. Has since been DC'd. Continue to follow closely avoid nephrotoxic agents. 4.  ?Partially obstructed right inguinal hernia- known hernia. Not causing any recent pain/discomfort. Continue to monitor. 5. 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 CT scan 4/7. Will need follow-up in 2 months. Continue to avoid anticoagulation/antiplatelet medications. Denies HA/weakness. Follow  6. HLD- continue atorvastatin  7. Hx iron deficiency anemia-   8. Hx aortic valve replacement(TAVR)  9. Deconditioning- PT/OT. From assisted living. Will return to DC.  10. Prediabetic- diet controlled. 1/2021 A1c 6.3. Random blood glucose 105. Currently on regular diet. Continue to follow POCT RN.  11. HTN- metoprolol tartrate. Controlled. Continue to follow adjust as needed. 12. Disposition- increase in diarrhea this morning. Rotavirus+. Hold DC for now. Await specialty plan. Family would like patient to receive PT. [address] is able to accept patient. Precertification initiated. Case management following. NOTE: This report was transcribed using voice recognition software. Every effort was made to ensure accuracy; however, inadvertent computerized transcription errors may be present. Electronically signed by Daphney Choi CNP on 5/5/2022 at 12:05 PM

## 2022-05-06 VITALS
HEART RATE: 68 BPM | BODY MASS INDEX: 21.94 KG/M2 | WEIGHT: 156.75 LBS | TEMPERATURE: 98.3 F | DIASTOLIC BLOOD PRESSURE: 68 MMHG | RESPIRATION RATE: 16 BRPM | HEIGHT: 71 IN | OXYGEN SATURATION: 96 % | SYSTOLIC BLOOD PRESSURE: 139 MMHG

## 2022-05-06 LAB
CULTURE, STOOL: NORMAL
HBA1C MFR BLD: 5.8 % (ref 4–5.6)
ORGANISM: ABNORMAL
SARS-COV-2, NAAT: NOT DETECTED
URINE CULTURE, ROUTINE: ABNORMAL

## 2022-05-06 PROCEDURE — G0378 HOSPITAL OBSERVATION PER HR: HCPCS

## 2022-05-06 PROCEDURE — 36415 COLL VENOUS BLD VENIPUNCTURE: CPT

## 2022-05-06 PROCEDURE — 83036 HEMOGLOBIN GLYCOSYLATED A1C: CPT

## 2022-05-06 PROCEDURE — 2580000003 HC RX 258: Performed by: NURSE PRACTITIONER

## 2022-05-06 PROCEDURE — 6370000000 HC RX 637 (ALT 250 FOR IP): Performed by: FAMILY MEDICINE

## 2022-05-06 PROCEDURE — 87635 SARS-COV-2 COVID-19 AMP PRB: CPT

## 2022-05-06 PROCEDURE — APPSS45 APP SPLIT SHARED TIME 31-45 MINUTES: Performed by: NURSE PRACTITIONER

## 2022-05-06 PROCEDURE — 6370000000 HC RX 637 (ALT 250 FOR IP): Performed by: NURSE PRACTITIONER

## 2022-05-06 PROCEDURE — 99217 PR OBSERVATION CARE DISCHARGE MANAGEMENT: CPT | Performed by: FAMILY MEDICINE

## 2022-05-06 RX ADMIN — METOPROLOL TARTRATE 25 MG: 25 TABLET, FILM COATED ORAL at 08:52

## 2022-05-06 RX ADMIN — BRIMONIDINE TARTRATE 1 DROP: 2 SOLUTION OPHTHALMIC at 08:51

## 2022-05-06 RX ADMIN — SODIUM CHLORIDE, PRESERVATIVE FREE 5 ML: 5 INJECTION INTRAVENOUS at 08:55

## 2022-05-06 RX ADMIN — ATORVASTATIN CALCIUM 40 MG: 40 TABLET, FILM COATED ORAL at 08:51

## 2022-05-06 RX ADMIN — TIMOLOL MALEATE 1 DROP: 5 SOLUTION OPHTHALMIC at 08:51

## 2022-05-06 RX ADMIN — FAMOTIDINE 20 MG: 20 TABLET ORAL at 08:51

## 2022-05-06 RX ADMIN — Medication 1 CAPSULE: at 08:52

## 2022-05-06 ASSESSMENT — PAIN SCALES - GENERAL: PAINLEVEL_OUTOF10: 0

## 2022-05-06 ASSESSMENT — PAIN SCALES - WONG BAKER: WONGBAKER_NUMERICALRESPONSE: 0

## 2022-05-06 NOTE — CARE COORDINATION
Per melinda pt approved; to transport 1:30 pm via PAS; forms on chart. N-N [id number]; facility, pt, son Iris Chisholm linnette. covid pended. Ivory Alvarez.

## 2022-05-06 NOTE — DISCHARGE SUMMARY
[facility]  Hospitalist       Hospitalist Physician Discharge Summary        [address] [address]  [facility] Physicians Pre-Service  [phone]  Schedule an appointment as soon as possible for a visit in 1 week  Please call for follow up post hospital stay appt. Activity level: As tolerated    Diet: ADULT DIET; Regular; High Fiber    Condition at discharge: Stable    Dispo: [facility]    Patient ID:  Louise Fisher  [id number]  20 y.o.  1/16/1930    Admit date: 5/3/2022    Discharge date and time:  5/6/2022  12:58 PM    Admission Diagnoses: Principal Problem:    Gastroenteritis  Active Problems:    Rotavirus enteritis  Resolved Problems:    * No resolved hospital problems. *      Discharge Diagnoses: Principal Problem:    Gastroenteritis  Active Problems:    Rotavirus enteritis  Resolved Problems:    * No resolved hospital problems. *      Consults:  IP CONSULT TO SOCIAL WORK  IP CONSULT TO GI    Procedures: None    Hospital Course:   113/2022 80year-old male presented to [facility] ED with complaints of fever and diarrhea. Noted multiple episodes of watery diarrhea over the last 2 days prior to presentation. Fevers with T-max noted at 102 prior to presentation. Patient lives in assisted living. Several other residents are having the same issues. Gastroenteritis viral versus infectious? CT A/P revealed enterocolitis. Received ceftriaxone/Flagyl in ED course. Rapid flu and COVID-19 negative. Decision to admit patient for further evaluation and treatment. 1. Diarrhea- 2/2 rotavirus. CT A/P enterocolitis. Received ceftriaxone/Flagyl in ED course. Rapid flu/COVID-19 negative. Received IVF hydration in ED. C. difficile negative. Stool culture positive for rotavirus continue lactobacillus. GI input appreciated. 2. Fevers- 2/2 above. T-max 102 at home. 100.2 °F on admission. CXR left-sided atelectasis. UA negative.   Patient remains afebrile. 3. CKD- BUN/Crt 25/1.3. Appears to be at baseline. Did receive IVF hydration upon presentation. Has since been DC'd. Continue to follow closely avoid nephrotoxic agents. 4. ?Partially obstructed right inguinal hernia- known hernia. Not causing any recent pain/discomfort. Continue to monitor. 5. 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 CT scan 4/7. Will need follow-up in 2 months. Continue to avoid anticoagulation/antiplatelet medications. Denies HA/weakness. Follow  6. HLD- continue atorvastatin  7. Hx iron deficiency anemia-   8. Hx aortic valve replacement(TAVR)  9. Deconditioning- PT/OT. From assisted living. Will return to [place].  10. Prediabetic- diet controlled. 1/2021 A1c 6.3. Random blood glucose 105. Currently on regular diet. Continue to follow POCT RN.  11. HTN- metoprolol tartrate. Controlled. Continue to follow adjust as needed. Patient is hemodynamically stable and can be discharged at this time. Patient will be instructed to follow-up with PCP upon DC. Patient and family requesting [facility] placement for rehabilitation prior to returning to assisted living. Patient had been accepted to [address] [place] [address] Drive place. Transportation has been arranged for 130 this afternoon.   Patient will be transported at that time    Discharge Exam:  Vitals:    05/05/22 0740 05/05/22 2000 05/06/22 0600 05/06/22 0715   BP: 128/72 (!) 154/70  139/68   Pulse: 75 93  68   Resp: 18 18  16   Temp: 98.2 °F (36.8 °C) 98.6 °F (37 °C)  98.3 °F (36.8 °C)   TempSrc: Oral Oral  Oral   SpO2: 93% 97%  96%   Weight:   156 lb 12 oz (71.1 kg)    Height:         General Appearance: alert and oriented to person, place and time and in no acute distress  Skin: warm and dry  Head: normocephalic and atraumatic  Eyes: pupils equal, round, and reactive to light, extraocular eye movements intact, conjunctivae normal  Neck: neck supple and non tender without mass   Pulmonary/Chest: clear to auscultation bilaterally- no wheezes, rales or rhonchi, normal air movement, no respiratory distress  Cardiovascular: normal rate, normal S1 and S2 and no RGM  Abdomen: soft, non-tender, non-distended, normal bowel sounds  Extremities: no cyanosis, no clubbing and no edema  Neurologic: no cranial nerve deficit and speech normal    No intake/output data recorded. I/O this shift: In: 5 [I.V.:5]  Out: -       LABS:  Recent Labs     05/03/22  1406 05/04/22  0540 05/05/22  1315    135 133   K 4.4 4.3 3.8    103 101   CO2 22 21* 20*   BUN 26* 25* 29*   CREATININE 1.3* 1.3* 1.4*   GLUCOSE 105* 85 107*   CALCIUM 9.3 8.6 8.6       Recent Labs     05/03/22  1406 05/04/22  0540   WBC 9.2 9.1   RBC 4.14 3.72*   HGB 12.9 11.4*   HCT 39.8 35.6*   MCV 96.1 95.7   MCH 31.2 30.6   MCHC 32.4 32.0   RDW 14.3 14.6   * 132   MPV 11.5 10.8       No results for input(s): POCGLU in the last 72 hours. Imaging:  CT ABDOMEN PELVIS W IV CONTRAST Additional Contrast? None    Result Date: 5/3/2022  EXAMINATION: CT OF THE ABDOMEN AND PELVIS WITH CONTRAST 5/3/2022 3:35 pm TECHNIQUE: CT of the abdomen and pelvis was performed with the administration of intravenous contrast. Multiplanar reformatted images are provided for review. Dose modulation, iterative reconstruction, and/or weight based adjustment of the mA/kV was utilized to reduce the radiation dose to as low as reasonably achievable. COMPARISON: None. HISTORY: ORDERING SYSTEM PROVIDED HISTORY: fever diarrhea TECHNOLOGIST PROVIDED HISTORY: Additional Contrast?->None Reason for exam:->fever diarrhea Decision Support Exception - unselect if not a suspected or confirmed emergency medical condition->Emergency Medical Condition (MA) FINDINGS: There is generalized fluid distension but not dilatation of the small bowel, and in the colon but predominant on the right-side/transverse colon. Some fluid contents seen in the rectum.  There are discrete diffuse a enhancement of the bowel wall of the small bowel with discrete thickening of the mucosal folds. Combination of the findings can be manifestation of a enterocolitis pattern of predominant enteritis component. There is a partially obstructing right inguinal hernia with the small bowel contents. At the neck of the hernia there is a narrowed cross-sectional diameter for the afferent and efferent loops. Small fluid contents seen in the hernia sac. The right inguinal hernia set measures 8.6 cm x 4.3 cm x 3 cm. The opening/neck of the hernia is 3.2 x 2 cm. There is no free intraperitoneal air. There is no conspicuous acute inflammatory changes in the omental/mesenteric fat planes. There is no pericolonic inflammatory process. The ileocecal valve has unremarkable appearance. The appendix could not be conspicuously identified. Uncomplicated the diverticulosis of moderate degree is seen in the left-sided colon. There is no signs for acute diverticulitis. There is normal size enhancement the for the liver. There is some indication for fat replacement of the liver parenchyma. No conspicuous focal lesions were seen during late arterial phase. The gallbladder is well distended, it has unremarkable appearance. The biliary tree is not dilated. Pancreatic duct system is not dilated. There is atrophy of the pancreas. The spleen appear unremarkable. There is a small hiatal hernia. Adrenals not enlarged. Kidneys are preserved size, cortical thickness, parenchymal enhancement. There is no renal calculus or obstruction. The small cyst is seen in the lower pole of the right kidney. The bladder is well distended. The bladder measures 12 x 9.7 x 10.5 cm. A component of urinary bladder retention could be considered. Please correlate clinically. The prostate gland measures 4.7 x 3.5 by 4.1 cm, impresses and elevates the base of the bladder. Seminal vesicles have unremarkable appearance.  Extensive postoperative changes are seen in the lumbar spine with multiple levels of a spinal fusion including bone and metallic hardware fusion with intrapedicular screws and multiple levels of laminectomy. These are old longstanding findings. Some areas of focal sclerosis seen in the subchondral region of the left femoral head located anteriorly without the depression of the subchondral bone can be manifestation of previous aseptic necrosis without collapse of the subchondral bone. Demonstrate the areas of residual scarring/chronic appearing linear subsegmental atelectasis in both bases from more central bronchocentric areas towards the periphery of the lung in some areas with a reticular component as well as seen posterior and inferior in the lower lobes. These are more likely chronic findings. Findings of enterocolitis, with predominant enteritis component. Partially obstructing right inguinal hernia to be correlate initially with clinical examination to the terminate if the area is reducible or not. If not reducible surgical consultation indicated for the hernia. Distended the bladder at the time the present study. Cannot exclude urinary bladder retention. Please correlate clinically. RECOMMENDATIONS: Unavailable     XR CHEST PORTABLE    Result Date: 5/3/2022  EXAMINATION: ONE XRAY VIEW OF THE CHEST 5/3/2022 12:51 pm COMPARISON: 11 March 2022 HISTORY: ORDERING SYSTEM PROVIDED HISTORY: fever TECHNOLOGIST PROVIDED HISTORY: Reason for exam:->fever FINDINGS: Kyphotic positioning. Opacity at the left base is likely atelectasis. Heart and pulmonary vascularity are normal.  Neither costophrenic angle is blunted. Suspected atelectasis at the left base.          Patient Instructions:   Current Discharge Medication List      START taking these medications    Details   lactobacillus (CULTURELLE) CAPS capsule Take 1 capsule by mouth daily  Qty: 30 capsule, Refills: 0      bismuth subsalicylate (BISMATROL) 262 MG/15ML suspension Take 30 mLs by mouth 4 times daily as needed for Indigestion  Refills: 0         CONTINUE these medications which have CHANGED    Details   loperamide (IMODIUM) 2 MG capsule Take 1 capsule by mouth 4 times daily as needed for Diarrhea  Refills: 0         CONTINUE these medications which have NOT CHANGED    Details   HYDROcodone-acetaminophen (NORCO) 5-325 MG per tablet Take 1 tablet by mouth every 12 hours as needed for Pain.      loratadine (CLARITIN) 10 MG tablet Take 10 mg by mouth daily as needed (allergies)      brimonidine-timolol (COMBIGAN) 0.2-0.5 % ophthalmic solution Place 1 drop into both eyes every 12 hours      Cholecalciferol (VITAMIN D3) 50 MCG (2000 UT) CAPS Take 1 capsule by mouth daily      famotidine (PEPCID) 20 MG tablet Take 20 mg by mouth daily 3 pm      vitamin B-12 (CYANOCOBALAMIN) 1000 MCG tablet Take 1,000 mcg by mouth daily      vitamin B-6 (PYRIDOXINE) 100 MG tablet Take 100 mg by mouth daily      Folic Acid 0.8 MG CAPS Take 1 capsule by mouth daily      metoprolol tartrate (LOPRESSOR) 25 MG tablet Take 25 mg by mouth 2 times daily      atorvastatin (LIPITOR) 40 MG tablet Take 40 mg by mouth daily      Travoprost, BAK Free, (TRAVATAN Z) 0.004 % SOLN ophthalmic solution Place 1 drop into both eyes nightly      dimethicone-zinc oxide (DARIUS PROTECT) cream Apply topically daily as needed for Dry Skin         STOP taking these medications       naproxen (NAPROSYN) 500 MG tablet Comments:   Reason for Stopping:         levETIRAcetam (KEPPRA) 500 MG tablet Comments:   Reason for Stopping:         Alum & Mag Hydroxide-Simeth (MYLANTA PO) Comments:   Reason for Stopping:         Dextromethorphan-guaiFENesin (TUSSIN DM PO) Comments:   Reason for Stopping:         magnesium hydroxide (MILK OF MAGNESIA) 400 MG/5ML suspension Comments:   Reason for Stopping:         bisacodyl (DULCOLAX) 5 MG EC tablet Comments:   Reason for Stopping:         acetaminophen (TYLENOL) 325 MG tablet Comments:   Reason for Stopping:                 Note that

## 2022-05-06 NOTE — CARE COORDINATION
Social work / Discharge Planning:       Patient is now observation status. PASRR completed.    Electronically signed by KIMI Arnold on 5/6/2022 at 10:26 AM

## 2022-05-06 NOTE — PLAN OF CARE
Discharge orders noted. Follow-up in 2 to 3 weeks after discharge -patient/family to call for appointment and with questions/concerns at [phone].   Thank you for the opportunity to participate in the care of Mr Marcello Alfaro MD

## 2022-05-07 LAB
FECAL NEUTRAL FAT: NORMAL
FECAL SPLIT FATS: NORMAL

## 2022-05-08 LAB
BLOOD CULTURE, ROUTINE: NORMAL
CALPROTECTIN, FECAL: 133 UG/G
CULTURE, BLOOD 2: NORMAL

## 2022-07-09 ENCOUNTER — APPOINTMENT (OUTPATIENT)
Dept: GENERAL RADIOLOGY | Age: 87
DRG: 280 | End: 2022-07-09
Payer: MEDICARE

## 2022-07-09 ENCOUNTER — HOSPITAL ENCOUNTER (INPATIENT)
Age: 87
LOS: 5 days | Discharge: NURSING, REHAB OR LONG-TERM CARE | DRG: 280 | End: 2022-07-13
Attending: EMERGENCY MEDICINE | Admitting: INTERNAL MEDICINE
Payer: MEDICARE

## 2022-07-09 DIAGNOSIS — R55 NEAR SYNCOPE: ICD-10-CM

## 2022-07-09 DIAGNOSIS — M54.50 CHRONIC LOW BACK PAIN WITHOUT SCIATICA, UNSPECIFIED BACK PAIN LATERALITY: ICD-10-CM

## 2022-07-09 DIAGNOSIS — G89.29 CHRONIC LOW BACK PAIN WITHOUT SCIATICA, UNSPECIFIED BACK PAIN LATERALITY: ICD-10-CM

## 2022-07-09 DIAGNOSIS — I21.4 NSTEMI (NON-ST ELEVATED MYOCARDIAL INFARCTION) (HCC): Primary | ICD-10-CM

## 2022-07-09 LAB
ANION GAP SERPL CALCULATED.3IONS-SCNC: 16 MMOL/L (ref 7–16)
BACTERIA: ABNORMAL /HPF
BASOPHILS ABSOLUTE: 0.05 E9/L (ref 0–0.2)
BASOPHILS RELATIVE PERCENT: 0.5 % (ref 0–2)
BILIRUBIN URINE: NEGATIVE
BLOOD, URINE: NEGATIVE
BUN BLDV-MCNC: 25 MG/DL (ref 6–23)
CALCIUM SERPL-MCNC: 8.8 MG/DL (ref 8.6–10.2)
CHLORIDE BLD-SCNC: 98 MMOL/L (ref 98–107)
CLARITY: CLEAR
CO2: 18 MMOL/L (ref 22–29)
COLOR: YELLOW
CREAT SERPL-MCNC: 1.3 MG/DL (ref 0.7–1.2)
EOSINOPHILS ABSOLUTE: 0.14 E9/L (ref 0.05–0.5)
EOSINOPHILS RELATIVE PERCENT: 1.3 % (ref 0–6)
GFR AFRICAN AMERICAN: >60
GFR NON-AFRICAN AMERICAN: 52 ML/MIN/1.73
GLUCOSE BLD-MCNC: 138 MG/DL (ref 74–99)
GLUCOSE URINE: NEGATIVE MG/DL
HCT VFR BLD CALC: 34.1 % (ref 37–54)
HEMOGLOBIN: 11.3 G/DL (ref 12.5–16.5)
IMMATURE GRANULOCYTES #: 0.08 E9/L
IMMATURE GRANULOCYTES %: 0.7 % (ref 0–5)
KETONES, URINE: NEGATIVE MG/DL
LACTIC ACID: 2 MMOL/L (ref 0.5–2.2)
LEUKOCYTE ESTERASE, URINE: NEGATIVE
LYMPHOCYTES ABSOLUTE: 1.27 E9/L (ref 1.5–4)
LYMPHOCYTES RELATIVE PERCENT: 11.6 % (ref 20–42)
MAGNESIUM: 1.9 MG/DL (ref 1.6–2.6)
MCH RBC QN AUTO: 31.4 PG (ref 26–35)
MCHC RBC AUTO-ENTMCNC: 33.1 % (ref 32–34.5)
MCV RBC AUTO: 94.7 FL (ref 80–99.9)
MONOCYTES ABSOLUTE: 1.55 E9/L (ref 0.1–0.95)
MONOCYTES RELATIVE PERCENT: 14.2 % (ref 2–12)
NEUTROPHILS ABSOLUTE: 7.84 E9/L (ref 1.8–7.3)
NEUTROPHILS RELATIVE PERCENT: 71.7 % (ref 43–80)
NITRITE, URINE: NEGATIVE
PDW BLD-RTO: 15.7 FL (ref 11.5–15)
PH UA: 6 (ref 5–9)
PLATELET # BLD: 141 E9/L (ref 130–450)
PMV BLD AUTO: 10.4 FL (ref 7–12)
POTASSIUM SERPL-SCNC: 4.3 MMOL/L (ref 3.5–5)
PROTEIN UA: 100 MG/DL
RBC # BLD: 3.6 E12/L (ref 3.8–5.8)
RBC # BLD: NORMAL 10*6/UL
RBC UA: ABNORMAL /HPF (ref 0–2)
SARS-COV-2, NAAT: NOT DETECTED
SODIUM BLD-SCNC: 132 MMOL/L (ref 132–146)
SPECIFIC GRAVITY UA: 1.02 (ref 1–1.03)
TROPONIN, HIGH SENSITIVITY: 163 NG/L (ref 0–11)
UROBILINOGEN, URINE: 0.2 E.U./DL
WBC # BLD: 10.9 E9/L (ref 4.5–11.5)
WBC UA: ABNORMAL /HPF (ref 0–5)

## 2022-07-09 PROCEDURE — 36415 COLL VENOUS BLD VENIPUNCTURE: CPT

## 2022-07-09 PROCEDURE — 87635 SARS-COV-2 COVID-19 AMP PRB: CPT

## 2022-07-09 PROCEDURE — 80048 BASIC METABOLIC PNL TOTAL CA: CPT

## 2022-07-09 PROCEDURE — 93005 ELECTROCARDIOGRAM TRACING: CPT | Performed by: EMERGENCY MEDICINE

## 2022-07-09 PROCEDURE — 2580000003 HC RX 258: Performed by: EMERGENCY MEDICINE

## 2022-07-09 PROCEDURE — 81001 URINALYSIS AUTO W/SCOPE: CPT

## 2022-07-09 PROCEDURE — 87088 URINE BACTERIA CULTURE: CPT

## 2022-07-09 PROCEDURE — 83735 ASSAY OF MAGNESIUM: CPT

## 2022-07-09 PROCEDURE — 99285 EMERGENCY DEPT VISIT HI MDM: CPT

## 2022-07-09 PROCEDURE — 83605 ASSAY OF LACTIC ACID: CPT

## 2022-07-09 PROCEDURE — 71045 X-RAY EXAM CHEST 1 VIEW: CPT

## 2022-07-09 PROCEDURE — 84484 ASSAY OF TROPONIN QUANT: CPT

## 2022-07-09 PROCEDURE — 85025 COMPLETE CBC W/AUTO DIFF WBC: CPT

## 2022-07-09 RX ORDER — 0.9 % SODIUM CHLORIDE 0.9 %
500 INTRAVENOUS SOLUTION INTRAVENOUS ONCE
Status: COMPLETED | OUTPATIENT
Start: 2022-07-09 | End: 2022-07-09

## 2022-07-09 RX ADMIN — SODIUM CHLORIDE 500 ML: 9 INJECTION, SOLUTION INTRAVENOUS at 19:04

## 2022-07-09 ASSESSMENT — ENCOUNTER SYMPTOMS
BACK PAIN: 0
SORE THROAT: 0
EYE PAIN: 0
EYE REDNESS: 0
DIARRHEA: 0
ABDOMINAL PAIN: 0
WHEEZING: 0
SINUS PAIN: 0
VOMITING: 0
COUGH: 0
NAUSEA: 0
SHORTNESS OF BREATH: 1

## 2022-07-09 NOTE — ED TRIAGE NOTES
Patient from a nursing facility with complaints of a brief episode of SOB. Symptoms have resolved prior to arrival to ER.

## 2022-07-09 NOTE — ED PROVIDER NOTES
No chief complaint on file. 80-year-old male with past medical history of CAD, hyperlipidemia, hypertension presenting today after a near syncopal episode. He resides in a nursing facility, he said that he has been constipated and requested prune juice today. He had a bowel movement this afternoon and was washing his hands when he began to feel lightheaded and slid down against the wall. This is never happened before, he did not lose consciousness, did not hit his head, it was witnessed. Nothing is hurting him. He says he has been feeling as well as legs have been giving out frequently and that is what happened today as well. He was not straining at the time it happened. No other acute complaints. Review of Systems   Constitutional: Negative for chills and fever. HENT: Negative for ear pain, sinus pain and sore throat. Eyes: Negative for pain and redness. Respiratory: Positive for shortness of breath. Negative for cough and wheezing. Cardiovascular: Negative for chest pain. Gastrointestinal: Negative for abdominal pain, diarrhea, nausea and vomiting. Genitourinary: Negative for dysuria and flank pain. Musculoskeletal: Negative for back pain and neck pain. Skin: Negative for rash. Neurological: Positive for syncope. Negative for seizures and headaches. Hematological: Negative for adenopathy. All other systems reviewed and are negative. Physical Exam  Vitals and nursing note reviewed. Constitutional:       General: He is not in acute distress. Appearance: He is well-developed. He is not ill-appearing. HENT:      Head: Normocephalic and atraumatic. Right Ear: External ear normal.      Left Ear: External ear normal.      Mouth/Throat:      Mouth: Mucous membranes are moist.      Pharynx: Oropharynx is clear. Eyes:      Pupils: Pupils are equal, round, and reactive to light. Cardiovascular:      Rate and Rhythm: Normal rate and regular rhythm.       Heart sounds: Normal heart sounds. No murmur heard. Pulmonary:      Effort: Pulmonary effort is normal. No tachypnea or accessory muscle usage. Breath sounds: Normal breath sounds. No decreased breath sounds. Chest:      Chest wall: No mass. Abdominal:      General: Bowel sounds are normal.      Palpations: Abdomen is soft. Tenderness: There is no abdominal tenderness. There is no guarding or rebound. Musculoskeletal:         General: No tenderness. Cervical back: Normal range of motion and neck supple. Right lower leg: No edema. Left lower leg: No edema. Skin:     General: Skin is warm and dry. Coloration: Skin is not pale. Findings: No erythema. Neurological:      General: No focal deficit present. Mental Status: He is alert and oriented to person, place, and time. Procedures     EKG: This EKG is signed and interpreted by me. Rate: 87  Rhythm: Sinus and occasional PVCs  Interpretation: no acute changes, no celebrations, reveals normal,   Comparison: stable as compared to patient's most recent EKG      MDM  Number of Diagnoses or Management Options  Near syncope  NSTEMI (non-ST elevated myocardial infarction) [facility])  Diagnosis management comments: 25-year-old male with past medical history of CAD, hyperlipidemia, hypertension presents after syncopal episode. On arrival to emergency room he is hemodynamically stable. Chest x-ray and EKG were not concerning for an acute pathology, high-sensitivity troponin increasing 163 to 233. Repeat troponin, repeat EKG, heparin infusion were ordered. Repeat troponin resulted at 375. Patient is still not experiencing chest pain and is otherwise asymptomatic. Discussed the case with Dr. Iris Concepcion and he will admit the patient for cardiac evaluation and observation. Repeat EKG is still pending. ED Course as of 07/10/22 0237   Rosie Smith Jul 10, 2022   0127 Will send third troponin and repeat EKG.  [MM]   0143 Spoke to hospitalist and he will accept the patient. [MM]      ED Course User Index  [MM] aAron Correa DO        ED Course as of 07/10/22 7946   Todd  Jul 10, 2022   0127 Will send third troponin and repeat EKG. [MM]   0148 Spoke to hospitalist and he will accept the patient. [MM]      ED Course User Index  [MM] Aaron Correa DO       --------------------------------------------- PAST HISTORY ---------------------------------------------  Past Medical History:  has a past medical history of CKD (chronic kidney disease), Fall, Hyperlipidemia, Hypertension, Iron deficiency anemia, Subdural hematoma ([address] and Vitamin D deficiency. Past Surgical History:  has a past surgical history that includes Coronary artery bypass graft and back surgery. Social History:  reports that he has never smoked. He has never used smokeless tobacco. He reports current alcohol use. He reports that he does not use drugs. Family History: family history is not on file. The patients home medications have been reviewed. Allergies: Patient has no known allergies.     -------------------------------------------------- RESULTS -------------------------------------------------    LABS:  Results for orders placed or performed during the hospital encounter of 07/09/22   COVID-19, Rapid    Specimen: Nasopharyngeal Swab   Result Value Ref Range    SARS-CoV-2, NAAT Not Detected Not Detected   CBC with Auto Differential   Result Value Ref Range    WBC 10.9 4.5 - 11.5 E9/L    RBC 3.60 (L) 3.80 - 5.80 E12/L    Hemoglobin 11.3 (L) 12.5 - 16.5 g/dL    Hematocrit 34.1 (L) 37.0 - 54.0 %    MCV 94.7 80.0 - 99.9 fL    MCH 31.4 26.0 - 35.0 pg    MCHC 33.1 32.0 - 34.5 %    RDW 15.7 (H) 11.5 - 15.0 fL    Platelets 749 879 - 545 E9/L    MPV 10.4 7.0 - 12.0 fL    Neutrophils % 71.7 43.0 - 80.0 %    Immature Granulocytes % 0.7 0.0 - 5.0 %    Lymphocytes % 11.6 (L) 20.0 - 42.0 %    Monocytes % 14.2 (H) 2.0 - 12.0 %    Eosinophils % 1.3 0.0 - 6.0 % Basophils % 0.5 0.0 - 2.0 %    Neutrophils Absolute 7.84 (H) 1.80 - 7.30 E9/L    Immature Granulocytes # 0.08 E9/L    Lymphocytes Absolute 1.27 (L) 1.50 - 4.00 E9/L    Monocytes Absolute 1.55 (H) 0.10 - 0.95 E9/L    Eosinophils Absolute 0.14 0.05 - 0.50 E9/L    Basophils Absolute 0.05 0.00 - 0.20 E9/L    RBC Morphology Normal    Troponin   Result Value Ref Range    Troponin, High Sensitivity 163 (H) 0 - 11 ng/L   Urinalysis with Microscopic   Result Value Ref Range    Color, UA Yellow Straw/Yellow    Clarity, UA Clear Clear    Glucose, Ur Negative Negative mg/dL    Bilirubin Urine Negative Negative    Ketones, Urine Negative Negative mg/dL    Specific Gravity, UA 1.025 1.005 - 1.030    Blood, Urine Negative Negative    pH, UA 6.0 5.0 - 9.0    Protein,  (A) Negative mg/dL    Urobilinogen, Urine 0.2 <2.0 E.U./dL    Nitrite, Urine Negative Negative    Leukocyte Esterase, Urine Negative Negative    WBC, UA NONE 0 - 5 /HPF    RBC, UA NONE 0 - 2 /HPF    Bacteria, UA NONE SEEN None Seen /HPF   Lactic Acid   Result Value Ref Range    Lactic Acid 2.0 0.5 - 2.2 mmol/L   Basic Metabolic Panel   Result Value Ref Range    Sodium 132 132 - 146 mmol/L    Potassium 4.3 3.5 - 5.0 mmol/L    Chloride 98 98 - 107 mmol/L    CO2 18 (L) 22 - 29 mmol/L    Anion Gap 16 7 - 16 mmol/L    Glucose 138 (H) 74 - 99 mg/dL    BUN 25 (H) 6 - 23 mg/dL    CREATININE 1.3 (H) 0.7 - 1.2 mg/dL    GFR Non-African American 52 >=60 mL/min/1.73    GFR African American >60     Calcium 8.8 8.6 - 10.2 mg/dL   Magnesium   Result Value Ref Range    Magnesium 1.9 1.6 - 2.6 mg/dL   Troponin   Result Value Ref Range    Troponin, High Sensitivity 233 (H) 0 - 11 ng/L   Troponin   Result Value Ref Range    Troponin, High Sensitivity 375 (H) 0 - 11 ng/L   APTT   Result Value Ref Range    aPTT 25.5 24.5 - 35.1 sec   EKG 12 Lead   Result Value Ref Range    Ventricular Rate 87 BPM    Atrial Rate 87 BPM    P-R Interval 170 ms    QRS Duration 84 ms    Q-T Interval 374 ms    QTc Calculation (Bazett) 450 ms    P Axis 26 degrees    R Axis -22 degrees    T Axis 49 degrees       RADIOLOGY:  XR CHEST PORTABLE   Final Result   No acute cardiopulmonary abnormality.             ------------------------- NURSING NOTES AND VITALS REVIEWED ---------------------------  Date / Time Roomed:  7/9/2022  7:14 PM  ED Bed Assignment:  15/15    The nursing notes within the ED encounter and vital signs as below have been reviewed. Patient Vitals for the past 24 hrs:   BP Temp Temp src Pulse Resp SpO2 Weight   07/10/22 0200 (!) 123/58 -- -- 77 -- -- --   07/10/22 0144 -- -- -- -- -- -- 161 lb (73 kg)   07/09/22 1958 (!) 97/53 98.5 °F (36.9 °C) Oral 93 14 95 % --       Oxygen Saturation Interpretation: Normal    ------------------------------------------ PROGRESS NOTES ------------------------------------------  Re-evaluation(s):  Time: 0220  Patients symptoms show no change  Repeat physical examination is not changed    Counseling:  I have spoken with the patient and discussed todays results, in addition to providing specific details for the plan of care and counseling regarding the diagnosis and prognosis. Their questions are answered at this time and they are agreeable with the plan of admission.    --------------------------------- ADDITIONAL PROVIDER NOTES ---------------------------------  Consultations:  Time: 0148. Spoke with Dr. Celeste Joiner. Discussed case. They will admit the patient. This patient's ED course included: a personal history and physicial examination, re-evaluation prior to disposition, multiple bedside re-evaluations, IV medications, cardiac monitoring, continuous pulse oximetry and complex medical decision making and emergency management    This patient has remained hemodynamically stable during their ED course. Diagnosis:  1. NSTEMI (non-ST elevated myocardial infarction) ([facility] [address]    2.  Near syncope        Disposition:  Patient's disposition: Admit to telemetry  Patient's condition is stable.          Tristin Yao,   Resident  07/10/22 1767

## 2022-07-09 NOTE — Clinical Note
Discharge Plan[de-identified] Other/[facility])   Telemetry/Cardiac Monitoring Required?: Yes

## 2022-07-10 PROBLEM — I21.4 NSTEMI (NON-ST ELEVATED MYOCARDIAL INFARCTION) (HCC): Status: ACTIVE | Noted: 2022-07-10

## 2022-07-10 LAB
ALBUMIN SERPL-MCNC: 3.3 G/DL (ref 3.5–5.2)
ALP BLD-CCNC: 113 U/L (ref 40–129)
ALT SERPL-CCNC: 30 U/L (ref 0–40)
ANION GAP SERPL CALCULATED.3IONS-SCNC: 12 MMOL/L (ref 7–16)
APTT: 25.5 SEC (ref 24.5–35.1)
APTT: 53.8 SEC (ref 24.5–35.1)
APTT: 63 SEC (ref 24.5–35.1)
AST SERPL-CCNC: 71 U/L (ref 0–39)
BILIRUB SERPL-MCNC: 0.6 MG/DL (ref 0–1.2)
BUN BLDV-MCNC: 21 MG/DL (ref 6–23)
CALCIUM SERPL-MCNC: 8.9 MG/DL (ref 8.6–10.2)
CHLORIDE BLD-SCNC: 104 MMOL/L (ref 98–107)
CO2: 21 MMOL/L (ref 22–29)
CREAT SERPL-MCNC: 1.1 MG/DL (ref 0.7–1.2)
EKG ATRIAL RATE: 87 BPM
EKG P AXIS: 26 DEGREES
EKG P-R INTERVAL: 170 MS
EKG Q-T INTERVAL: 374 MS
EKG QRS DURATION: 84 MS
EKG QTC CALCULATION (BAZETT): 450 MS
EKG R AXIS: -22 DEGREES
EKG T AXIS: 49 DEGREES
EKG VENTRICULAR RATE: 87 BPM
GFR AFRICAN AMERICAN: >60
GFR NON-AFRICAN AMERICAN: >60 ML/MIN/1.73
GLUCOSE BLD-MCNC: 116 MG/DL (ref 74–99)
HCT VFR BLD CALC: 35.3 % (ref 37–54)
HEMOGLOBIN: 11.6 G/DL (ref 12.5–16.5)
LV EF: 48 %
LVEF MODALITY: NORMAL
MAGNESIUM: 1.9 MG/DL (ref 1.6–2.6)
MCH RBC QN AUTO: 31.1 PG (ref 26–35)
MCHC RBC AUTO-ENTMCNC: 32.9 % (ref 32–34.5)
MCV RBC AUTO: 94.6 FL (ref 80–99.9)
PDW BLD-RTO: 15.8 FL (ref 11.5–15)
PLATELET # BLD: 145 E9/L (ref 130–450)
PMV BLD AUTO: 10.8 FL (ref 7–12)
POTASSIUM REFLEX MAGNESIUM: 4.1 MMOL/L (ref 3.5–5)
PRO-BNP: 5794 PG/ML (ref 0–450)
RBC # BLD: 3.73 E12/L (ref 3.8–5.8)
SODIUM BLD-SCNC: 137 MMOL/L (ref 132–146)
TOTAL PROTEIN: 6.1 G/DL (ref 6.4–8.3)
TROPONIN, HIGH SENSITIVITY: 233 NG/L (ref 0–11)
TROPONIN, HIGH SENSITIVITY: 375 NG/L (ref 0–11)
TROPONIN, HIGH SENSITIVITY: 420 NG/L (ref 0–11)
WBC # BLD: 9 E9/L (ref 4.5–11.5)

## 2022-07-10 PROCEDURE — 83880 ASSAY OF NATRIURETIC PEPTIDE: CPT

## 2022-07-10 PROCEDURE — 2580000003 HC RX 258: Performed by: EMERGENCY MEDICINE

## 2022-07-10 PROCEDURE — 93306 TTE W/DOPPLER COMPLETE: CPT

## 2022-07-10 PROCEDURE — 36415 COLL VENOUS BLD VENIPUNCTURE: CPT

## 2022-07-10 PROCEDURE — 6360000002 HC RX W HCPCS: Performed by: EMERGENCY MEDICINE

## 2022-07-10 PROCEDURE — 99223 1ST HOSP IP/OBS HIGH 75: CPT | Performed by: INTERNAL MEDICINE

## 2022-07-10 PROCEDURE — 83735 ASSAY OF MAGNESIUM: CPT

## 2022-07-10 PROCEDURE — 6370000000 HC RX 637 (ALT 250 FOR IP): Performed by: NURSE PRACTITIONER

## 2022-07-10 PROCEDURE — 85730 THROMBOPLASTIN TIME PARTIAL: CPT

## 2022-07-10 PROCEDURE — 6370000000 HC RX 637 (ALT 250 FOR IP): Performed by: INTERNAL MEDICINE

## 2022-07-10 PROCEDURE — 2060000000 HC ICU INTERMEDIATE R&B

## 2022-07-10 PROCEDURE — APPSS45 APP SPLIT SHARED TIME 31-45 MINUTES: Performed by: NURSE PRACTITIONER

## 2022-07-10 PROCEDURE — 2580000003 HC RX 258: Performed by: NURSE PRACTITIONER

## 2022-07-10 PROCEDURE — 84484 ASSAY OF TROPONIN QUANT: CPT

## 2022-07-10 PROCEDURE — 85027 COMPLETE CBC AUTOMATED: CPT

## 2022-07-10 PROCEDURE — 80053 COMPREHEN METABOLIC PANEL: CPT

## 2022-07-10 RX ORDER — ATORVASTATIN CALCIUM 40 MG/1
40 TABLET, FILM COATED ORAL DAILY
Status: DISCONTINUED | OUTPATIENT
Start: 2022-07-10 | End: 2022-07-10

## 2022-07-10 RX ORDER — LANOLIN ALCOHOL/MO/W.PET/CERES
1000 CREAM (GRAM) TOPICAL DAILY
Status: DISCONTINUED | OUTPATIENT
Start: 2022-07-10 | End: 2022-07-13 | Stop reason: HOSPADM

## 2022-07-10 RX ORDER — WITCH HAZEL 50 %
1 PADS, MEDICATED (EA) TOPICAL EVERY MORNING
COMMUNITY

## 2022-07-10 RX ORDER — SODIUM CHLORIDE 0.9 % (FLUSH) 0.9 %
5-40 SYRINGE (ML) INJECTION PRN
Status: DISCONTINUED | OUTPATIENT
Start: 2022-07-10 | End: 2022-07-13 | Stop reason: HOSPADM

## 2022-07-10 RX ORDER — HEPARIN SODIUM 1000 [USP'U]/ML
60 INJECTION, SOLUTION INTRAVENOUS; SUBCUTANEOUS ONCE
Status: COMPLETED | OUTPATIENT
Start: 2022-07-10 | End: 2022-07-10

## 2022-07-10 RX ORDER — ATORVASTATIN CALCIUM 20 MG/1
20 TABLET, FILM COATED ORAL NIGHTLY
Status: DISCONTINUED | OUTPATIENT
Start: 2022-07-10 | End: 2022-07-13 | Stop reason: HOSPADM

## 2022-07-10 RX ORDER — ACETAMINOPHEN 650 MG/1
650 SUPPOSITORY RECTAL EVERY 6 HOURS PRN
Status: DISCONTINUED | OUTPATIENT
Start: 2022-07-10 | End: 2022-07-13 | Stop reason: HOSPADM

## 2022-07-10 RX ORDER — LACTOBACILLUS RHAMNOSUS GG 10B CELL
1 CAPSULE ORAL DAILY
Status: DISCONTINUED | OUTPATIENT
Start: 2022-07-10 | End: 2022-07-13 | Stop reason: HOSPADM

## 2022-07-10 RX ORDER — CHOLECALCIFEROL (VITAMIN D3) 50 MCG
2000 TABLET ORAL DAILY
Status: DISCONTINUED | OUTPATIENT
Start: 2022-07-10 | End: 2022-07-13 | Stop reason: HOSPADM

## 2022-07-10 RX ORDER — ACETAMINOPHEN 325 MG/1
650 TABLET ORAL EVERY 6 HOURS PRN
Status: DISCONTINUED | OUTPATIENT
Start: 2022-07-10 | End: 2022-07-13 | Stop reason: HOSPADM

## 2022-07-10 RX ORDER — POLYETHYLENE GLYCOL 3350 17 G/17G
17 POWDER, FOR SOLUTION ORAL DAILY PRN
Status: DISCONTINUED | OUTPATIENT
Start: 2022-07-10 | End: 2022-07-13 | Stop reason: HOSPADM

## 2022-07-10 RX ORDER — SODIUM CHLORIDE 9 MG/ML
INJECTION, SOLUTION INTRAVENOUS PRN
Status: DISCONTINUED | OUTPATIENT
Start: 2022-07-10 | End: 2022-07-13 | Stop reason: HOSPADM

## 2022-07-10 RX ORDER — BRIMONIDINE TARTRATE 2 MG/ML
1 SOLUTION/ DROPS OPHTHALMIC 2 TIMES DAILY
Status: DISCONTINUED | OUTPATIENT
Start: 2022-07-10 | End: 2022-07-13 | Stop reason: HOSPADM

## 2022-07-10 RX ORDER — MULTIVITAMIN WITH IRON
100 TABLET ORAL DAILY
Status: DISCONTINUED | OUTPATIENT
Start: 2022-07-10 | End: 2022-07-13 | Stop reason: HOSPADM

## 2022-07-10 RX ORDER — HEPARIN SODIUM 1000 [USP'U]/ML
30 INJECTION, SOLUTION INTRAVENOUS; SUBCUTANEOUS PRN
Status: DISCONTINUED | OUTPATIENT
Start: 2022-07-10 | End: 2022-07-13 | Stop reason: ALTCHOICE

## 2022-07-10 RX ORDER — HEPARIN SODIUM 1000 [USP'U]/ML
60 INJECTION, SOLUTION INTRAVENOUS; SUBCUTANEOUS PRN
Status: DISCONTINUED | OUTPATIENT
Start: 2022-07-10 | End: 2022-07-13 | Stop reason: ALTCHOICE

## 2022-07-10 RX ORDER — FAMOTIDINE 20 MG/1
20 TABLET, FILM COATED ORAL DAILY
Status: DISCONTINUED | OUTPATIENT
Start: 2022-07-10 | End: 2022-07-13 | Stop reason: HOSPADM

## 2022-07-10 RX ORDER — ASPIRIN 81 MG/1
81 TABLET, CHEWABLE ORAL DAILY
Status: DISCONTINUED | OUTPATIENT
Start: 2022-07-11 | End: 2022-07-13 | Stop reason: HOSPADM

## 2022-07-10 RX ORDER — LATANOPROST 50 UG/ML
1 SOLUTION/ DROPS OPHTHALMIC NIGHTLY
Status: DISCONTINUED | OUTPATIENT
Start: 2022-07-10 | End: 2022-07-13 | Stop reason: HOSPADM

## 2022-07-10 RX ORDER — FOLIC ACID 1 MG/1
1000 TABLET ORAL DAILY
Status: DISCONTINUED | OUTPATIENT
Start: 2022-07-10 | End: 2022-07-13 | Stop reason: HOSPADM

## 2022-07-10 RX ORDER — TIMOLOL MALEATE 5 MG/ML
1 SOLUTION/ DROPS OPHTHALMIC 2 TIMES DAILY
Status: DISCONTINUED | OUTPATIENT
Start: 2022-07-10 | End: 2022-07-13 | Stop reason: HOSPADM

## 2022-07-10 RX ORDER — HYDROCODONE BITARTRATE AND ACETAMINOPHEN 5; 325 MG/1; MG/1
1 TABLET ORAL EVERY 12 HOURS PRN
Status: DISCONTINUED | OUTPATIENT
Start: 2022-07-10 | End: 2022-07-13 | Stop reason: HOSPADM

## 2022-07-10 RX ORDER — CETIRIZINE HYDROCHLORIDE 10 MG/1
10 TABLET ORAL DAILY
Status: DISCONTINUED | OUTPATIENT
Start: 2022-07-10 | End: 2022-07-10 | Stop reason: DRUGHIGH

## 2022-07-10 RX ORDER — HEPARIN SODIUM 10000 [USP'U]/100ML
5-30 INJECTION, SOLUTION INTRAVENOUS CONTINUOUS
Status: DISCONTINUED | OUTPATIENT
Start: 2022-07-10 | End: 2022-07-13

## 2022-07-10 RX ORDER — CETIRIZINE HYDROCHLORIDE 10 MG/1
5 TABLET ORAL DAILY
Status: DISCONTINUED | OUTPATIENT
Start: 2022-07-10 | End: 2022-07-13 | Stop reason: HOSPADM

## 2022-07-10 RX ORDER — ONDANSETRON 4 MG/1
4 TABLET, ORALLY DISINTEGRATING ORAL EVERY 8 HOURS PRN
Status: DISCONTINUED | OUTPATIENT
Start: 2022-07-10 | End: 2022-07-13 | Stop reason: HOSPADM

## 2022-07-10 RX ORDER — BRIMONIDINE TARTRATE, TIMOLOL MALEATE 2; 5 MG/ML; MG/ML
1 SOLUTION/ DROPS OPHTHALMIC EVERY 12 HOURS
Status: DISCONTINUED | OUTPATIENT
Start: 2022-07-10 | End: 2022-07-10 | Stop reason: CLARIF

## 2022-07-10 RX ORDER — ONDANSETRON 2 MG/ML
4 INJECTION INTRAMUSCULAR; INTRAVENOUS EVERY 6 HOURS PRN
Status: DISCONTINUED | OUTPATIENT
Start: 2022-07-10 | End: 2022-07-13 | Stop reason: HOSPADM

## 2022-07-10 RX ORDER — 0.9 % SODIUM CHLORIDE 0.9 %
1000 INTRAVENOUS SOLUTION INTRAVENOUS ONCE
Status: COMPLETED | OUTPATIENT
Start: 2022-07-10 | End: 2022-07-10

## 2022-07-10 RX ORDER — SODIUM CHLORIDE 0.9 % (FLUSH) 0.9 %
5-40 SYRINGE (ML) INJECTION EVERY 12 HOURS SCHEDULED
Status: DISCONTINUED | OUTPATIENT
Start: 2022-07-10 | End: 2022-07-13 | Stop reason: HOSPADM

## 2022-07-10 RX ADMIN — HEPARIN SODIUM 4380 UNITS: 1000 INJECTION INTRAVENOUS; SUBCUTANEOUS at 02:24

## 2022-07-10 RX ADMIN — LATANOPROST 1 DROP: 50 SOLUTION OPHTHALMIC at 20:25

## 2022-07-10 RX ADMIN — Medication 1 CAPSULE: at 10:25

## 2022-07-10 RX ADMIN — CYANOCOBALAMIN TAB 1000 MCG 1000 MCG: 1000 TAB at 10:25

## 2022-07-10 RX ADMIN — Medication 100 MG: at 10:25

## 2022-07-10 RX ADMIN — ATORVASTATIN CALCIUM 20 MG: 20 TABLET, FILM COATED ORAL at 20:24

## 2022-07-10 RX ADMIN — SODIUM CHLORIDE 1000 ML: 9 INJECTION, SOLUTION INTRAVENOUS at 02:26

## 2022-07-10 RX ADMIN — HEPARIN SODIUM 12 UNITS/KG/HR: 10000 INJECTION, SOLUTION INTRAVENOUS at 02:24

## 2022-07-10 RX ADMIN — TIMOLOL MALEATE 1 DROP: 5 SOLUTION OPHTHALMIC at 10:27

## 2022-07-10 RX ADMIN — FAMOTIDINE 20 MG: 20 TABLET ORAL at 10:25

## 2022-07-10 RX ADMIN — Medication 2000 UNITS: at 10:25

## 2022-07-10 RX ADMIN — CETIRIZINE HYDROCHLORIDE 5 MG: 10 TABLET, FILM COATED ORAL at 14:38

## 2022-07-10 RX ADMIN — METOPROLOL TARTRATE 25 MG: 25 TABLET, FILM COATED ORAL at 20:24

## 2022-07-10 RX ADMIN — SODIUM CHLORIDE, PRESERVATIVE FREE 10 ML: 5 INJECTION INTRAVENOUS at 10:25

## 2022-07-10 RX ADMIN — BRIMONIDINE TARTRATE 1 DROP: 2 SOLUTION OPHTHALMIC at 20:25

## 2022-07-10 RX ADMIN — METOPROLOL TARTRATE 25 MG: 25 TABLET, FILM COATED ORAL at 10:25

## 2022-07-10 RX ADMIN — FOLIC ACID 1000 MCG: 1 TABLET ORAL at 10:25

## 2022-07-10 RX ADMIN — BRIMONIDINE TARTRATE 1 DROP: 2 SOLUTION OPHTHALMIC at 10:27

## 2022-07-10 RX ADMIN — TIMOLOL MALEATE 1 DROP: 5 SOLUTION OPHTHALMIC at 20:25

## 2022-07-10 RX ADMIN — SODIUM CHLORIDE, PRESERVATIVE FREE 10 ML: 5 INJECTION INTRAVENOUS at 20:25

## 2022-07-10 RX ADMIN — ACETAMINOPHEN 650 MG: 325 TABLET ORAL at 21:55

## 2022-07-10 ASSESSMENT — LIFESTYLE VARIABLES
HOW MANY STANDARD DRINKS CONTAINING ALCOHOL DO YOU HAVE ON A TYPICAL DAY: PATIENT DECLINED
HOW OFTEN DO YOU HAVE A DRINK CONTAINING ALCOHOL: NEVER

## 2022-07-10 ASSESSMENT — PAIN SCALES - GENERAL
PAINLEVEL_OUTOF10: 0
PAINLEVEL_OUTOF10: 2

## 2022-07-10 NOTE — PLAN OF CARE
Problem: Safety - Adult  Goal: Free from fall injury  Outcome: Progressing  Flowsheets (Taken 7/10/2022 0800)  Free From Fall Injury: Instruct family/caregiver on patient safety     Problem: ABCDS Injury Assessment  Goal: Absence of physical injury  Outcome: Progressing  Flowsheets (Taken 7/10/2022 0800)  Absence of Physical Injury: Implement safety measures based on patient assessment     Problem: Pain  Goal: Verbalizes/displays adequate comfort level or baseline comfort level  Outcome: Progressing

## 2022-07-10 NOTE — PROGRESS NOTES
Call placed to Irvin at [facility] to request med list. Spoke to [place]. States she will fax current med list to unit.

## 2022-07-10 NOTE — PROGRESS NOTES
Brief internal medicine follow-up:    Patient seen and examined, H&P done and billing for this calendar day. Patient also seen and examined by myself, discussion with patient and son. 80-year-old male who presents with a presyncopal experience shortly after a bowel movement and standing at the sink. However he was also short of breath and in the emergency room was found to have a non-ST elevated acute MI by enzymes. EKG did not show any acute changes. He was placed on a heparin drip and at the time of my interview was completely pain-free and had no complaints at all. Patient reiterates both to me and cardiology that he would not want any new coronary interventions done. For this reason there will be no ischemic eval.  We will try to manage medically. Echocardiogram done to help guide therapy. Continue heparin 1 more day. PT OT eval tomorrow as patient seems weak to nursing when walking to the bathroom.     Andie Guerrero MD

## 2022-07-10 NOTE — H&P
[facility] History and Physical      CHIEF COMPLAINT:  Near syncope    History of Present Illness: This is a 80year old male aortic stenosis status post surgical aVR 6/29/14 (#27 [place] trifecta), hypertension, CAD, hyperlipidemia, CKD, KISHA, neuropathy, chronic back pain, and carotid arterial disease. Has not followed up with cardiologist since 2016. Prior cardiology group from [facility]. Patient to ED due to shortness of breath. States he was having constipation yesterday at assisted living and had some prune juice for breakfast. Afterwards patient states that he went to the BR multiple times. Last time he went to the BR his legs gave out and he slid down the wall. Denies dizziness, lightheadedness, LOC, palpitations and/or CP. Patient states that the staff helped him back to his recliner and then he started to become SOB afterwards. Nothing made worse or improved shortness of breath. Informant(s) for H&P: Patient and chart review    REVIEW OF SYSTEMS:  A comprehensive review of systems was negative except for: what is in the HPI      PMH:  Past Medical History:   Diagnosis Date    CKD (chronic kidney disease)     Fall     Hyperlipidemia     Hypertension     Iron deficiency anemia     Subdural hematoma (HCC)     Vitamin D deficiency        Surgical History:  Past Surgical History:   Procedure Laterality Date    BACK SURGERY      CABG WITH AORTIC VALVE REPAIR         Medications Prior to Admission:    Prior to Admission medications    Medication Sig Start Date End Date Taking?  Authorizing Provider   lactobacillus (CULTURELLE) CAPS capsule Take 1 capsule by mouth daily 5/6/22   Madonna Alcantara MD   loperamide (IMODIUM) 2 MG capsule Take 1 capsule by mouth 4 times daily as needed for Diarrhea 5/5/22   Madonna Alcantara MD   bismuth subsalicylate (BISMATROL) 262 MG/15ML suspension Take 30 mLs by mouth 4 times daily as needed for Indigestion 5/5/22   Madonna Alcantara MD HYDROcodone-acetaminophen (NORCO) 5-325 MG per tablet Take 1 tablet by mouth every 12 hours as needed for Pain. Historical Provider, MD   loratadine (CLARITIN) 10 MG tablet Take 10 mg by mouth daily as needed (allergies)    Historical Provider, MD   brimonidine-timolol (COMBIGAN) 0.2-0.5 % ophthalmic solution Place 1 drop into both eyes every 12 hours    Historical Provider, MD   Cholecalciferol (VITAMIN D3) 50 MCG (2000 UT) CAPS Take 1 capsule by mouth daily    Historical Provider, MD   famotidine (PEPCID) 20 MG tablet Take 20 mg by mouth daily 3 pm    Historical Provider, MD   vitamin B-12 (CYANOCOBALAMIN) 1000 MCG tablet Take 1,000 mcg by mouth daily    Historical Provider, MD   vitamin B-6 (PYRIDOXINE) 100 MG tablet Take 100 mg by mouth daily    Historical Provider, MD   Folic Acid 0.8 MG CAPS Take 1 capsule by mouth daily    Historical Provider, MD   metoprolol tartrate (LOPRESSOR) 25 MG tablet Take 25 mg by mouth 2 times daily    Historical Provider, MD   atorvastatin (LIPITOR) 40 MG tablet Take 40 mg by mouth daily    Historical Provider, MD   Travoprost, BAK Free, (TRAVATAN Z) 0.004 % SOLN ophthalmic solution Place 1 drop into both eyes nightly    Historical Provider, MD   dimethicone-zinc oxide (DARIUS PROTECT) cream Apply topically daily as needed for Dry Skin    Historical Provider, MD       Allergies:    Patient has no known allergies. Social History:    reports that he has never smoked. He has never used smokeless tobacco. He reports current alcohol use. He reports that he does not use drugs. Family History:   family history is not on file. Patient denies significant family history.       PHYSICAL EXAM:  Vitals:  BP (!) 123/58   Pulse 66   Temp 98.5 °F (36.9 °C) (Oral)   Resp 14   Wt 161 lb (73 kg)   SpO2 95%   BMI 22.45 kg/m²     General Appearance: alert and oriented to person, place and time and in no acute distress  Skin: warm and dry  Head: normocephalic and atraumatic  Eyes: pupils equal, round, and reactive to light, conjunctivae normal  Pulmonary/Chest: Lungs diminished with rales auscultated posterior bilateral bases. Non-productive cough noted. Cardiovascular: normal rate, normal S1 and S2   Abdomen: soft, non-tender, non-distended, normal bowel sounds, no masses or organomegaly  Extremities: no cyanosis, no clubbing and no edema  Neurologic: speech normal        LABS:  Recent Labs     07/09/22 2014      K 4.3   CL 98   CO2 18*   BUN 25*   CREATININE 1.3*   GLUCOSE 138*   CALCIUM 8.8       Recent Labs     07/09/22 2014   WBC 10.9   RBC 3.60*   HGB 11.3*   HCT 34.1*   MCV 94.7   MCH 31.4   MCHC 33.1   RDW 15.7*      MPV 10.4       No results for input(s): POCGLU in the last 72 hours. CBC with Differential:    Lab Results   Component Value Date/Time    WBC 10.9 07/09/2022 08:14 PM    RBC 3.60 07/09/2022 08:14 PM    HGB 11.3 07/09/2022 08:14 PM    HCT 34.1 07/09/2022 08:14 PM     07/09/2022 08:14 PM    MCV 94.7 07/09/2022 08:14 PM    MCH 31.4 07/09/2022 08:14 PM    MCHC 33.1 07/09/2022 08:14 PM    RDW 15.7 07/09/2022 08:14 PM    LYMPHOPCT 11.6 07/09/2022 08:14 PM    MONOPCT 14.2 07/09/2022 08:14 PM    BASOPCT 0.5 07/09/2022 08:14 PM    MONOSABS 1.55 07/09/2022 08:14 PM    LYMPHSABS 1.27 07/09/2022 08:14 PM    EOSABS 0.14 07/09/2022 08:14 PM    BASOSABS 0.05 07/09/2022 08:14 PM     BMP:    Lab Results   Component Value Date/Time     07/09/2022 08:14 PM    K 4.3 07/09/2022 08:14 PM    K 4.3 05/04/2022 05:40 AM    CL 98 07/09/2022 08:14 PM    CO2 18 07/09/2022 08:14 PM    BUN 25 07/09/2022 08:14 PM    LABALBU 4.1 05/03/2022 02:06 PM    CREATININE 1.3 07/09/2022 08:14 PM    CALCIUM 8.8 07/09/2022 08:14 PM    GFRAA >60 07/09/2022 08:14 PM    LABGLOM 52 07/09/2022 08:14 PM    GLUCOSE 138 07/09/2022 08:14 PM       Radiology:   XR CHEST PORTABLE   Final Result   No acute cardiopulmonary abnormality.              EKG:       ASSESSMENT:      Principal Problem:    NSTEMI

## 2022-07-10 NOTE — CONSULTS
INPATIENT CARDIOLOGY CONSULT    Name: Sydney Woo    Age: 80 y.o. Date of Admission: 7/9/2022  7:14 PM    Date of Service: 7/10/2022    Reason for Consultation: Non-ST elevation myocardial infarction, coronary atherosclerosis, aortic valve disease, hypertension, chronic kidney disease    Referring Physician: Luisa Carrasquillo MD    History of Present Illness: The patient is a 51-year-old white male with no association to [address] [facility] and prior cardiovascular care exclusively performed at [place] [address]. He has a known history of coronary atherosclerosis with surgical revascularization in 2001 inclusive of a left internal mammary graft to the left anterior descending and saphenous vein grafts to the first and second circumflex marginal branches as well as the posterior descending branch of the right coronary artery with most recent coronary angiography in April, 2014 demonstrating evidence of extensive native disease and patency of all grafts in the face of severe aortic stenosis with subsequent performance of surgical aortic valve replacement (27 mm [address] [place]ond trifecta bioprosthesis) and most recent echocardiographic evaluation in June, 2016 demonstrating a normal-sized left ventricular chamber with mild concentric left ventricular hypertrophy and normal left ventricular systolic function with appropriate function of the bioprosthesis and mild mitral leaflet thickening and annular calcification with mild mitral regurgitation. He has subsequently exclusively followed by his primary care physician and presently resides in an assisted living with limited ambulation due to lower extremity discomfort and a peripheral neuropathy functional capabilities of approximately 4 METs. He remained compensated at time of his most recent clinical evaluation within the past month.   He remained in his usual state of health until the day of hospitalization on which he noted significant constipation and upon attempting relief with his standard measures had several bowel movements and following his last episode while standing at his sink became somewhat lightheaded without additional cardiovascular symptoms. On attempting to return to his bedroom, he became extremely weak and slid down the wall without a loss of consciousness and upon returning to his bed noted the onset of dyspnea in the absence of chest discomfort or other ischemic equivalents with nursing staff suggesting additional medical attention. At time of his emergency room evaluation, he was asymptomatic with a resting electrocardiogram reviewed at the time of assessment demonstrating evidence of sinus rhythm with occasional ventricular ectopy with left axis deviation, a delayed precordial transition zone and nonspecific ST changes a chest x-ray again reviewed demonstrating no evidence of cardiomegaly or infiltrate. Additional laboratory studies demonstrated elevation of high-sensitivity troponin levels in a pattern consistent with that of an acute coronary syndrome and a proBNP level of 5795 pg/mL in the absence of clinical volume overload and with evidence of stage IIIb chronic kidney disease. Beyond that of his chronic weakness, he denied additional symptomatology at time of evaluation. .    Review of Systems: The remainder of a complete multisystem review including consitutional, central nervous, respiratory, circulatory, gastrointestinal, genitourinary, endocrinologic, hematologic, musculoskeletal and psychiatric are negative. Past Medical History:  Past Medical History:   Diagnosis Date    CKD (chronic kidney disease)     Fall     Hyperlipidemia     Hypertension     Iron deficiency anemia     Subdural hematoma (HCC)     Vitamin D deficiency        Past Surgical History:  Past Surgical History:   Procedure Laterality Date    BACK SURGERY      CABG WITH AORTIC VALVE REPAIR         Family History:  History reviewed.  No pertinent family history. Social History:  Social History     Socioeconomic History    Marital status:      Spouse name: Not on file    Number of children: Not on file    Years of education: Not on file    Highest education level: Not on file   Occupational History    Not on file   Tobacco Use    Smoking status: Never Smoker    Smokeless tobacco: Never Used   Substance and Sexual Activity    Alcohol use: Yes     Comment: occasionally    Drug use: Never    Sexual activity: Not on file   Other Topics Concern    Not on file   Social History Narrative    Not on file     Social Determinants of Health     Financial Resource Strain:     Difficulty of Paying Living Expenses: Not on file   Food Insecurity:     Worried About Running Out of Food in the Last Year: Not on file    Maynor of Food in the Last Year: Not on file   Transportation Needs:     Lack of Transportation (Medical): Not on file    Lack of Transportation (Non-Medical):  Not on file   Physical Activity:     Days of Exercise per Week: Not on file    Minutes of Exercise per Session: Not on file   Stress:     Feeling of Stress : Not on file   Social Connections:     Frequency of Communication with Friends and Family: Not on file    Frequency of Social Gatherings with Friends and Family: Not on file    Attends Faith Services: Not on file    Active Member of [address] [place] [address] [employer] or Organizations: Not on file    Attends Club or Organization Meetings: Not on file    Marital Status: Not on file   Intimate Partner Violence:     Fear of Current or Ex-Partner: Not on file    Emotionally Abused: Not on file    Physically Abused: Not on file    Sexually Abused: Not on file   Housing Stability:     Unable to Pay for Housing in the Last Year: Not on file    Number of [place] in the Last Year: Not on file    Unstable Housing in the Last Year: Not on file       Allergies:  No Known Allergies    Home Medications:  Prior to Admission medications Medication Sig Start Date End Date Taking? Authorizing Provider   bismuth subsalicylate (PEPTO-BISMOL) 262 MG/15ML suspension Take 15 mLs by mouth every 4 hours as needed for Indigestion   Yes Historical Provider, MD   Lactobacillus (ACIDOPHILUS) 0.5 MG TABS Take 1 tablet by mouth daily   Yes Historical Provider, MD   loperamide (IMODIUM) 2 MG capsule Take 1 capsule by mouth 4 times daily as needed for Diarrhea 5/5/22   Garlan Burkitt, MD   HYDROcodone-acetaminophen (NORCO) 5-325 MG per tablet Take 1 tablet by mouth every 12 hours as needed for Pain.     Historical Provider, MD   loratadine (CLARITIN) 10 MG tablet Take 10 mg by mouth daily as needed (allergies)    Historical Provider, MD   brimonidine-timolol (COMBIGAN) 0.2-0.5 % ophthalmic solution Place 1 drop into both eyes every 12 hours    Historical Provider, MD   Cholecalciferol (VITAMIN D3) 50 MCG (2000 UT) CAPS Take 1 capsule by mouth daily    Historical Provider, MD   famotidine (PEPCID) 20 MG tablet Take 20 mg by mouth daily 3 pm    Historical Provider, MD   vitamin B-12 (CYANOCOBALAMIN) 1000 MCG tablet Take 1,000 mcg by mouth daily    Historical Provider, MD   vitamin B-6 (PYRIDOXINE) 100 MG tablet Take 100 mg by mouth daily    Historical Provider, MD   Folic Acid 0.8 MG CAPS Take 1 capsule by mouth daily    Historical Provider, MD   metoprolol tartrate (LOPRESSOR) 25 MG tablet Take 12.5 mg by mouth 2 times daily     Historical Provider, MD   atorvastatin (LIPITOR) 40 MG tablet Take 20 mg by mouth at bedtime     Historical Provider, MD   Travoprost, BAK Free, (TRAVATAN Z) 0.004 % SOLN ophthalmic solution Place 1 drop into both eyes nightly    Historical Provider, MD   dimethicone-zinc oxide (DARIUS PROTECT) cream Apply topically daily as needed for Dry Skin    Historical Provider, MD       Current Medications:  Current Facility-Administered Medications   Medication Dose Route Frequency Provider Last Rate Last Admin    heparin (porcine) injection 4,380 Units  60 Units/kg IntraVENous PRN Oneil Font, DO        heparin (porcine) injection 2,190 Units  30 Units/kg IntraVENous PRN Oneil Font, DO        heparin 25,000 units in dextrose 5% 250 mL (premix) infusion  5-30 Units/kg/hr IntraVENous Continuous Elizabethzina Chua, DO 8.8 mL/hr at 07/10/22 0224 12 Units/kg/hr at 07/10/22 0224    sodium chloride flush 0.9 % injection 5-40 mL  5-40 mL IntraVENous 2 times per day Rodrigue Jose, APRN - CNP        sodium chloride flush 0.9 % injection 5-40 mL  5-40 mL IntraVENous PRN Rodrigue Jose, APRN - CNP        0.9 % sodium chloride infusion   IntraVENous PRN Rodrigue Jose, APRN - CNP        ondansetron (ZOFRAN-ODT) disintegrating tablet 4 mg  4 mg Oral Q8H PRN Rodrigue Jose, APRN - CNP        Or    ondansetron (ZOFRAN) injection 4 mg  4 mg IntraVENous Q6H PRN Rodrigue Jose, APRN - CNP        acetaminophen (TYLENOL) tablet 650 mg  650 mg Oral Q6H PRN Rodrigue Jose, APRN - CNP        Or    acetaminophen (TYLENOL) suppository 650 mg  650 mg Rectal Q6H PRN Rodrigue Jose, APRN - CNP        polyethylene glycol (GLYCOLAX) packet 17 g  17 g Oral Daily PRN Rodirgue Jose, APRN - CNP        [START ON 7/11/2022] aspirin chewable tablet 81 mg  81 mg Oral Daily Rodrigue Jose, APRN - CNP        vitamin D (CHOLECALCIFEROL) tablet 2,000 Units  2,000 Units Oral Daily Rodrigue Jose, APRN - CNP        famotidine (PEPCID) tablet 20 mg  20 mg Oral Daily Rodrigue Jose, APRN - CNP        folic acid (FOLVITE) tablet 1,000 mcg  1,000 mcg Oral Daily Rodrigue Jose, APRN - CNP        HYDROcodone-acetaminophen (NORCO) 5-325 MG per tablet 1 tablet  1 tablet Oral Q12H PRN Rodrigue Jose, APRN - CNP        lactobacillus (CULTURELLE) capsule 1 capsule  1 capsule Oral Daily Rodrigue Jose, APRN - CNP        cetirizine (ZYRTEC) tablet 10 mg  10 mg Oral Daily Rodrigue Jose, APRN - CNP        latanoprost (XALATAN) 0.005 % ophthalmic solution 1 drop  1 from [facility] demonstrate evidence of a normal-sized left ventricular chamber with mild concentric left ventricular hypertrophy and normal left ventricular systolic function with mild left atrial enlargement. Normal function of an aortic bioprosthesis and mild mitral leaflet thickening and annular calcification with mild mitral regurgitation  Last cardiac catheterization: Coronary angiography of April, 2014 prior to aortic valve intervention demonstrated evidence of extensive multivessel disease inclusive of an [de-identified] to 90% stenosis of the distal left main trunk with an 80% proximal left anterior descending stenosis and 50% stenosis within the mid circumflex with occlusion of a circumflex marginal branch and occlusion of the right coronary artery with a patent left internal mammary graft to the left anterior descending and saphenous vein grafts to the first and second circumflex marginal branch with an aneurysm within the proximal portion of the graft to the second marginal branch as well as patency of a saphenous vein graft to the right coronary artery    ASSESSMENT / PLAN: On clinical basis, the patient presented with relatively nonspecific symptoms beyond that of dyspnea not incompatible with that of an acute coronary syndrome and present evidence of a non-ST elevation myocardial infarction on the basis of the pattern of his high-sensitivity troponin levels. He is noted no further symptomatology nor evidence of heart failure or arrhythmias subsequent to presentation with plans of medical therapy inclusive of the continuation of his heparin over the next 24 hours. An echocardiogram will be obtained to evaluate the presence or absence of wall motion abnormalities as well as that of left ventricular systolic function to guide additional management decisions.   At the time of evaluation, on discussion of his advanced directives and wishes, he wishes no further invasive management on his

## 2022-07-11 LAB
ANION GAP SERPL CALCULATED.3IONS-SCNC: 13 MMOL/L (ref 7–16)
APTT: 69.2 SEC (ref 24.5–35.1)
BUN BLDV-MCNC: 25 MG/DL (ref 6–23)
CALCIUM SERPL-MCNC: 9.2 MG/DL (ref 8.6–10.2)
CHLORIDE BLD-SCNC: 101 MMOL/L (ref 98–107)
CO2: 22 MMOL/L (ref 22–29)
CREAT SERPL-MCNC: 1.3 MG/DL (ref 0.7–1.2)
GFR AFRICAN AMERICAN: >60
GFR NON-AFRICAN AMERICAN: 52 ML/MIN/1.73
GLUCOSE BLD-MCNC: 118 MG/DL (ref 74–99)
POTASSIUM SERPL-SCNC: 4.7 MMOL/L (ref 3.5–5)
SODIUM BLD-SCNC: 136 MMOL/L (ref 132–146)
TROPONIN, HIGH SENSITIVITY: 205 NG/L (ref 0–11)

## 2022-07-11 PROCEDURE — 99233 SBSQ HOSP IP/OBS HIGH 50: CPT | Performed by: INTERNAL MEDICINE

## 2022-07-11 PROCEDURE — 97165 OT EVAL LOW COMPLEX 30 MIN: CPT

## 2022-07-11 PROCEDURE — 93005 ELECTROCARDIOGRAM TRACING: CPT | Performed by: NURSE PRACTITIONER

## 2022-07-11 PROCEDURE — 6370000000 HC RX 637 (ALT 250 FOR IP): Performed by: NURSE PRACTITIONER

## 2022-07-11 PROCEDURE — 99232 SBSQ HOSP IP/OBS MODERATE 35: CPT | Performed by: INTERNAL MEDICINE

## 2022-07-11 PROCEDURE — 80048 BASIC METABOLIC PNL TOTAL CA: CPT

## 2022-07-11 PROCEDURE — 85730 THROMBOPLASTIN TIME PARTIAL: CPT

## 2022-07-11 PROCEDURE — 2060000000 HC ICU INTERMEDIATE R&B

## 2022-07-11 PROCEDURE — 6370000000 HC RX 637 (ALT 250 FOR IP): Performed by: INTERNAL MEDICINE

## 2022-07-11 PROCEDURE — 36415 COLL VENOUS BLD VENIPUNCTURE: CPT

## 2022-07-11 PROCEDURE — 6360000002 HC RX W HCPCS: Performed by: EMERGENCY MEDICINE

## 2022-07-11 PROCEDURE — 97161 PT EVAL LOW COMPLEX 20 MIN: CPT

## 2022-07-11 PROCEDURE — 2580000003 HC RX 258: Performed by: NURSE PRACTITIONER

## 2022-07-11 PROCEDURE — 6360000002 HC RX W HCPCS: Performed by: INTERNAL MEDICINE

## 2022-07-11 PROCEDURE — 84484 ASSAY OF TROPONIN QUANT: CPT

## 2022-07-11 RX ORDER — FUROSEMIDE 10 MG/ML
40 INJECTION INTRAMUSCULAR; INTRAVENOUS 2 TIMES DAILY
Status: DISCONTINUED | OUTPATIENT
Start: 2022-07-11 | End: 2022-07-12

## 2022-07-11 RX ADMIN — CETIRIZINE HYDROCHLORIDE 5 MG: 10 TABLET, FILM COATED ORAL at 08:55

## 2022-07-11 RX ADMIN — CYANOCOBALAMIN TAB 1000 MCG 1000 MCG: 1000 TAB at 08:55

## 2022-07-11 RX ADMIN — Medication 2000 UNITS: at 08:55

## 2022-07-11 RX ADMIN — METOPROLOL TARTRATE 25 MG: 25 TABLET, FILM COATED ORAL at 20:09

## 2022-07-11 RX ADMIN — BRIMONIDINE TARTRATE 1 DROP: 2 SOLUTION OPHTHALMIC at 08:56

## 2022-07-11 RX ADMIN — TIMOLOL MALEATE 1 DROP: 5 SOLUTION OPHTHALMIC at 08:56

## 2022-07-11 RX ADMIN — ACETAMINOPHEN 650 MG: 325 TABLET ORAL at 21:54

## 2022-07-11 RX ADMIN — ASPIRIN 81 MG CHEWABLE TABLET 81 MG: 81 TABLET CHEWABLE at 08:54

## 2022-07-11 RX ADMIN — SODIUM CHLORIDE, PRESERVATIVE FREE 10 ML: 5 INJECTION INTRAVENOUS at 08:54

## 2022-07-11 RX ADMIN — METOPROLOL TARTRATE 25 MG: 25 TABLET, FILM COATED ORAL at 08:55

## 2022-07-11 RX ADMIN — Medication 100 MG: at 08:54

## 2022-07-11 RX ADMIN — LATANOPROST 1 DROP: 50 SOLUTION OPHTHALMIC at 20:09

## 2022-07-11 RX ADMIN — ATORVASTATIN CALCIUM 20 MG: 20 TABLET, FILM COATED ORAL at 20:09

## 2022-07-11 RX ADMIN — ACETAMINOPHEN 650 MG: 325 TABLET ORAL at 10:49

## 2022-07-11 RX ADMIN — SODIUM CHLORIDE, PRESERVATIVE FREE 10 ML: 5 INJECTION INTRAVENOUS at 20:09

## 2022-07-11 RX ADMIN — Medication 1 CAPSULE: at 08:54

## 2022-07-11 RX ADMIN — HEPARIN SODIUM 12 UNITS/KG/HR: 10000 INJECTION, SOLUTION INTRAVENOUS at 08:22

## 2022-07-11 RX ADMIN — FUROSEMIDE 40 MG: 10 INJECTION, SOLUTION INTRAMUSCULAR; INTRAVENOUS at 16:53

## 2022-07-11 RX ADMIN — FAMOTIDINE 20 MG: 20 TABLET ORAL at 08:55

## 2022-07-11 RX ADMIN — FOLIC ACID 1000 MCG: 1 TABLET ORAL at 08:55

## 2022-07-11 RX ADMIN — TIMOLOL MALEATE 1 DROP: 5 SOLUTION OPHTHALMIC at 20:11

## 2022-07-11 ASSESSMENT — PAIN SCALES - GENERAL
PAINLEVEL_OUTOF10: 0
PAINLEVEL_OUTOF10: 0

## 2022-07-11 NOTE — DISCHARGE INSTR - COC
COVID-19 (Rule Out) 07/09/22 07/09/22 07/09/22 COVID-19, Rapid (Ordered)   07/09/22 Rule-Out Test Resulted    Rotavirus 05/04/22 05/05/22 05/04/22 ROTAVIRUS ANTIGEN, STOOL   07/10/22 Brie Collado RN    COVID-19 (Rule Out) 05/03/22 05/03/22 05/03/22 Respiratory Panel, Molecular, with COVID-19 (Restricted: peds pts or suitable admitted adults) (Ordered)   05/03/22 Rule-Out Test Resulted    COVID-19 (Rule Out) 05/03/22 05/03/22 05/03/22 COVID-19, Rapid (Ordered)   05/03/22 Rule-Out Test Resulted    C-diff Rule Out 05/03/22 05/03/22 05/04/22 CLOSTRIDIUM DIFFICILE EIA (Ordered)   05/04/22 Rule-Out Test Resulted            Nurse Assessment:  Last Vital Signs: BP (!) 155/70   Pulse 79   Temp 98.1 °F (36.7 °C) (Oral)   Resp 18   Ht 5' 10\" (1.778 m)   Wt 161 lb (73 kg)   SpO2 97%   BMI 23.10 kg/m²     Last documented pain score (0-10 scale): Pain Level: 0  Last Weight:   Wt Readings from Last 1 Encounters:   07/10/22 161 lb (73 kg)     Mental Status:  oriented    IV Access:  - None    Nursing Mobility/ADLs:  Walking   Assisted  Transfer  Assisted  Bathing  Assisted  Dressing  Assisted  Toileting  Assisted  Feeding  Independent  Med Admin  Dependent  Med Delivery   whole    Wound Care Documentation and Therapy:        Elimination:  Continence: Bowel: {YES / FP:83553} Occasionally Incontinent  Bladder: {YES / IL:02649} Yes  Urinary Catheter: None   Colostomy/Ileostomy/Ileal Conduit: No       Date of Last BM: 07/13/22  No intake or output data in the 24 hours ending 07/11/22 1204  No intake/output data recorded. Safety Concerns: At Risk for Falls    Impairments/Disabilities:      Weakness    Nutrition Therapy:  Current Nutrition Therapy:   - Oral Diet:  Regular; Low Fat/Low Chol/High Fiber     Routes of Feeding: Oral  Liquids:  Thin Liquids  Daily Fluid Restriction: no  Last Modified Barium Swallow with Video (Video Swallowing Test): not done    Treatments at the Time of Hospital Discharge:   Respiratory Treatments: N/A  Oxygen Therapy:  is not on home oxygen therapy. Ventilator:    - No ventilator support    Rehab Therapies: PT OT eval and treat  Weight Bearing Status/Restrictions: No weight bearing restrictions  Other Medical Equipment (for information only, NOT a DME order):  {EQUIPMENT:[id number]}  Other Treatments: ***    Patient's personal belongings (please select all that are sent with patient):      RN SIGNATURE:  Electronically signed by Cortez Alvarez RN on 7/13/22 at 12:24 PM EDT    CASE MANAGEMENT/SOCIAL WORK SECTION    Inpatient Status Date: ***    Readmission Risk Assessment Score:  Readmission Risk              Risk of Unplanned Readmission:  16           Discharging to Facility/ Agency   Name: [place] [id number], [address] [facility]         Phone: [phone]       Fax: [phone]          Dialysis Facility (if applicable)   Name:  Address:  Dialysis Schedule:  Phone:  Fax:    / signature: Electronically signed by CLEVELAND Douglas on 7/11/22 at 12:04 PM EDT    PHYSICIAN SECTION    Prognosis: {Prognosis:4031497319}    Condition at Discharge: [address] Patient Condition:[id number]}    Rehab Potential (if transferring to Rehab): {Prognosis:5888438850}    Recommended Labs or Other Treatments After Discharge: ***    Physician Certification: I certify the above information and transfer of [place]  is necessary for the continuing treatment of the diagnosis listed and that he requires [place] for less than 30 days.      Update Admission H&P: {CHP DME Changes in MFDEL:[id number]}    PHYSICIAN SIGNATURE:  {Esignature:[id number]}

## 2022-07-11 NOTE — PROGRESS NOTES
P Quality Flow/Interdisciplinary Rounds Progress Note        Quality Flow Rounds held on July 11, 2022    Disciplines Attending:  Bedside Nurse,  and Nursing Unit Leadership    Christian Mann was admitted on 7/9/2022  7:14 PM    Anticipated Discharge Date:       Disposition:    Gilberto Score:  Gilberto Scale Score: 19    Readmission Risk              Risk of Unplanned Readmission:  16           Discussed patient goal for the day, patient clinical progression, and barriers to discharge. The following Goal(s) of the Day/Commitment(s) have been identified:  hep gtt.       Edwina Guerra RN  July 11, 2022

## 2022-07-11 NOTE — PROGRESS NOTES
[]?Decreased ROM  [x]? Decreased functional mobility  [x]? Decreased balance   [x]? Decreased endurance   [x]? Decreased posture  []? Decreased sensation  []? Decreased coordination   []? Decreased vision  []? Decreased safety awareness   []? Increased pain       · Comments:  Pt was found in bed and agreeable to PT. Pt required cues for hand placement and controlled sit with transfers. Once standing, cues for upright posture needed. Pt very weak and LE instability noted with short distance gait. Limited activity tolerance      Pt was left sitting up in chair with call light left by patient . Waffle cushion and chair alarm placed     Pt's/ family goals   1. increase strength      Patient and or family understand(s) diagnosis, prognosis, and plan of care.     PHYSICAL THERAPY PLAN OF CARE:     PT POC is established based on physician order and patient diagnosis      Referring provider/PT Order:  PT eval and treat    Specific instructions for next treatment:  Increase amb distance as pt is able.      Current Treatment Recommendations:      [x]? Strengthening to improve independence with functional mobility   []? ROM to improve ROM and decrease spasm and pain which will help promote independence with functional mobility   [x]? Balance Training to improve static/dynamic balance and to reduce fall risk  [x]? Endurance Training to improve activity tolerance during functional mobility   [x]? Transfer Training to improve safety and independence with all functional transfers   [x]? Gait Training to improve gait mechanics, endurance and assess need for appropriate assistive device  []? Stair Training in preparation for safe discharge home and/or into the community   []? Positioning to prevent skin breakdown and contractures  []? Safety and Education Training   [x]? Patient/Caregiver Education   []? HEP  []?  Other      PT long term treatment goals are located in above grid     Frequency of treatments: 2-5x/week x 1-2 weeks.     Time in  0955   Time out  1013          Evaluation Time includes thorough review of current medical information, gathering information on past medical history/social history and prior level of function, completion of standardized testing/informal observation of tasks, assessment of data and education on plan of care and goals.     CPT codes:  [x]? Low Complexity PT evaluation [id number]  []? Moderate Complexity PT evaluation 24765  []? High Complexity PT evaluation [id number]  []? PT Re-evaluation [id number]  []? Gait training 50347 ** minutes  []? Manual therapy 12773 ** minutes  []? Therapeutic activities 87828  minutes  []? Therapeutic exercises 21722 ** minutes  []?  Neuromuscular reeducation 22181 ** minutes     Emir Townsend   PT [id number]

## 2022-07-11 NOTE — PROGRESS NOTES
Occupational Therapy    OCCUPATIONAL THERAPY INITIAL EVALUATION     Manisha Turner [place], [place]         YFHB:                                                  Patient Name: Aleksandra Vaughan    MRN: [id number]    : 1930    Room: [address] [place] [address]      Evaluating OT: Redell Falling OTR/L   [id number]      Referring Neisha Henriquez MD    Specific Provider Orders/Date:OT eval and treat 7/10/2022      Diagnosis:  NSTEMI (non-ST elevated myocardial infarction) ([facility] [address] [I21.4]  Near syncope [R55]     Pertinent Medical History: back surgery      Precautions:  Fall Risk, alarm, [place]     Assessment of current deficits    [x] Functional mobility  [x]ADLs  [x] Strength               []Cognition    [x] Functional transfers   [x] IADLs         [x] Safety Awareness   [x]Endurance    [] Fine Coordination              [x] Balance      [] Vision/perception   []Sensation     []Gross Motor Coordination  [] ROM  [] Delirium                   [] Motor Control     OT PLAN OF CARE   OT POC based on physician orders, patient diagnosis and results of clinical assessment    Frequency/Duration  2-4 days/wk for 2 weeks PRN   Specific OT Treatment Interventions to include:   ADL retraining/adapted techniques and AE recommendations to increase functional independence within precautions                    Energy conservation techniques to improve tolerance for selfcare routine   Functional transfer/mobility training/DME recommendations for increased independence, safety and fall prevention         Patient/family education to increase safety and functional independence             Environmental modifications for safe mobility and completion of ADLs                             Therapeutic activity to improve functional performance during ADLs.                                          Therapeutic exercise to improve tolerance and functional strength for ADLs    Balance retraining/tolerance tasks for facilitation of postural control with dynamic challenges during ADLs . Positioning to improve functional independence  []    Recommended Adaptive Equipment: TBD     Home Living: Pt lives at [facility]. Patient reports he has been using wheelchair for longer distance. freshbag in room      Pain Level: no pain ; achyness in the legs   Cognition: A&O: pleasant, following commands and conversing    Memory:  Fair+    Sequencing:  Fair+   Problem solving:  Fair+   Judgement/safety:  Fair+     Functional Assessment:  AM-PAC Daily Activity Raw Score: 16/24   Initial Eval Status  Date: 7/11/22 Treatment Status  Date: STGs = LTGs  Time frame: 10-14 days   Feeding Independent      Grooming SBA/set-up ,seated   Decrease standing tolerance/balance  Supervision    UB Dressing SBA/set-up   Supervision    LB Dressing Mod A   Min A   Bathing Mod A   Min A   Toileting Min A  SBA    Bed Mobility  SBA  Supine to sit    Supervision    Functional Transfers Mod A  Sit- stand from bed, percy  CGA/SBA    Functional Mobility Mod A , w/walker   Steps from bed to chair   LE weakness noted   CGA with good tolerance    Balance Sitting:     Static:  Supervision - EOB     Dynamic:Min A   Standing: Mod A   SBA/supervision    Activity Tolerance No SOB noted   Good  with ADL activity    Visual/  Perceptual Glasses: yes                 Hand Dominance right   AROM (PROM) Strength Additional Info:    RUE  WFL WFL good  and wfl FMC/dexterity noted during ADL tasks       LUE WFL WFl good  and wfl FMC/dexterity noted during ADL tasks       Hearing: WFL   Sensation:  No c/o numbness or tingling   Tone: WFL  Edema: none observed     Comments: Upon arrival patient lying in bed . At end of session, patient sitting in chair  with call light and phone within reach, all lines and tubes intact.   *ALARM ON     Overall patient demonstrated  decreased independence and safety during completion of ADL/functional transfer/mobility tasks. Pt would benefit from continued skilled OT to increase safety and independence with completion of ADL/IADL tasks for functional independence and quality of life. Rehab Potential: good for established goals     Patient / Family Goal: none stated       Patient and/or family were instructed on functional diagnosis, prognosis/goals and OT plan of care. Demonstrated fair+ understanding. Eval Complexity: Low    Time In: 0959  Time Out: 1016      Min Units   OT Eval Low 97165 x  1   OT Eval Medium 86317      OT Eval High 22531      OT Re-Eval [id number]       Therapeutic Ex 15563      Therapeutic Activities 27007       ADL/Self Care 94372       Orthotic Management 78238       Manual 86395     Neuro Re-Ed 96639       Non-Billable Time          Evaluation Time additionally includes thorough review of current medical information, gathering information on past medical history/social history and prior level of function, interpretation of standardized testing/informal observation of tasks, assessment of data and development of plan of care and goals.             Shalom Maxwell  OTR/L  OT [id number]

## 2022-07-11 NOTE — PROGRESS NOTES
CNP    0.9 % sodium chloride infusion, , IntraVENous, PRN, Audrene Salaam, APRN - CNP    ondansetron (ZOFRAN-ODT) disintegrating tablet 4 mg, 4 mg, Oral, Q8H PRN **OR** ondansetron (ZOFRAN) injection 4 mg, 4 mg, IntraVENous, Q6H PRN, Audrene Salaam, APRN - CNP    acetaminophen (TYLENOL) tablet 650 mg, 650 mg, Oral, Q6H PRN, 650 mg at 07/11/22 1049 **OR** acetaminophen (TYLENOL) suppository 650 mg, 650 mg, Rectal, Q6H PRN, Audrene Salaam, APRN - CNP    polyethylene glycol (GLYCOLAX) packet 17 g, 17 g, Oral, Daily PRN, Audrene Salaam, APRN - CNP    aspirin chewable tablet 81 mg, 81 mg, Oral, Daily, Audrene Salaam, APRN - CNP, 81 mg at 07/11/22 0854    vitamin D (CHOLECALCIFEROL) tablet 2,000 Units, 2,000 Units, Oral, Daily, Audrene Salaam, APRN - CNP, 2,000 Units at 07/11/22 0855    famotidine (PEPCID) tablet 20 mg, 20 mg, Oral, Daily, Audrene Salaam, APRN - CNP, 20 mg at 22/88/79 6226    folic acid (FOLVITE) tablet 1,000 mcg, 1,000 mcg, Oral, Daily, Audrene Salaam, APRN - CNP, 1,000 mcg at 07/11/22 0855    HYDROcodone-acetaminophen (NORCO) 5-325 MG per tablet 1 tablet, 1 tablet, Oral, Q12H PRN, Audrene Salaam, APRN - CNP    lactobacillus (CULTURELLE) capsule 1 capsule, 1 capsule, Oral, Daily, Audrene Salaam, APRN - CNP, 1 capsule at 07/11/22 0854    latanoprost (XALATAN) 0.005 % ophthalmic solution 1 drop, 1 drop, Both Eyes, Nightly, Audrene Salaam, APRN - CNP, 1 drop at 07/10/22 2025    vitamin B-12 (CYANOCOBALAMIN) tablet 1,000 mcg, 1,000 mcg, Oral, Daily, Audrene Salaam, APRN - CNP, 1,000 mcg at 07/11/22 0855    vitamin B-6 (PYRIDOXINE) tablet 100 mg, 100 mg, Oral, Daily, Audrene Salaam, APRN - CNP, 100 mg at 07/11/22 0854    brimonidine (ALPHAGAN) 0.2 % ophthalmic solution 1 drop, 1 drop, Both Eyes, BID, 1 drop at 07/11/22 0856 **AND** timolol (TIMOPTIC) 0.5 % ophthalmic solution 1 drop, 1 drop, Both Eyes, BID, Kamari Toledom, APRN - CNP, 1 drop at 07/11/22 0856   atorvastatin (LIPITOR) tablet 20 mg, 20 mg, Oral, Nightly, ABBY Andrade - CNP, 20 mg at 07/10/22 2024    metoprolol tartrate (LOPRESSOR) tablet 25 mg, 25 mg, Oral, BID, Heriberto Barboza MD, 25 mg at 07/11/22 0855    cetirizine (ZYRTEC) tablet 5 mg, 5 mg, Oral, Daily, Rani Mcnulty MD, 5 mg at 07/11/22 0855    Physical Exam:  BP (!) 155/70   Pulse 79   Temp 98.1 °F (36.7 °C) (Oral)   Resp 18   Ht 5' 10\" (1.778 m)   Wt 161 lb (73 kg)   SpO2 97%   BMI 23.10 kg/m²   Wt Readings from Last 3 Encounters:   07/10/22 161 lb (73 kg)   05/06/22 156 lb 12 oz (71.1 kg)   04/07/22 163 lb (73.9 kg)     Appearance: Awake, alert, no acute respiratory distress  Skin: Intact, no rash  Head: Normocephalic, atraumatic  Eyes: EOMI, no conjunctival erythema  ENMT: No pharyngeal erythema, MMM, no rhinorrhea  Neck: Supple, positive hepatojugular reflux. , no carotid bruits  Lungs: Clear to auscultation bilaterally. No wheezes, rales, or rhonchi. Cardiac: PMI nondisplaced, Regular rhythm with a normal rate, S1 & S2 normal, no murmurs  Abdomen: Soft, nontender, +bowel sounds  Extremities: Moves all extremities x 4, no lower extremity edema  Neurologic: No focal motor deficits apparent, normal mood and affect  Peripheral Pulses: Intact posterior tibial pulses bilaterally    Intake/Output:  No intake or output data in the 24 hours ending 07/11/22 1454  No intake/output data recorded.     Laboratory Tests:  Recent Labs     07/09/22  2014 07/10/22  0848 07/11/22  1055    137 136   K 4.3 4.1 4.7   CL 98 104 101   CO2 18* 21* 22   BUN 25* 21 25*   CREATININE 1.3* 1.1 1.3*   GLUCOSE 138* 116* 118*   CALCIUM 8.8 8.9 9.2     Lab Results   Component Value Date/Time    MG 1.9 07/10/2022 08:48 AM     Recent Labs     07/10/22  0848   ALKPHOS 113   ALT 30   AST 71*   PROT 6.1*   BILITOT 0.6   LABALBU 3.3*     Recent Labs     07/09/22  2014 07/10/22  0835   WBC 10.9 9.0   RBC 3.60* 3.73*   HGB 11.3* 11.6*   HCT 34.1* 35.3*   MCV 94.7 94.6   MCH 31.4 31.1   MCHC 33.1 32.9   RDW 15.7* 15.8*    145   MPV 10.4 10.8     No results found for: CKTOTAL, CKMB, CKMBINDEX, TROPONINI  Lab Results   Component Value Date    INR 1.0 03/11/2022    PROTIME 11.5 03/11/2022     No results found for: TSHFT4, TSH  Lab Results   Component Value Date    LABA1C 5.8 (H) 05/06/2022     No results found for: EAG  Lab Results   Component Value Date    CHOL 106 05/05/2022     Lab Results   Component Value Date    TRIG 77 05/05/2022     Lab Results   Component Value Date    HDL 49 05/05/2022     Lab Results   Component Value Date    LDLCALC 42 05/05/2022     Lab Results   Component Value Date    LABVLDL 15 05/05/2022     No results found for: [facility]  Recent Labs     07/10/22  0848   PROBNP 5,794*       Cardiac Tests:    Last Echocardiogram: An echocardiogram of June, 2016 from [place] [address] demonstrate evidence of a normal-sized left ventricular chamber with mild concentric left ventricular hypertrophy and normal left ventricular systolic function with mild left atrial enlargement.   Normal function of an aortic bioprosthesis and mild mitral leaflet thickening and annular calcification with mild mitral regurgitation  Last cardiac catheterization: Coronary angiography of April, 2014 prior to aortic valve intervention demonstrated evidence of extensive multivessel disease inclusive of an [de-identified] to 90% stenosis of the distal left main trunk with an 80% proximal left anterior descending stenosis and 50% stenosis within the mid circumflex with occlusion of a circumflex marginal branch and occlusion of the right coronary artery with a patent left internal mammary graft to the left anterior descending and saphenous vein grafts to the first and second circumflex marginal branch with 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 Mild asymmetric septal hypertrophy. Septal motion consistent with post cardiac surgery. Regional wall motion abnormality with mild hypokinesis of basilar inferior   and inferolateral segment. Mildly reduced left ventricular systolic dysfunction. The left atrium is mildly dilated. Focal calcification mitral valve leaflets. Moderate mitral annular calcification. Mild mitral regurgitation is present. Normally functioning bioprosthetic valve in aortic position. Mild aortic regurgitation is noted. Mild tricuspid regurgitation.  ----------------------------------------------------------------------------------------------------------------------------------------------------------------  IMPRESSION:  1. Non-STEMI  2. Ischemic cardiomyopathy  3. Acute decompensated systolic heart failure: Hypervolemic on examination  4. Aortic stenosis status postsurgical AVR  5. Hypertension  6. Hyperlipidemia  7. Chronic renal failure    RECOMMENDATIONS:     He appears hypervolemic on examination. I will start him on Lasix 40 IV twice daily.  We will continue heparin for 1 more day to complete a total of 72 hours. Continue aspirin Lopressor and Lipitor. Patient does not want any interventions at this point.  Surgical aortic valve appears stable with acceptable function.  Hopefully discharge in the next day or so. Johnna Olea MD, [facility]    NOTE: This report was transcribed using voice recognition software. Every effort was made to ensure accuracy; however, inadvertent computerized transcription errors may be present.

## 2022-07-11 NOTE — CARE COORDINATION
Spoke with patient's son- he confirmed that he spoke with his father and that the discharge plan is [facility]. They would like the patient to transition to [facility] upon discharge.      Referral made to [facility] at [facility]- await response

## 2022-07-11 NOTE — CARE COORDINATION
Social Work discharge planning   Sw met with pt, who said his pan is to return to [facility]. Pt said he does not need SNF nor HHC at [facility]. He said he already has a ww. Pt's PT [facility] was 14/24 today. Sw called son Kimmy Butler at [phone], but was only able to leave him message to call Sw back re: pt's discharge plan. Trent Stockton with [facility] at [facility] advised they spoke to pt's family, who is requesting [address] (pt there in past). Await son's call back to confirm and will need son to talk to pt about this plan.   Electronically signed by Valeria Núñez on 7/11/2022 at 11:22 AM     Addendum-    Finn spoke to son Kimmy Butler, who said he will be here to talk to pt this afternoon about PT and going to snf short term again at [employer] signed by CLEVELAND Oquendo on 7/11/2022 at 12:00 PM

## 2022-07-12 LAB
ANION GAP SERPL CALCULATED.3IONS-SCNC: 14 MMOL/L (ref 7–16)
APTT: 60.4 SEC (ref 24.5–35.1)
BUN BLDV-MCNC: 29 MG/DL (ref 6–23)
CALCIUM SERPL-MCNC: 8.8 MG/DL (ref 8.6–10.2)
CHLORIDE BLD-SCNC: 99 MMOL/L (ref 98–107)
CO2: 20 MMOL/L (ref 22–29)
CREAT SERPL-MCNC: 1.5 MG/DL (ref 0.7–1.2)
EKG ATRIAL RATE: 67 BPM
EKG P AXIS: 21 DEGREES
EKG P-R INTERVAL: 156 MS
EKG Q-T INTERVAL: 430 MS
EKG QRS DURATION: 88 MS
EKG QTC CALCULATION (BAZETT): 454 MS
EKG R AXIS: -5 DEGREES
EKG T AXIS: 16 DEGREES
EKG VENTRICULAR RATE: 67 BPM
GFR AFRICAN AMERICAN: 53
GFR NON-AFRICAN AMERICAN: 44 ML/MIN/1.73
GLUCOSE BLD-MCNC: 117 MG/DL (ref 74–99)
HCT VFR BLD CALC: 34.9 % (ref 37–54)
HEMOGLOBIN: 11.6 G/DL (ref 12.5–16.5)
MCH RBC QN AUTO: 30.9 PG (ref 26–35)
MCHC RBC AUTO-ENTMCNC: 33.2 % (ref 32–34.5)
MCV RBC AUTO: 93.1 FL (ref 80–99.9)
PDW BLD-RTO: 15.3 FL (ref 11.5–15)
PLATELET # BLD: 150 E9/L (ref 130–450)
PMV BLD AUTO: 11.2 FL (ref 7–12)
POTASSIUM SERPL-SCNC: 3.8 MMOL/L (ref 3.5–5)
PRO-BNP: 4723 PG/ML (ref 0–450)
RBC # BLD: 3.75 E12/L (ref 3.8–5.8)
SODIUM BLD-SCNC: 133 MMOL/L (ref 132–146)
URINE CULTURE, ROUTINE: NORMAL
WBC # BLD: 9.6 E9/L (ref 4.5–11.5)

## 2022-07-12 PROCEDURE — 83880 ASSAY OF NATRIURETIC PEPTIDE: CPT

## 2022-07-12 PROCEDURE — 99232 SBSQ HOSP IP/OBS MODERATE 35: CPT | Performed by: INTERNAL MEDICINE

## 2022-07-12 PROCEDURE — 36415 COLL VENOUS BLD VENIPUNCTURE: CPT

## 2022-07-12 PROCEDURE — 99233 SBSQ HOSP IP/OBS HIGH 50: CPT | Performed by: INTERNAL MEDICINE

## 2022-07-12 PROCEDURE — 80048 BASIC METABOLIC PNL TOTAL CA: CPT

## 2022-07-12 PROCEDURE — 6370000000 HC RX 637 (ALT 250 FOR IP): Performed by: NURSE PRACTITIONER

## 2022-07-12 PROCEDURE — 6370000000 HC RX 637 (ALT 250 FOR IP): Performed by: INTERNAL MEDICINE

## 2022-07-12 PROCEDURE — 2060000000 HC ICU INTERMEDIATE R&B

## 2022-07-12 PROCEDURE — 2580000003 HC RX 258: Performed by: INTERNAL MEDICINE

## 2022-07-12 PROCEDURE — 85730 THROMBOPLASTIN TIME PARTIAL: CPT

## 2022-07-12 PROCEDURE — 85027 COMPLETE CBC AUTOMATED: CPT

## 2022-07-12 PROCEDURE — 2580000003 HC RX 258: Performed by: NURSE PRACTITIONER

## 2022-07-12 PROCEDURE — 6360000002 HC RX W HCPCS: Performed by: EMERGENCY MEDICINE

## 2022-07-12 PROCEDURE — 6360000002 HC RX W HCPCS: Performed by: INTERNAL MEDICINE

## 2022-07-12 RX ORDER — 0.9 % SODIUM CHLORIDE 0.9 %
500 INTRAVENOUS SOLUTION INTRAVENOUS ONCE
Status: COMPLETED | OUTPATIENT
Start: 2022-07-12 | End: 2022-07-12

## 2022-07-12 RX ADMIN — SODIUM CHLORIDE 500 ML: 9 INJECTION, SOLUTION INTRAVENOUS at 13:07

## 2022-07-12 RX ADMIN — TIMOLOL MALEATE 1 DROP: 5 SOLUTION OPHTHALMIC at 09:27

## 2022-07-12 RX ADMIN — FAMOTIDINE 20 MG: 20 TABLET ORAL at 09:24

## 2022-07-12 RX ADMIN — ACETAMINOPHEN 650 MG: 325 TABLET ORAL at 19:04

## 2022-07-12 RX ADMIN — ASPIRIN 81 MG CHEWABLE TABLET 81 MG: 81 TABLET CHEWABLE at 09:24

## 2022-07-12 RX ADMIN — ACETAMINOPHEN 650 MG: 325 TABLET ORAL at 05:45

## 2022-07-12 RX ADMIN — METOPROLOL TARTRATE 25 MG: 25 TABLET, FILM COATED ORAL at 09:24

## 2022-07-12 RX ADMIN — FUROSEMIDE 40 MG: 10 INJECTION, SOLUTION INTRAMUSCULAR; INTRAVENOUS at 09:27

## 2022-07-12 RX ADMIN — Medication 100 MG: at 09:24

## 2022-07-12 RX ADMIN — METOPROLOL TARTRATE 25 MG: 25 TABLET, FILM COATED ORAL at 20:42

## 2022-07-12 RX ADMIN — ACETAMINOPHEN 650 MG: 325 TABLET ORAL at 14:26

## 2022-07-12 RX ADMIN — SODIUM CHLORIDE, PRESERVATIVE FREE 10 ML: 5 INJECTION INTRAVENOUS at 09:29

## 2022-07-12 RX ADMIN — Medication 1 CAPSULE: at 09:24

## 2022-07-12 RX ADMIN — CETIRIZINE HYDROCHLORIDE 5 MG: 10 TABLET, FILM COATED ORAL at 09:25

## 2022-07-12 RX ADMIN — BRIMONIDINE TARTRATE 1 DROP: 2 SOLUTION OPHTHALMIC at 09:27

## 2022-07-12 RX ADMIN — TIMOLOL MALEATE 1 DROP: 5 SOLUTION OPHTHALMIC at 20:43

## 2022-07-12 RX ADMIN — CYANOCOBALAMIN TAB 1000 MCG 1000 MCG: 1000 TAB at 09:24

## 2022-07-12 RX ADMIN — SODIUM CHLORIDE, PRESERVATIVE FREE 10 ML: 5 INJECTION INTRAVENOUS at 20:43

## 2022-07-12 RX ADMIN — HEPARIN SODIUM 12 UNITS/KG/HR: 10000 INJECTION, SOLUTION INTRAVENOUS at 13:57

## 2022-07-12 RX ADMIN — ATORVASTATIN CALCIUM 20 MG: 20 TABLET, FILM COATED ORAL at 20:42

## 2022-07-12 RX ADMIN — Medication 2000 UNITS: at 09:24

## 2022-07-12 RX ADMIN — FOLIC ACID 1000 MCG: 1 TABLET ORAL at 09:25

## 2022-07-12 ASSESSMENT — PAIN SCALES - GENERAL
PAINLEVEL_OUTOF10: 0
PAINLEVEL_OUTOF10: 3
PAINLEVEL_OUTOF10: 0
PAINLEVEL_OUTOF10: 3
PAINLEVEL_OUTOF10: 3
PAINLEVEL_OUTOF10: 0

## 2022-07-12 ASSESSMENT — PAIN DESCRIPTION - LOCATION
LOCATION: OTHER (COMMENT)
LOCATION: KNEE
LOCATION: GENERALIZED

## 2022-07-12 ASSESSMENT — PAIN DESCRIPTION - DESCRIPTORS
DESCRIPTORS: DISCOMFORT
DESCRIPTORS: ACHING
DESCRIPTORS: ACHING

## 2022-07-12 ASSESSMENT — PAIN DESCRIPTION - ORIENTATION: ORIENTATION: RIGHT

## 2022-07-12 NOTE — PROGRESS NOTES
INPATIENT CARDIOLOGY FOLLOW-UP    Name: Jaron Whyte    Age: 80 y.o. Date of Admission: 7/9/2022  7:14 PM    Date of Service: 7/12/2022    [facility]    Chief Complaint: Follow-up for non-STEMI, decompensated heart failure    Interim History:  No new overnight cardiac complaints. Currently with no complaints of CP, SOB, palpitations, dizziness, or lightheadedness. SR on telemetry. No significant arrhythmias seen. Complaining of overall weakness. On a heparin drip. 1.3 L negative since yesterday. Breathing is improved somewhat.     Review of Systems:   Negative except as described above    Problem List:  Patient Active Problem List   Diagnosis    Subdural hematoma (HCC)    Scalp laceration    Gastroenteritis    Nausea vomiting and diarrhea    Unilateral inguinal hernia without obstruction or gangrene    Diarrhea    Primary hypertension    Pure hypercholesterolemia    Stage 3a chronic kidney disease ([facility]    S/P AVR    History of subarachnoid hemorrhage    Rotavirus enteritis    NSTEMI (non-ST elevated myocardial infarction) ([facility]    Coronary artery disease involving native coronary artery of native heart without angina pectoris    Aortic valve disease    Stage 3b chronic kidney disease ([facility]    Near syncope    Acute decompensated heart failure (HCC)       Current Medications:    Current Facility-Administered Medications:     heparin (porcine) injection 4,380 Units, 60 Units/kg, IntraVENous, PRN, Elizabeth Chua DO    heparin (porcine) injection 2,190 Units, 30 Units/kg, IntraVENous, PRN, Elizabeth Chua DO    heparin 25,000 units in dextrose 5% 250 mL (premix) infusion, 5-30 Units/kg/hr, IntraVENous, Continuous, Elizabeth Chua DO, Last Rate: 8.8 mL/hr at 07/12/22 1357, 12 Units/kg/hr at 07/12/22 1357    sodium chloride flush 0.9 % injection 5-40 mL, 5-40 mL, IntraVENous, 2 times per day, ABBY Kimble CNP, 10 mL at 07/12/22 0929    sodium chloride flush 0.9 % injection 5-40 mL, 5-40 mL, IntraVENous, PRN, Maylin Vasquez, APRN - CNP    0.9 % sodium chloride infusion, , IntraVENous, PRN, Maylin Vasquez, APRN - CNP    ondansetron (ZOFRAN-ODT) disintegrating tablet 4 mg, 4 mg, Oral, Q8H PRN **OR** ondansetron (ZOFRAN) injection 4 mg, 4 mg, IntraVENous, Q6H PRN, Maylin Vasquez APRN - CNP    acetaminophen (TYLENOL) tablet 650 mg, 650 mg, Oral, Q6H PRN, 650 mg at 07/12/22 1426 **OR** acetaminophen (TYLENOL) suppository 650 mg, 650 mg, Rectal, Q6H PRN, Maylin Vasquez APRN - CNP    polyethylene glycol (GLYCOLAX) packet 17 g, 17 g, Oral, Daily PRN, Maylin Vasquez, APRN - CNP    aspirin chewable tablet 81 mg, 81 mg, Oral, Daily, Maylin Vasquez APRN - CNP, 81 mg at 07/12/22 0924    vitamin D (CHOLECALCIFEROL) tablet 2,000 Units, 2,000 Units, Oral, Daily, Maylin Vasquez, APRN - CNP, 2,000 Units at 07/12/22 0924    famotidine (PEPCID) tablet 20 mg, 20 mg, Oral, Daily, Maylin Vasquez, APRN - CNP, 20 mg at 06/40/60 7079    folic acid (FOLVITE) tablet 1,000 mcg, 1,000 mcg, Oral, Daily, Maylin Vasquez APRN - CNP, 1,000 mcg at 07/12/22 0925    HYDROcodone-acetaminophen (NORCO) 5-325 MG per tablet 1 tablet, 1 tablet, Oral, Q12H PRN, Maylin Vasquez APRN - CNP    lactobacillus (CULTURELLE) capsule 1 capsule, 1 capsule, Oral, Daily, Maylin Vasquez APRN - CNP, 1 capsule at 07/12/22 0924    latanoprost (XALATAN) 0.005 % ophthalmic solution 1 drop, 1 drop, Both Eyes, Nightly, Maylin Vasquez APRN - CNP, 1 drop at 07/11/22 2009    vitamin B-12 (CYANOCOBALAMIN) tablet 1,000 mcg, 1,000 mcg, Oral, Daily, Maylin Vasquez APRN - CNP, 1,000 mcg at 07/12/22 0924    vitamin B-6 (PYRIDOXINE) tablet 100 mg, 100 mg, Oral, Daily, Maylin Vasquez APRN - CNP, 100 mg at 07/12/22 0924    brimonidine (ALPHAGAN) 0.2 % ophthalmic solution 1 drop, 1 drop, Both Eyes, BID, 1 drop at 07/12/22 0927 **AND** timolol (TIMOPTIC) 0.5 % ophthalmic solution 1 drop, 1 drop, Both Eyes, BID, ABBY Sanders CNP, 1 drop at 07/12/22 8817    atorvastatin (LIPITOR) tablet 20 mg, 20 mg, Oral, Nightly, ABBY Sanders CNP, 20 mg at 07/11/22 2009    metoprolol tartrate (LOPRESSOR) tablet 25 mg, 25 mg, Oral, BID, Heriberto Barboza MD, 25 mg at 07/12/22 2348    cetirizine (ZYRTEC) tablet 5 mg, 5 mg, Oral, Daily, Rani Mcnulty MD, 5 mg at 07/12/22 5500    Physical Exam:  BP (!) 116/56   Pulse 75   Temp 98 °F (36.7 °C) (Oral)   Resp 16   Ht 5' 10\" (1.778 m)   Wt 157 lb 8 oz (71.4 kg)   SpO2 93%   BMI 22.60 kg/m²   Wt Readings from Last 3 Encounters:   07/12/22 157 lb 8 oz (71.4 kg)   05/06/22 156 lb 12 oz (71.1 kg)   04/07/22 163 lb (73.9 kg)     Appearance: Awake, alert, no acute respiratory distress  Skin: Intact, no rash  Head: Normocephalic, atraumatic  Eyes: EOMI, no conjunctival erythema  ENMT: No pharyngeal erythema, MMM, no rhinorrhea  Neck: Supple, positive hepatojugular reflux. , no carotid bruits  Lungs: Clear to auscultation bilaterally. No wheezes, rales, or rhonchi. Cardiac: PMI nondisplaced, Regular rhythm with a normal rate, S1 & S2 normal, no murmurs  Abdomen: Soft, nontender, +bowel sounds  Extremities: Moves all extremities x 4, no lower extremity edema  Neurologic: No focal motor deficits apparent, normal mood and affect  Peripheral Pulses: Intact posterior tibial pulses bilaterally    Intake/Output:    Intake/Output Summary (Last 24 hours) at 7/12/2022 1741  Last data filed at 7/11/2022 2350  Gross per 24 hour   Intake --   Output 1000 ml   Net -1000 ml     No intake/output data recorded.     Laboratory Tests:  Recent Labs     07/10/22  0848 07/11/22  1055 07/12/22  0225    136 133   K 4.1 4.7 3.8    101 99   CO2 21* 22 20*   BUN 21 25* 29*   CREATININE 1.1 1.3* 1.5*   GLUCOSE 116* 118* 117*   CALCIUM 8.9 9.2 8.8     Lab Results   Component Value Date/Time    MG 1.9 07/10/2022 08:48 AM     Recent Labs     07/10/22  0848   ALKPHOS 113 ALT 30   AST 71*   PROT 6.1*   BILITOT 0.6   LABALBU 3.3*     Recent Labs     07/09/22  2014 07/10/22  0835 07/12/22 0225   WBC 10.9 9.0 9.6   RBC 3.60* 3.73* 3.75*   HGB 11.3* 11.6* 11.6*   HCT 34.1* 35.3* 34.9*   MCV 94.7 94.6 93.1   MCH 31.4 31.1 30.9   MCHC 33.1 32.9 33.2   RDW 15.7* 15.8* 15.3*    145 150   MPV 10.4 10.8 11.2     No results found for: CKTOTAL, CKMB, CKMBINDEX, TROPONINI  Lab Results   Component Value Date    INR 1.0 03/11/2022    PROTIME 11.5 03/11/2022     No results found for: TSHFT4, TSH  Lab Results   Component Value Date    LABA1C 5.8 (H) 05/06/2022     No results found for: EAG  Lab Results   Component Value Date    CHOL 106 05/05/2022     Lab Results   Component Value Date    TRIG 77 05/05/2022     Lab Results   Component Value Date    HDL 49 05/05/2022     Lab Results   Component Value Date    LDLCALC 42 05/05/2022     Lab Results   Component Value Date    LABVLDL 15 05/05/2022     No results found for: [facility]  Recent Labs     07/10/22  0848 07/12/22 0225   PROBNP 5,794* 4,723*       Cardiac Tests:    Last Echocardiogram: An echocardiogram of June, 2016 from [place] [address] demonstrate evidence of a normal-sized left ventricular chamber with mild concentric left ventricular hypertrophy and normal left ventricular systolic function with mild left atrial enlargement.   Normal function of an aortic bioprosthesis and mild mitral leaflet thickening and annular calcification with mild mitral regurgitation  Last cardiac catheterization: Coronary angiography of April, 2014 prior to aortic valve intervention demonstrated evidence of extensive multivessel disease inclusive of an [de-identified] to 90% stenosis of the distal left main trunk with an 80% proximal left anterior descending stenosis and 50% stenosis within the mid circumflex with occlusion of a circumflex marginal branch and occlusion of the right coronary artery with a patent left internal mammary graft to the left anterior descending and saphenous vein grafts to the first and second circumflex marginal branch with 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 Mild asymmetric septal hypertrophy. Septal motion consistent with post cardiac surgery. Regional wall motion abnormality with mild hypokinesis of basilar inferior   and inferolateral segment. Mildly reduced left ventricular systolic dysfunction. The left atrium is mildly dilated. Focal calcification mitral valve leaflets. Moderate mitral annular calcification. Mild mitral regurgitation is present. Normally functioning bioprosthetic valve in aortic position. Mild aortic regurgitation is noted. Mild tricuspid regurgitation.  ----------------------------------------------------------------------------------------------------------------------------------------------------------------  IMPRESSION:  1. Non-STEMI  2. Ischemic cardiomyopathy  3. Acute decompensated systolic heart failure: Hypervolemic on examination  4. Aortic stenosis status postsurgical AVR  5. Hypertension  6. Hyperlipidemia  7. Chronic renal failure    RECOMMENDATIONS:     He is euvolemic on examination. Renal function slightly worse today. We will continue IV Lasix. Start oral Lasix 20 mg daily from tomorrow   He has completed 72 hours with heparin. Can be discontinued. Continue aspirin Lopressor and Lipitor. Patient does not want any interventions at this point.  Surgical aortic valve appears stable with acceptable function.  Hopefully discharge in the next day or so.  He is overall stable from a cardiac standpoint. Will likely be discharged to a facility.    He should follow-up with his primary cardiologist at [facility] in 1 to 2 weeks after

## 2022-07-12 NOTE — CARE COORDINATION
Social Work discharge [address] done and copy in envelope in folder. [place] forms for [facility] also in envelope for discharge transport.   Electronically signed by Rohit Marin on 7/12/2022 at 2:07 PM

## 2022-07-12 NOTE — CARE COORDINATION
Discharge plan remains [address] [place] [address] started 7/48. Spoke with Olivia at [facility]- she confirmed that [place] remains pending. Await precert. Will need negative covid on day of discharge. Son updated.

## 2022-07-12 NOTE — PLAN OF CARE
Patient's chart updated to reflect:      . - HF care plan, HF education points and HF discharge instructions.  -Orders: 2 gram sodium diet, daily weights, I/O.  -PCP and cardiology follow up appointments to be scheduled within 7 days of hospital discharge. -CHF education session will be provided to the patient prior to hospital discharge.     Jung Castro RN BSN  Heart Failure Navigator

## 2022-07-12 NOTE — PLAN OF CARE
Problem: Safety - Adult  Goal: Free from fall injury  Outcome: Progressing  Flowsheets (Taken 7/11/2022 2206 by Abbie Hughes RN)  Free From Fall Injury: Instruct family/caregiver on patient safety     Problem: ABCDS Injury Assessment  Goal: Absence of physical injury  Outcome: Progressing  Flowsheets (Taken 7/11/2022 2206 by Abbie Hughes RN)  Absence of Physical Injury: Implement safety measures based on patient assessment     Problem: Pain  Goal: Verbalizes/displays adequate comfort level or baseline comfort level  Outcome: Progressing

## 2022-07-12 NOTE — PLAN OF CARE
Problem: Safety - Adult  Goal: Free from fall injury  Recent Flowsheet Documentation  Taken 7/11/2022 2206 by Edil Chapa RN  Free From Fall Injury: Instruct family/caregiver on patient safety  7/11/2022 1809 by Dale Elizabeth RN  Outcome: Progressing  7/11/2022 0949 by Jung Perry RN  Outcome: Progressing     Problem: ABCDS Injury Assessment  Goal: Absence of physical injury  Recent Flowsheet Documentation  Taken 7/11/2022 2206 by Edil Chapa RN  Absence of Physical Injury: Implement safety measures based on patient assessment  7/11/2022 1809 by Dale Elizabeth RN  Outcome: Progressing  7/11/2022 0949 by Jung Perry RN  Outcome: Progressing     Problem: Pain  Goal: Verbalizes/displays adequate comfort level or baseline comfort level  7/11/2022 1809 by Dale Elizabeth RN  Outcome: Progressing  7/11/2022 0949 by Jung Perry RN  Outcome: Progressing     Problem: Skin/Tissue Integrity  Goal: Absence of new skin breakdown  Description: 1. Monitor for areas of redness and/or skin breakdown  2. Assess vascular access sites hourly  3. Every 4-6 hours minimum:  Change oxygen saturation probe site  4. Every 4-6 hours:  If on nasal continuous positive airway pressure, respiratory therapy assess nares and determine need for appliance change or resting period.   7/11/2022 1809 by Dale Elizabeth RN  Outcome: Progressing  7/11/2022 0949 by Jung Perry RN  Outcome: Progressing

## 2022-07-12 NOTE — CONSULTS
Patient currently admitted with diagnosis of Diastolic heart failure. Patient was awake and alert, laying in bed during the consultation. He was engaged and asked appropriate questions throughout the education session. He is agreeable to heart failure education and self monitoring after discharge. Scheduling with the CHF clinic No: He is not interested at this time. .    Future Appointments   Date Time Provider Cayla Helmi   7/22/2022  9:30 AM [facility]ilar[facility] - [facility]            We reviewed the introduction to Heart Failure, the HF zones, signs and symptoms to report on day 1 of onset, medications, medication compliance, the importance of obtaining daily weights, following a low sodium diet, reading food labels for the sodium content, keeping physician appointments, and smoking cessation. We discussed writing / tracking daily weights on a calendar / log because a 5 pound gain in 1 week can sneak up if you are not tracking it. Contributing risk factors for Heart Failure are identified as activity intolerance . I advised patient they can reduce the risk for Heart Failure exacerbations by modifying / controlling the risk factors. We discussed self-managed care which includes the following:  to take medications as prescribed, report any intolerable side effects of medications to the cardiologist / doctor, do not just stop taking the medication; follow a cardiac heart healthy / low sodium diet; weigh yourself daily, exercise regularly- per doctor recommendation and not to smoke or use an excess amount of alcohol. We discussed calling the cardiologist / doctor with a weight gain of 3 pounds in one day or a total of 5 pounds or more in one week. Also, if you should have a significant weight loss of 3# or more in one day to call the doctor, they may need to decrease or hold the diuretic dose.  On days you feels nauseated and not eating / drinking, having emesis or diarrhea,  informed to call the cardiologist  / doctor, they may need to decrease or hold diuretic to avoid dehydration. I stressed the importance of informing their cardiologist the first day of onset of any of the signs and symptoms in the \"Yellow Zone\" of the HF Zones. Patient verbalizes understanding. Greater than 30 minutes was spent educating patient. The Heart Failure Booklet given to the patient with additional patient education addressing:  · What is Heart Failure? · Things You Can Do to Live Well with HF  · How to Take Your Medications  · How to Eat Less Salt  · Mobile its Salt? · Exercising Well with Heart Failure  · Signs and symptoms of HF to report  · Weight Yourself Each Day  · Home Self Management- activity, weight tracking, taking medications as prescribed, meals /diet planning (sodium and fluid restriction), how to read food labels, keeping physician follow ups, smoking cessation, follow the Heart Failure Zones  · The Heart Failure zones  · Every Dose Every Day    Instructed  to call 911 if you have any of the following symptoms:    ·    Struggling to breathe unrelieved with rest,  ·    Having chest pain  ·    Have confusion or cant think clearly      The patient is ordered:  Diet: ADULT DIET; Regular; Low Fat/Low Chol/High Fiber/SHAWN   Sodium controlled diet Yes  Fluid restriction daily ordered (fluid restriction recommended if patient is hyponatremic and/or diuretic is initiated or increased) No  FR:   Daily Weights:   Patient Vitals for the past 96 hrs (Last 3 readings):   Weight   07/12/22 0500 157 lb 8 oz (71.4 kg)   07/10/22 0144 161 lb (73 kg)     I/O:     Intake/Output Summary (Last 24 hours) at 7/12/2022 1350  Last data filed at 7/11/2022 2350  Gross per 24 hour   Intake --   Output 1325 ml   Net -1325 ml        Lizy Cowart, RN BSN, RN  Heart Failure Navigator    CONGESTIVE HEART FAILURE (CHF) AHA GUIDELINES  (Must be completed for Primary Diagnosis CHF or History of CHF)    Discharge Plan:   I placed the Heart Failure Home Instructions in patient's discharge instructions. Per Heart Failure GWTG, the patient should have a follow-up appointment made within 7 days of discharge.     New Diagnosis No    ECHO Results most recent:  No results found for: LVEF, LVEFMODE                                     Social History     Tobacco Use   Smoking Status Never Smoker   Smokeless Tobacco Never Used        Immunization History   Administered Date(s) Administered    Td (Adult), 2 Lf Tetanus Toxoid, Pf (Td, Absorbed) 03/11/2022      Angiotensin-Converting-Enzyme (ACE) inhibitor ordered:  [] Yes  [x] No (specify contraindication):    [] Contraindicated  [] Hypotensive patient who was at immediate risk of cardiogenic shock  [] Hospitalized patient who experienced marked azotemia  [] Other Contraindications  [] Not Eligible  [] Not Tolerant  [] Patient Reason  [] System Reason  [x] Other Reason    Angiotensin II receptor blockers (ARB) ordered:  [] Yes  [x] No (specify contraindication):    [] Contraindicated  [] Hypotensive patient who was at immediate risk of cardiogenic shock  [] Hospitalized patient who experienced marked azotemia  [x] Other Contraindications    ARNI - Angiotensin Receptor Neprilysin Inhibitor ordered:  [] Yes  [x] No (specify contraindication):    [] ACE inhibitor use within the prior 36 hours  [] Allergy  [] Hyperkalemia  [] Hypotension  [] Renal dysfunction defined as creatinine > 2.5 mg/dL in men or > 2.0 mg/dL in women  [] Other Contraindications  [] Not Eligible  [] Not Tolerant  [] Patient Reason  []System Reason  [x]Other Reason    Beta Blocker (Carvedilol, Metoprolol Succinate, or Bisoprolol) ordered:    [] Yes  [x] No (specify contraindication):    [] Contraindicated  [] Asthma  [] Fluid Overload  [] Low Blood Pressure  [] Patient recently treated with an intravenous positive inotropic agent  [x] Other Contraindications  [] Not Eligible  [] Not Tolerant  [] Patient Reason  [] System Reason    SGLT2 Inhibitor ordered:  [] Yes  [x] No (specify contraindication):    [] Contraindicated  [] Patient currently on dialysis  [] Ketoacidosis  [] Known hypersensitivity to the medication  [] Type I diabetes (not approved for use in patients with Type I diabetes due to increased risk of ketoacidosis)  [] Other Contraindications  [] Not Eligible  [] Not Tolerant  [] Patient Reason  [] System Reason  [x] Other Reason    Aldosterone Antagonist ordered:  [] Yes  [x] No (specify contraindication):    [] Contraindicated  [] Allergy due to aldosterone receptor antagonist  [] Hyperkalemia  [] Renal dysfunction defined as creatinine >2.5 mg/dL in men or >2.0 mg/dL in women.   [] Other contraindications  [] Not Eligible  [] Not Tolerant  [] Patient Reason  [] System Reason  [x] Other Reason

## 2022-07-12 NOTE — PROGRESS NOTES
[facility] Hospitalist   Progress Note    Admitting Date and Time: 7/9/2022  7:14 PM  Admit Dx: NSTEMI (non-ST elevated myocardial infarction) ([facility] [address] [I21.4]  Near syncope [R55]    Subjective:    Patient was admitted with NSTEMI (non-ST elevated myocardial infarction) ([facility] [address] [I21.4]  Near syncope [R55]. Patient has done well this morning. Was sitting in chair and started to feel as if he were to have a bowel movement. He became lightheaded and had fecal incontinence while in the chair and was quickly moved to bed. Blood pressures ranged high 80s to low 066A systolic. I evaluated patient had a mild is back and he became more clear headed. Suspect vasovagal event with possibly some volume loss from diuresis yesterday. Patient was seen again 20 minutes later still did not feel himself and blood pressures were so stopped so 500 cc normal saline bolus given.      sodium chloride flush  5-40 mL IntraVENous 2 times per day    aspirin  81 mg Oral Daily    vitamin D  2,000 Units Oral Daily    famotidine  20 mg Oral Daily    folic acid  6,448 mcg Oral Daily    lactobacillus  1 capsule Oral Daily    latanoprost  1 drop Both Eyes Nightly    vitamin B-12  1,000 mcg Oral Daily    vitamin B-6  100 mg Oral Daily    brimonidine  1 drop Both Eyes BID    And    timolol  1 drop Both Eyes BID    atorvastatin  20 mg Oral Nightly    metoprolol tartrate  25 mg Oral BID    cetirizine  5 mg Oral Daily     heparin (porcine), 60 Units/kg, PRN  heparin (porcine), 30 Units/kg, PRN  sodium chloride flush, 5-40 mL, PRN  sodium chloride, , PRN  ondansetron, 4 mg, Q8H PRN   Or  ondansetron, 4 mg, Q6H PRN  acetaminophen, 650 mg, Q6H PRN   Or  acetaminophen, 650 mg, Q6H PRN  polyethylene glycol, 17 g, Daily PRN  HYDROcodone-acetaminophen, 1 tablet, Q12H PRN         Objective:    BP (!) 116/56   Pulse 75   Temp 98 °F (36.7 °C) (Oral)   Resp 16   Ht 5' 10\" (1.778 m)   Wt 157 lb 8 oz (71.4 kg)   SpO2 93% BMI 22.60 kg/m²   General Appearance: alert and oriented to person, place and time, well developed and well- nourished, in no acute distress  Skin: warm and dry, no rash or erythema  Head: normocephalic and atraumatic  Neck: supple and non-tender without mass, no thyromegaly or thyroid nodules, no cervical lymphadenopathy  Pulmonary/Chest: clear to auscultation bilaterally- no wheezes, rales or rhonchi, normal air movement, no respiratory distress  Cardiovascular: normal rate, regular rhythm, normal S1 and S2, no murmurs, rubs, clicks, or gallops, distal pulses intact, no carotid bruits  Abdomen: soft, non-tender, non-distended, normal bowel sounds, no masses or organomegaly  Extremities: no cyanosis, clubbing or edema      Recent Labs     07/10/22  0848 07/11/22  1055 07/12/22  0225    136 133   K 4.1 4.7 3.8    101 99   CO2 21* 22 20*   BUN 21 25* 29*   CREATININE 1.1 1.3* 1.5*   GLUCOSE 116* 118* 117*   CALCIUM 8.9 9.2 8.8       Recent Labs     07/09/22  2014 07/10/22  0835 07/12/22  0225   WBC 10.9 9.0 9.6   RBC 3.60* 3.73* 3.75*   HGB 11.3* 11.6* 11.6*   HCT 34.1* 35.3* 34.9*   MCV 94.7 94.6 93.1   MCH 31.4 31.1 30.9   MCHC 33.1 32.9 33.2   RDW 15.7* 15.8* 15.3*    145 150   MPV 10.4 10.8 11.2       Radiology:   XR CHEST PORTABLE   Final Result   No acute cardiopulmonary abnormality. Assessment:    Principal Problem:    NSTEMI (non-ST elevated myocardial infarction) ([facility] [address]  Active Problems:    Primary hypertension    Stage 3a chronic kidney disease (HCC)    S/P AVR    Coronary artery disease involving native coronary artery of native heart without angina pectoris    Aortic valve disease    Stage 3b chronic kidney disease (HCC)    Near syncope    Acute decompensated heart failure ([facility] [address]  Resolved Problems:    * No resolved hospital problems. *      Plan:    1. Non-ST elevated MI  Troponin has peaked at 420. Did drop after this. Patient without any cardiac symptoms.   Discussed with cardiology, continue IV heparin drip for 1 more day. Continue aspirin, Lipitor, and Lopressor. Patient has clearly stated that he has no wish to pursue any coronary intervention. 2.  Presyncope  History would suggest this is vasovagal, but patient also with MI. Echo does show inferior wall motion abnormalities. EF 45 to 50%    Today another presyncopal event with bowel movement. HR remains high 50's so he has no chronotropic response to low blood pressure. Also may have been over diuresed as Creatinine went up a little today. Received bolus would not give more fluids. Still trying for discharge to SNF for tomorrow if no more issues.     Electronically signed by Natasha Sena MD on 7/12/2022 at 4:03 PM

## 2022-07-12 NOTE — PROGRESS NOTES
P Quality Flow/Interdisciplinary Rounds Progress Note        Quality Flow Rounds held on July 12, 2022    Disciplines Attending:  Bedside Nurse,  and Nursing Unit Leadership    Sherley Reynolds was admitted on 7/9/2022  7:14 PM    Anticipated Discharge Date:       Disposition:    Gilberto Score:  Gilberto Scale Score: 17    Readmission Risk              Risk of Unplanned Readmission:  21           Discussed patient goal for the day, patient clinical progression, and barriers to discharge. The following Goal(s) of the Day/Commitment(s) have been identified:  hep gtt, IV Lasix BID.       Hillary Jarvis RN  July 12, 2022

## 2022-07-12 NOTE — PROGRESS NOTES
Patient's son Donte Alcantar) contacts this nurse for update, then states \" Let everyone know that they can call me anytime if anything happens. \" Reassured family member this would be passed on in report to oncoming nurse.

## 2022-07-13 VITALS
TEMPERATURE: 96.9 F | OXYGEN SATURATION: 96 % | BODY MASS INDEX: 22.76 KG/M2 | SYSTOLIC BLOOD PRESSURE: 114 MMHG | WEIGHT: 159 LBS | HEART RATE: 74 BPM | RESPIRATION RATE: 18 BRPM | HEIGHT: 70 IN | DIASTOLIC BLOOD PRESSURE: 68 MMHG

## 2022-07-13 LAB
ANION GAP SERPL CALCULATED.3IONS-SCNC: 13 MMOL/L (ref 7–16)
APTT: 43.3 SEC (ref 24.5–35.1)
BUN BLDV-MCNC: 36 MG/DL (ref 6–23)
CALCIUM SERPL-MCNC: 8.5 MG/DL (ref 8.6–10.2)
CHLORIDE BLD-SCNC: 100 MMOL/L (ref 98–107)
CO2: 21 MMOL/L (ref 22–29)
CREAT SERPL-MCNC: 1.6 MG/DL (ref 0.7–1.2)
GFR AFRICAN AMERICAN: 49
GFR NON-AFRICAN AMERICAN: 41 ML/MIN/1.73
GLUCOSE BLD-MCNC: 141 MG/DL (ref 74–99)
MAGNESIUM: 2 MG/DL (ref 1.6–2.6)
POTASSIUM SERPL-SCNC: 3.9 MMOL/L (ref 3.5–5)
SARS-COV-2, NAAT: NOT DETECTED
SODIUM BLD-SCNC: 134 MMOL/L (ref 132–146)

## 2022-07-13 PROCEDURE — 6370000000 HC RX 637 (ALT 250 FOR IP): Performed by: INTERNAL MEDICINE

## 2022-07-13 PROCEDURE — 36415 COLL VENOUS BLD VENIPUNCTURE: CPT

## 2022-07-13 PROCEDURE — 80048 BASIC METABOLIC PNL TOTAL CA: CPT

## 2022-07-13 PROCEDURE — 83735 ASSAY OF MAGNESIUM: CPT

## 2022-07-13 PROCEDURE — 2580000003 HC RX 258: Performed by: NURSE PRACTITIONER

## 2022-07-13 PROCEDURE — 99239 HOSP IP/OBS DSCHRG MGMT >30: CPT | Performed by: INTERNAL MEDICINE

## 2022-07-13 PROCEDURE — 85730 THROMBOPLASTIN TIME PARTIAL: CPT

## 2022-07-13 PROCEDURE — 6370000000 HC RX 637 (ALT 250 FOR IP): Performed by: NURSE PRACTITIONER

## 2022-07-13 PROCEDURE — 6360000002 HC RX W HCPCS: Performed by: EMERGENCY MEDICINE

## 2022-07-13 PROCEDURE — 87635 SARS-COV-2 COVID-19 AMP PRB: CPT

## 2022-07-13 RX ORDER — HYDROCODONE BITARTRATE AND ACETAMINOPHEN 5; 325 MG/1; MG/1
1 TABLET ORAL EVERY 12 HOURS PRN
Qty: 10 TABLET | Refills: 0 | Status: SHIPPED | OUTPATIENT
Start: 2022-07-13 | End: 2022-07-18

## 2022-07-13 RX ORDER — METOPROLOL SUCCINATE 25 MG/1
12.5 TABLET, EXTENDED RELEASE ORAL NIGHTLY
Status: DISCONTINUED | OUTPATIENT
Start: 2022-07-13 | End: 2022-07-13 | Stop reason: HOSPADM

## 2022-07-13 RX ORDER — METOPROLOL SUCCINATE 25 MG/1
12.5 TABLET, EXTENDED RELEASE ORAL NIGHTLY
Qty: 30 TABLET | Refills: 3 | DISCHARGE
Start: 2022-07-13 | End: 2022-09-08

## 2022-07-13 RX ORDER — ASPIRIN 81 MG/1
81 TABLET, CHEWABLE ORAL DAILY
Qty: 30 TABLET | Refills: 3 | DISCHARGE
Start: 2022-07-14 | End: 2022-09-08

## 2022-07-13 RX ADMIN — CETIRIZINE HYDROCHLORIDE 5 MG: 10 TABLET, FILM COATED ORAL at 08:03

## 2022-07-13 RX ADMIN — SODIUM CHLORIDE, PRESERVATIVE FREE 10 ML: 5 INJECTION INTRAVENOUS at 07:58

## 2022-07-13 RX ADMIN — BRIMONIDINE TARTRATE 1 DROP: 2 SOLUTION OPHTHALMIC at 08:03

## 2022-07-13 RX ADMIN — ASPIRIN 81 MG CHEWABLE TABLET 81 MG: 81 TABLET CHEWABLE at 08:03

## 2022-07-13 RX ADMIN — Medication 1 CAPSULE: at 08:15

## 2022-07-13 RX ADMIN — METOPROLOL TARTRATE 25 MG: 25 TABLET, FILM COATED ORAL at 08:15

## 2022-07-13 RX ADMIN — FOLIC ACID 1000 MCG: 1 TABLET ORAL at 08:03

## 2022-07-13 RX ADMIN — TIMOLOL MALEATE 1 DROP: 5 SOLUTION OPHTHALMIC at 08:03

## 2022-07-13 RX ADMIN — HEPARIN SODIUM 2190 UNITS: 1000 INJECTION INTRAVENOUS; SUBCUTANEOUS at 05:15

## 2022-07-13 RX ADMIN — Medication 100 MG: at 08:03

## 2022-07-13 RX ADMIN — FAMOTIDINE 20 MG: 20 TABLET ORAL at 08:03

## 2022-07-13 RX ADMIN — HEPARIN SODIUM 14 UNITS/KG/HR: 10000 INJECTION, SOLUTION INTRAVENOUS at 05:15

## 2022-07-13 RX ADMIN — Medication 2000 UNITS: at 08:03

## 2022-07-13 RX ADMIN — CYANOCOBALAMIN TAB 1000 MCG 1000 MCG: 1000 TAB at 08:03

## 2022-07-13 RX ADMIN — ACETAMINOPHEN 650 MG: 325 TABLET ORAL at 10:37

## 2022-07-13 ASSESSMENT — PAIN DESCRIPTION - DESCRIPTORS: DESCRIPTORS: ACHING

## 2022-07-13 ASSESSMENT — PAIN DESCRIPTION - ORIENTATION: ORIENTATION: RIGHT

## 2022-07-13 ASSESSMENT — PAIN DESCRIPTION - LOCATION: LOCATION: KNEE

## 2022-07-13 ASSESSMENT — PAIN - FUNCTIONAL ASSESSMENT: PAIN_FUNCTIONAL_ASSESSMENT: ACTIVITIES ARE NOT PREVENTED

## 2022-07-13 ASSESSMENT — PAIN SCALES - GENERAL
PAINLEVEL_OUTOF10: 4
PAINLEVEL_OUTOF10: 0

## 2022-07-13 NOTE — PROGRESS NOTES
Secure message sent to [facility] Cardiology re: discharge. Call received from Dr. Eleno Azevedo, pt okay for discharge per his POV, medications have been adjusted per her recommendations. F/U outpt in 2 weeks.

## 2022-07-13 NOTE — DISCHARGE SUMMARY
[facility] EMERGENCY SERVICE Physician Discharge Summary       [address]  [address] [address] [facility]  [address] [address] [phone]    Schedule an appointment as soon as possible for a visit in 2 weeks  for follow up appointment      Activity level: As tolerated with assistance    Diet: ADULT DIET; Regular; Low Fat/Low Chol/High Fiber/SHAWN    Labs: Follow creatinine    Dispo: Home    Condition at discharge: Stable    Patient ID:  Adriana Tsang  [id number]  5 y.o.  1/16/1930    Admit date: 7/9/2022    Discharge date and time:  7/13/2022  12:24 PM    Admission Diagnoses: Principal Problem:    NSTEMI (non-ST elevated myocardial infarction) [facility])  Active Problems:    Primary hypertension    Stage 3a chronic kidney disease (HCC)    S/P AVR    Coronary artery disease involving native coronary artery of native heart without angina pectoris    Aortic valve disease    Stage 3b chronic kidney disease ([address]    Near syncope    Acute decompensated heart failure ([facility] [address]  Resolved Problems:    * No resolved hospital problems. *      Discharge Diagnoses: Principal Problem:    NSTEMI (non-ST elevated myocardial infarction) ([address]  Active Problems:    Primary hypertension    Stage 3a chronic kidney disease (HCC)    S/P AVR    Coronary artery disease involving native coronary artery of native heart without angina pectoris    Aortic valve disease    Stage 3b chronic kidney disease (HCC)    Near syncope    Acute decompensated heart failure ([address]  Resolved Problems:    * No resolved hospital problems. *      Consults:  IP CONSULT TO CARDIOLOGY  IP CONSULT TO HEART FAILURE NURSE/COORDINATOR    Procedures: None    Hospital Course: Patient was admitted with NSTEMI (non-ST elevated myocardial infarction) ([address] [I21.4]  Near syncope [R55].    66-year-old male with history of coronary artery disease status postcoronary to bypass grafting, aortic valve replacement, chronic kidney disease, hypertension. Patient presents to the hospital after having a presyncopal experience shortly after bowel movement and then standing at the sink to wash up. However he also complained of being short of breath and was brought to the emergency room where he was found to have a non-ST elevation MI by enzymes. He was started on heparin drip and this was continued for 72 hours. He was evaluated by cardiology, and echocardiogram was done showing slightly diminished EF with regional wall motion abnormality with mild hypokinesis of the basilar inferior and inferolateral segment. Patient was completely chest pain-free during his hospitalization. Cardiology did want diuresis and this led to a slight worsening in creatinine to 1.5 and then 1.6 by the time of discharge. The day before discharge patient again had an episode of presyncope while having a bowel movement that was incontinent. Blood pressure was around 391 systolic and he was given back 500 cc bolus of saline. The following day patient was sitting upright and had no symptoms whatsoever. He was felt to be a good candidate for rehab and was discharged to rehab in good condition.     Discharge Exam:  Vitals:    07/12/22 2037 07/13/22 0035 07/13/22 0218 07/13/22 0745   BP: 110/63  121/62 114/68   Pulse: 72  69 74   Resp: 16  16 18   Temp: 98.1 °F (36.7 °C)  97.9 °F (36.6 °C) 96.9 °F (36.1 °C)   TempSrc: Oral  Oral Oral   SpO2: 96%  95% 96%   Weight:  159 lb (72.1 kg)     Height:         General Appearance: alert and oriented to person, place and time, well developed and well- nourished, in no acute distress  Skin: warm and dry, no rash or erythema  Head: normocephalic and atraumatic  Neck: supple and non-tender without mass, no thyromegaly or thyroid nodules, no cervical lymphadenopathy  Pulmonary/Chest: clear to auscultation bilaterally- no wheezes, rales or rhonchi, normal air movement, no respiratory Vitamin D3 50 MCG (2000 UT) Caps        STOP taking these medications    metoprolol tartrate 25 MG tablet  Commonly known as: LOPRESSOR           Where to Get Your Medications      You can get these medications from any pharmacy    Bring a paper prescription for each of these medications  · HYDROcodone-acetaminophen 5-325 MG per tablet     Information about where to get these medications is not yet available    Ask your nurse or doctor about these medications  · aspirin 81 MG chewable tablet  · metoprolol succinate 25 MG extended release tablet           Note that more than 30 minutes was spent in preparing discharge papers, discussing discharge with patient, medication review, etc.    Signed:  Electronically signed by Ruthie Mehta MD on 7/13/2022 at 12:24 PM    NOTE: This report was transcribed using voice recognition software. Every effort was made to ensure accuracy; however, inadvertent computerized transcription errors may be present.

## 2022-07-13 NOTE — CARE COORDINATION
Per Genevieve Conti at [facility] has been relieved. Pt needs negative Covid prior to d/c. Genevieve Conti will set up transport await transport time. Charge RN updated.     Chris Conti at [facility] will P/U pt at 2pm. VM left with son

## 2022-07-13 NOTE — PROGRESS NOTES
P Quality Flow/Interdisciplinary Rounds Progress Note        Quality Flow Rounds held on July 13, 2022    Disciplines Attending:  Bedside Nurse, ,  and Nursing Unit Leadership    Annalisa Solitario was admitted on 7/9/2022  7:14 PM    Anticipated Discharge Date:       Disposition:    Gilberto Score:  Gilberto Scale Score: 13    Readmission Risk              Risk of Unplanned Readmission:  21           Discussed patient goal for the day, patient clinical progression, and barriers to discharge.   The following Goal(s) of the Day/Commitment(s) have been identified:  discharge to facility today      Cecilio Archibald RN  July 13, 2022

## 2022-07-13 NOTE — PLAN OF CARE
Problem: Safety - Adult  Goal: Free from fall injury  Outcome: Progressing  Goal: Absence of falls  Outcome: Progressing

## 2022-09-08 ENCOUNTER — APPOINTMENT (OUTPATIENT)
Dept: CT IMAGING | Age: 87
DRG: 199 | End: 2022-09-08
Payer: MEDICARE

## 2022-09-08 ENCOUNTER — HOSPITAL ENCOUNTER (INPATIENT)
Age: 87
LOS: 2 days | Discharge: HOME | DRG: 199 | End: 2022-09-10
Attending: EMERGENCY MEDICINE | Admitting: INTERNAL MEDICINE
Payer: MEDICARE

## 2022-09-08 ENCOUNTER — APPOINTMENT (OUTPATIENT)
Dept: GENERAL RADIOLOGY | Age: 87
DRG: 199 | End: 2022-09-08
Payer: MEDICARE

## 2022-09-08 DIAGNOSIS — J93.9 PNEUMOTHORAX ON RIGHT: ICD-10-CM

## 2022-09-08 DIAGNOSIS — R07.9 CHEST PAIN, UNSPECIFIED TYPE: Primary | ICD-10-CM

## 2022-09-08 PROBLEM — E78.5 HYPERLIPIDEMIA: Status: ACTIVE | Noted: 2022-09-08

## 2022-09-08 PROBLEM — J94.8 HYDROPNEUMOTHORAX: Status: ACTIVE | Noted: 2022-09-08

## 2022-09-08 PROBLEM — J15.9 BACTERIAL PNEUMONIA: Status: ACTIVE | Noted: 2022-09-08

## 2022-09-08 LAB
ADENOVIRUS BY PCR: NOT DETECTED
ANION GAP SERPL CALCULATED.3IONS-SCNC: 8 MMOL/L (ref 7–16)
APPEARANCE FLUID: NORMAL
BASOPHILS ABSOLUTE: 0.09 E9/L (ref 0–0.2)
BASOPHILS RELATIVE PERCENT: 1 % (ref 0–2)
BORDETELLA PARAPERTUSSIS BY PCR: NOT DETECTED
BORDETELLA PERTUSSIS BY PCR: NOT DETECTED
BUN BLDV-MCNC: 23 MG/DL (ref 6–23)
C-REACTIVE PROTEIN: 3.7 MG/DL (ref 0–0.4)
CALCIUM SERPL-MCNC: 8.4 MG/DL (ref 8.6–10.2)
CELL COUNT FLUID TYPE: NORMAL
CHLAMYDOPHILIA PNEUMONIAE BY PCR: NOT DETECTED
CHLORIDE BLD-SCNC: 102 MMOL/L (ref 98–107)
CO2: 24 MMOL/L (ref 22–29)
COLOR FLUID: YELLOW
CORONAVIRUS 229E BY PCR: NOT DETECTED
CORONAVIRUS HKU1 BY PCR: NOT DETECTED
CORONAVIRUS NL63 BY PCR: NOT DETECTED
CORONAVIRUS OC43 BY PCR: NOT DETECTED
CREAT SERPL-MCNC: 1.4 MG/DL (ref 0.7–1.2)
CRITICAL: NORMAL
DATE ANALYZED: NORMAL
DATE OF COLLECTION: NORMAL
EKG ATRIAL RATE: 65 BPM
EKG P AXIS: 19 DEGREES
EKG P-R INTERVAL: 170 MS
EKG Q-T INTERVAL: 440 MS
EKG QRS DURATION: 74 MS
EKG QTC CALCULATION (BAZETT): 457 MS
EKG R AXIS: -11 DEGREES
EKG T AXIS: 48 DEGREES
EKG VENTRICULAR RATE: 65 BPM
EOSINOPHILS ABSOLUTE: 1.02 E9/L (ref 0.05–0.5)
EOSINOPHILS RELATIVE PERCENT: 11.5 % (ref 0–6)
FLUID TYPE: NORMAL
GFR AFRICAN AMERICAN: 57
GFR NON-AFRICAN AMERICAN: 47 ML/MIN/1.73
GLUCOSE BLD-MCNC: 135 MG/DL (ref 74–99)
GLUCOSE, FLUID: 120 MG/DL
HCT VFR BLD CALC: 30.9 % (ref 37–54)
HEMOGLOBIN: 10.1 G/DL (ref 12.5–16.5)
HUMAN METAPNEUMOVIRUS BY PCR: NOT DETECTED
HUMAN RHINOVIRUS/ENTEROVIRUS BY PCR: NOT DETECTED
IMMATURE GRANULOCYTES #: 0.03 E9/L
IMMATURE GRANULOCYTES %: 0.3 % (ref 0–5)
INFLUENZA A BY PCR: NOT DETECTED
INFLUENZA B BY PCR: NOT DETECTED
LAB: NORMAL
LACTATE DEHYDROGENASE: 321 U/L (ref 135–225)
LD, FLUID: 530 U/L
LYMPHOCYTES ABSOLUTE: 1.72 E9/L (ref 1.5–4)
LYMPHOCYTES RELATIVE PERCENT: 19.4 % (ref 20–42)
Lab: NORMAL
Lab: NORMAL
MCH RBC QN AUTO: 30.7 PG (ref 26–35)
MCHC RBC AUTO-ENTMCNC: 32.7 % (ref 32–34.5)
MCV RBC AUTO: 93.9 FL (ref 80–99.9)
MONOCYTE, FLUID: 67 %
MONOCYTES ABSOLUTE: 0.94 E9/L (ref 0.1–0.95)
MONOCYTES RELATIVE PERCENT: 10.6 % (ref 2–12)
MYCOPLASMA PNEUMONIAE BY PCR: NOT DETECTED
NEUTROPHIL, FLUID: 33 %
NEUTROPHILS ABSOLUTE: 5.07 E9/L (ref 1.8–7.3)
NEUTROPHILS RELATIVE PERCENT: 57.2 % (ref 43–80)
NUCLEATED CELLS FLUID: 7844 /UL
OPERATOR ID: NORMAL
PARAINFLUENZA VIRUS 1 BY PCR: NOT DETECTED
PARAINFLUENZA VIRUS 2 BY PCR: NOT DETECTED
PARAINFLUENZA VIRUS 3 BY PCR: NOT DETECTED
PARAINFLUENZA VIRUS 4 BY PCR: NOT DETECTED
PDW BLD-RTO: 14.2 FL (ref 11.5–15)
PH FLUID: 7.39
PLATELET # BLD: 226 E9/L (ref 130–450)
PMV BLD AUTO: 10.2 FL (ref 7–12)
POTASSIUM SERPL-SCNC: 4.4 MMOL/L (ref 3.5–5)
PRO-BNP: 7870 PG/ML (ref 0–450)
PROCALCITONIN: 0.04 NG/ML (ref 0–0.08)
PROTEIN FLUID: 3.3 G/DL
RBC # BLD: 3.29 E12/L (ref 3.8–5.8)
RBC FLUID: 2000 /UL
RESPIRATORY SYNCYTIAL VIRUS BY PCR: NOT DETECTED
SARS-COV-2, PCR: NOT DETECTED
SODIUM BLD-SCNC: 134 MMOL/L (ref 132–146)
SOURCE, BLOOD GAS: NORMAL
TIME ANALYZED: 1447
TRIGLYCERIDES FLUID: 14 MG/DL
TROPONIN, HIGH SENSITIVITY: 44 NG/L (ref 0–11)
TROPONIN, HIGH SENSITIVITY: 44 NG/L (ref 0–11)
WBC # BLD: 8.9 E9/L (ref 4.5–11.5)

## 2022-09-08 PROCEDURE — 84484 ASSAY OF TROPONIN QUANT: CPT

## 2022-09-08 PROCEDURE — 89051 BODY FLUID CELL COUNT: CPT

## 2022-09-08 PROCEDURE — 36415 COLL VENOUS BLD VENIPUNCTURE: CPT

## 2022-09-08 PROCEDURE — 0W993ZZ DRAINAGE OF RIGHT PLEURAL CAVITY, PERCUTANEOUS APPROACH: ICD-10-PCS | Performed by: INTERNAL MEDICINE

## 2022-09-08 PROCEDURE — 87206 SMEAR FLUORESCENT/ACID STAI: CPT

## 2022-09-08 PROCEDURE — 87070 CULTURE OTHR SPECIMN AEROBIC: CPT

## 2022-09-08 PROCEDURE — 80048 BASIC METABOLIC PNL TOTAL CA: CPT

## 2022-09-08 PROCEDURE — 86140 C-REACTIVE PROTEIN: CPT

## 2022-09-08 PROCEDURE — 2580000003 HC RX 258: Performed by: INTERNAL MEDICINE

## 2022-09-08 PROCEDURE — 82947 ASSAY GLUCOSE BLOOD QUANT: CPT

## 2022-09-08 PROCEDURE — 87040 BLOOD CULTURE FOR BACTERIA: CPT

## 2022-09-08 PROCEDURE — 6370000000 HC RX 637 (ALT 250 FOR IP): Performed by: INTERNAL MEDICINE

## 2022-09-08 PROCEDURE — 2580000003 HC RX 258: Performed by: PHYSICIAN ASSISTANT

## 2022-09-08 PROCEDURE — 87015 SPECIMEN INFECT AGNT CONCNTJ: CPT

## 2022-09-08 PROCEDURE — 83615 LACTATE (LD) (LDH) ENZYME: CPT

## 2022-09-08 PROCEDURE — 71045 X-RAY EXAM CHEST 1 VIEW: CPT

## 2022-09-08 PROCEDURE — 6370000000 HC RX 637 (ALT 250 FOR IP): Performed by: PHYSICIAN ASSISTANT

## 2022-09-08 PROCEDURE — 85025 COMPLETE CBC W/AUTO DIFF WBC: CPT

## 2022-09-08 PROCEDURE — 87449 NOS EACH ORGANISM AG IA: CPT

## 2022-09-08 PROCEDURE — 87205 SMEAR GRAM STAIN: CPT

## 2022-09-08 PROCEDURE — 84157 ASSAY OF PROTEIN OTHER: CPT

## 2022-09-08 PROCEDURE — 99223 1ST HOSP IP/OBS HIGH 75: CPT | Performed by: INTERNAL MEDICINE

## 2022-09-08 PROCEDURE — 83880 ASSAY OF NATRIURETIC PEPTIDE: CPT

## 2022-09-08 PROCEDURE — 93005 ELECTROCARDIOGRAM TRACING: CPT | Performed by: EMERGENCY MEDICINE

## 2022-09-08 PROCEDURE — 84145 PROCALCITONIN (PCT): CPT

## 2022-09-08 PROCEDURE — 88112 CYTOPATH CELL ENHANCE TECH: CPT

## 2022-09-08 PROCEDURE — 71250 CT THORAX DX C-: CPT

## 2022-09-08 PROCEDURE — 0202U NFCT DS 22 TRGT SARS-COV-2: CPT

## 2022-09-08 PROCEDURE — 2060000000 HC ICU INTERMEDIATE R&B

## 2022-09-08 PROCEDURE — 87116 MYCOBACTERIA CULTURE: CPT

## 2022-09-08 PROCEDURE — 83986 ASSAY PH BODY FLUID NOS: CPT

## 2022-09-08 PROCEDURE — 99285 EMERGENCY DEPT VISIT HI MDM: CPT

## 2022-09-08 PROCEDURE — 88305 TISSUE EXAM BY PATHOLOGIST: CPT

## 2022-09-08 PROCEDURE — 84478 ASSAY OF TRIGLYCERIDES: CPT

## 2022-09-08 PROCEDURE — 6360000002 HC RX W HCPCS: Performed by: INTERNAL MEDICINE

## 2022-09-08 PROCEDURE — 2580000003 HC RX 258: Performed by: EMERGENCY MEDICINE

## 2022-09-08 RX ORDER — ATORVASTATIN CALCIUM 20 MG/1
20 TABLET, FILM COATED ORAL NIGHTLY
Status: DISCONTINUED | OUTPATIENT
Start: 2022-09-08 | End: 2022-09-10 | Stop reason: HOSPADM

## 2022-09-08 RX ORDER — ONDANSETRON 2 MG/ML
4 INJECTION INTRAMUSCULAR; INTRAVENOUS EVERY 6 HOURS PRN
Status: DISCONTINUED | OUTPATIENT
Start: 2022-09-08 | End: 2022-09-10 | Stop reason: HOSPADM

## 2022-09-08 RX ORDER — 0.9 % SODIUM CHLORIDE 0.9 %
500 INTRAVENOUS SOLUTION INTRAVENOUS ONCE
Status: COMPLETED | OUTPATIENT
Start: 2022-09-08 | End: 2022-09-08

## 2022-09-08 RX ORDER — ONDANSETRON 4 MG/1
4 TABLET, ORALLY DISINTEGRATING ORAL EVERY 8 HOURS PRN
Status: DISCONTINUED | OUTPATIENT
Start: 2022-09-08 | End: 2022-09-10 | Stop reason: HOSPADM

## 2022-09-08 RX ORDER — LATANOPROST 50 UG/ML
1 SOLUTION/ DROPS OPHTHALMIC NIGHTLY
Status: DISCONTINUED | OUTPATIENT
Start: 2022-09-08 | End: 2022-09-10 | Stop reason: HOSPADM

## 2022-09-08 RX ORDER — METOPROLOL SUCCINATE 25 MG/1
12.5 TABLET, EXTENDED RELEASE ORAL EVERY MORNING
Status: DISCONTINUED | OUTPATIENT
Start: 2022-09-09 | End: 2022-09-10 | Stop reason: HOSPADM

## 2022-09-08 RX ORDER — CHOLECALCIFEROL (VITAMIN D3) 50 MCG
2000 TABLET ORAL EVERY MORNING
Status: DISCONTINUED | OUTPATIENT
Start: 2022-09-09 | End: 2022-09-10 | Stop reason: HOSPADM

## 2022-09-08 RX ORDER — MENTHOL 40 MG/ML
GEL TOPICAL EVERY 6 HOURS PRN
COMMUNITY

## 2022-09-08 RX ORDER — METOPROLOL SUCCINATE 25 MG/1
12.5 TABLET, EXTENDED RELEASE ORAL EVERY MORNING
COMMUNITY

## 2022-09-08 RX ORDER — POLYETHYLENE GLYCOL 3350 17 G/17G
17 POWDER, FOR SOLUTION ORAL DAILY PRN
Status: DISCONTINUED | OUTPATIENT
Start: 2022-09-08 | End: 2022-09-10 | Stop reason: HOSPADM

## 2022-09-08 RX ORDER — SODIUM CHLORIDE 0.9 % (FLUSH) 0.9 %
5-40 SYRINGE (ML) INJECTION PRN
Status: DISCONTINUED | OUTPATIENT
Start: 2022-09-08 | End: 2022-09-10 | Stop reason: HOSPADM

## 2022-09-08 RX ORDER — SODIUM CHLORIDE 9 MG/ML
INJECTION, SOLUTION INTRAVENOUS PRN
Status: DISCONTINUED | OUTPATIENT
Start: 2022-09-08 | End: 2022-09-10 | Stop reason: HOSPADM

## 2022-09-08 RX ORDER — BRIMONIDINE TARTRATE 2 MG/ML
1 SOLUTION/ DROPS OPHTHALMIC DAILY
Status: DISCONTINUED | OUTPATIENT
Start: 2022-09-09 | End: 2022-09-10 | Stop reason: HOSPADM

## 2022-09-08 RX ORDER — FOLIC ACID 1 MG/1
1 TABLET ORAL EVERY MORNING
Status: DISCONTINUED | OUTPATIENT
Start: 2022-09-09 | End: 2022-09-10 | Stop reason: HOSPADM

## 2022-09-08 RX ORDER — SODIUM CHLORIDE 0.9 % (FLUSH) 0.9 %
5-40 SYRINGE (ML) INJECTION EVERY 12 HOURS SCHEDULED
Status: DISCONTINUED | OUTPATIENT
Start: 2022-09-08 | End: 2022-09-10 | Stop reason: HOSPADM

## 2022-09-08 RX ORDER — ASPIRIN 81 MG/1
81 TABLET, CHEWABLE ORAL EVERY MORNING
Status: DISCONTINUED | OUTPATIENT
Start: 2022-09-09 | End: 2022-09-10 | Stop reason: HOSPADM

## 2022-09-08 RX ORDER — ACETAMINOPHEN 650 MG/1
650 SUPPOSITORY RECTAL EVERY 6 HOURS PRN
Status: DISCONTINUED | OUTPATIENT
Start: 2022-09-08 | End: 2022-09-10 | Stop reason: HOSPADM

## 2022-09-08 RX ORDER — FAMOTIDINE 20 MG/1
20 TABLET, FILM COATED ORAL EVERY MORNING
Status: DISCONTINUED | OUTPATIENT
Start: 2022-09-09 | End: 2022-09-10 | Stop reason: HOSPADM

## 2022-09-08 RX ORDER — NITROGLYCERIN 0.4 MG/1
0.4 TABLET SUBLINGUAL EVERY 5 MIN PRN
Status: ON HOLD | COMMUNITY
End: 2022-09-10 | Stop reason: HOSPADM

## 2022-09-08 RX ORDER — ACETAMINOPHEN 325 MG/1
650 TABLET ORAL EVERY 6 HOURS PRN
Status: DISCONTINUED | OUTPATIENT
Start: 2022-09-08 | End: 2022-09-10 | Stop reason: HOSPADM

## 2022-09-08 RX ORDER — LIDOCAINE HYDROCHLORIDE 10 MG/ML
INJECTION, SOLUTION INFILTRATION; PERINEURAL
Status: DISPENSED
Start: 2022-09-08 | End: 2022-09-09

## 2022-09-08 RX ORDER — ASPIRIN 81 MG/1
81 TABLET, CHEWABLE ORAL EVERY MORNING
COMMUNITY

## 2022-09-08 RX ADMIN — LATANOPROST 1 DROP: 50 SOLUTION OPHTHALMIC at 20:59

## 2022-09-08 RX ADMIN — SODIUM CHLORIDE 500 ML: 9 INJECTION, SOLUTION INTRAVENOUS at 12:54

## 2022-09-08 RX ADMIN — PIPERACILLIN AND TAZOBACTAM 4500 MG: 4; .5 INJECTION, POWDER, FOR SOLUTION INTRAVENOUS at 19:41

## 2022-09-08 RX ADMIN — SODIUM CHLORIDE, PRESERVATIVE FREE 10 ML: 5 INJECTION INTRAVENOUS at 20:59

## 2022-09-08 RX ADMIN — ATORVASTATIN CALCIUM 20 MG: 20 TABLET, FILM COATED ORAL at 20:59

## 2022-09-08 RX ADMIN — ACETAMINOPHEN 650 MG: 325 TABLET ORAL at 21:09

## 2022-09-08 ASSESSMENT — ENCOUNTER SYMPTOMS
EYE PAIN: 0
BACK PAIN: 0
SORE THROAT: 0
VOMITING: 0
EYE DISCHARGE: 0
NAUSEA: 0
SINUS PRESSURE: 0
WHEEZING: 0
EYE REDNESS: 0
ABDOMINAL PAIN: 0
COUGH: 0
SHORTNESS OF BREATH: 0
DIARRHEA: 0

## 2022-09-08 ASSESSMENT — PAIN SCALES - GENERAL: PAINLEVEL_OUTOF10: 0

## 2022-09-08 ASSESSMENT — PAIN - FUNCTIONAL ASSESSMENT: PAIN_FUNCTIONAL_ASSESSMENT: NONE - DENIES PAIN

## 2022-09-08 NOTE — PROGRESS NOTES
Admission database completed to best of this RN's ability. Care plan and education initiated. Pt from the [facility]. Uses a [employer] with ambulation or WC PRN.

## 2022-09-08 NOTE — PROCEDURES
Thoracentesis Procedure Note  Indication: Pleural effusion, pneumothorax, and removal of fluid for diagnostic purposes    Consent: The patient was and family members were counseled regarding the procedure, it's indications, risks, potential complications and alternatives and any questions were answered. Consent was obtained. Procedure: The patient was placed in the sitting position, supported by a bed side stand and the appropriate landmarks were identified. The skin over the puncture site in the right posterior chest wall was prepped with betadine and draped in a sterile fashion. Local anesthesia was obtained by infiltration using 1% Lidocaine without epinephrine. A thoracentesis catheter was then advanced into the pleural space over a needle and the needle was withdrawn. Pleural fluid was returned which was serous. A total volume of 400 cc was withdrawn which was sent to the lab for appropriate testing. The catheter was then withdrawn and a sterile dressing was placed over the site. A post procedure film showed a decrease in the size of the effusion. The patient tolerated the procedure well. Complications: None      BEDSIDE LUNG ULTRASOUND    Risks, benefits and alternatives (for applicable procedures below) described. Performed By: . Indication: Pleural effusion  Informed consent: The patient was and family members were counseled regarding the procedure, it's indications, risks, potential complications and alternatives and any questions were answered. Consent was obtained. .  Procedural Quadrants/Interpretations:     Right Lung: Hydropneumothorax  Left Lung: Appropriate lung tissue

## 2022-09-08 NOTE — H&P
[facility] History and Physical      CHIEF COMPLAINT:  Chest tightness    History of Present Illness:      80year old male with past medical history of CAD, angina, subdural hematoma, KISHA, OA, hypertension, hyperlipidemia, CKD and NSTEMI presented to the ED for chest tightness. Patient states for the past week while working with PT, he has had a dry cough, SOB and chest tightness. Patient states SOB has gradually become at rest as well as with exertion. Today PT, mentioned chest tightness and SOB to nurse who called EMS. Patient was given nitro due to chest tightness which caused BP to drop. Received fluids per EMS with improvement. States after thoracentesis SOB and chest tightness have improved. Patient admits to smoking 0.5-1 ppd of cigarettes from age 25 to 27. Denies any drug or alcohol abuse. No known allergies. No known family history. Informant(s) for H&P: Patient and EMR     REVIEW OF SYSTEMS:  A comprehensive review of systems was negative except for: what is in the HPI      PMH:  Past Medical History:   Diagnosis Date    CKD (chronic kidney disease)     Fall     Hyperlipidemia     Hypertension     Iron deficiency anemia     NSTEMI (non-ST elevated myocardial infarction) ([facility] [address]     Subdural hematoma (HCC)     Vitamin D deficiency        Surgical History:  Past Surgical History:   Procedure Laterality Date    BACK SURGERY      CABG WITH AORTIC VALVE REPAIR         Medications Prior to Admission:    Prior to Admission medications    Medication Sig Start Date End Date Taking?  Authorizing Provider   Menthol, Topical Analgesic, (BIOFREEZE) 4 % GEL Apply topically every 6 hours as needed (pain) Apply to R knee   Yes Historical Provider, MD   aspirin 81 MG chewable tablet Take 1 tablet by mouth daily 7/14/22   Corrinne Banker, MD   metoprolol succinate (TOPROL XL) 25 MG extended release tablet Take 0.5 tablets by mouth nightly  Patient taking differently: Take 12.5 mg by mouth daily 7/13/22   yKlie Oviedo MD   Lactobacillus (ACIDOPHILUS) 0.5 MG TABS Take 1 tablet by mouth daily    Historical Provider, MD   brimonidine-timolol (COMBIGAN) 0.2-0.5 % ophthalmic solution Place 1 drop into both eyes every 12 hours    Historical Provider, MD   Cholecalciferol (VITAMIN D3) 50 MCG (2000 UT) CAPS Take 1 capsule by mouth daily    Historical Provider, MD   famotidine (PEPCID) 20 MG tablet Take 20 mg by mouth daily    Historical Provider, MD   vitamin B-12 (CYANOCOBALAMIN) 1000 MCG tablet Take 1,000 mcg by mouth daily    Historical Provider, MD   vitamin B-6 (PYRIDOXINE) 100 MG tablet Take 100 mg by mouth daily    Historical Provider, MD   Folic Acid 0.8 MG CAPS Take 1 capsule by mouth daily    Historical Provider, MD   atorvastatin (LIPITOR) 40 MG tablet Take 20 mg by mouth at bedtime     Historical Provider, MD   Travoprost, ABHISHEK Free, (TRAVATAN Z) 0.004 % SOLN ophthalmic solution Place 1 drop into both eyes nightly    Historical Provider, MD       Allergies:    Patient has no known allergies. Social History:    reports that he has never smoked. He has never used smokeless tobacco. He reports current alcohol use. He reports that he does not use drugs. Family History:   family history is not on file.       PHYSICAL EXAM:  Vitals:  /60   Pulse 64   Temp 98.1 °F (36.7 °C) (Oral)   Resp 14   Ht 5' 10\" (1.778 m)   Wt 160 lb (72.6 kg)   SpO2 100%   BMI 22.96 kg/m²   General Appearance: alert and oriented to person, place and time and in no acute distress  Skin: warm and dry  Head: normocephalic and atraumatic  Eyes: pupils equal, round, and reactive to light, extraocular eye movements intact, conjunctivae normal  Neck: neck supple and non tender without mass   Pulmonary/Chest: diminished breath sounds on the right, no wheezes, rales or rhonchi, normal air movement, no respiratory distress  Cardiovascular: normal rate, normal S1 and S2 and no carotid bruits  Abdomen: soft, non-tender, non-distended, normal bowel sounds, no masses or organomegaly  Extremities: no cyanosis, no clubbing and no edema  Neurologic: no cranial nerve deficit and speech normal        LABS:  Recent Labs     09/08/22  1142      K 4.4      CO2 24   BUN 23   CREATININE 1.4*   GLUCOSE 135*   CALCIUM 8.4*       Recent Labs     09/08/22  1142   WBC 8.9   RBC 3.29*   HGB 10.1*   HCT 30.9*   MCV 93.9   MCH 30.7   MCHC 32.7   RDW 14.2      MPV 10.2       No results for input(s): POCGLU in the last 72 hours. Radiology:   CT CHEST WO CONTRAST   Final Result   Mildly enlarged mediastinal lymph nodes. Consolidation and collapse along with ground-glass opacities in all lobes of   the right lung. Right hydropneumothorax. Patchy infiltrative changes in the left lung base. Postsurgical changes and other chronic findings as described. XR CHEST PORTABLE   Final Result   30-40% right-sided pneumothorax. Diffuse right lung pneumonia. Small right   pleural effusion cannot be excluded. The findings were discussed with Dr. Parvin Hunt at 12:18 p.m. XR CHEST PORTABLE    (Results Pending)       EKG:   Normal sinus rhythm  Nonspecific T wave abnormality  Abnormal ECG  When compared with ECG of 11-JUL-2022 05:30,  Nonspecific T wave abnormality, worse in Anterolateral leads    ASSESSMENT:      Principal Problem:    Pneumothorax on right  Resolved Problems:    * No resolved hospital problems. *      PLAN:    1. Hydropneumothorax: CT chest showing mildly enlarge mediastinal LN, consolidation and collapse along with ground glass opacities all lobes of the right lung, right hydropneumothorax, patchy infiltrative changes in the left lung base. Pulmonary consulted to ED. S/p thoracentesis in the ED with 400 cc removed. Continue supplemental O2. Cytology and gram stain ordered. 2. Pneumonia:  CT as above. Pulmonary consulted.  CRP, procal, strep, legionella, and respiratory panel ordered. Continue Zosyn. 3. Subdural hematoma: CT head on 4/7/22 showing 12 mm left holo hemispheric subacute subdural hematoma. Increased in size compared to 3/12/22. No significant midline shift. Follows with Neurosurgery outpatient. He is to avoid all antiplatelet and anticoagulation medications, per Neurosurgery recommendations    4. Hypertension: Continue Lopressor    5. Hyperlipidemia: Continue Lipitor     6. CKD: Baseline Cr 1.4-1.6. At baseline. Avoid nephrotoxic agents. Monitor. 7. Hx of surgical AVR: Continue ASA daily. Last seen by Cardiology in 2016.     8. Iron deficiency anemia: Was taking iron supplementation but was stopped due to constipation. Hgb today 10.1. Monitor H&H. Transfuse prn for hemoglobin <7.     9. Hx of NSTEMI    10. OA of left knee: has been seen by orthopedic surgery in the past     Code Status: DNR-CCA  DVT prophylaxis: SCDs due to subdural hematoma       NOTE: This report was transcribed using voice recognition software. Every effort was made to ensure accuracy; however, inadvertent computerized transcription errors may be present. Electronically signed by Jayant Rider PA-C on 9/8/2022 at 3:21 PM     45 minutes time spent reviewing patient chart, assessing patient, discussing plan of care with patient and family, discussing plan of care with collaborating physician, and charting. HOSPITALIST ATTENDING PHYSICIAN NOTE 9/8/2022 1908PM:    Details of the evaluation - subjective assessment (including medication profile, past medical, family and social history when applicable), examination, review of lab and test data, diagnostic impressions and medical decision making - performed by Jayant Rider PA-C, were discussed with me on the date of service and I agree with clinical information herein unless otherwise noted. The patient has been evaluated by me personally earlier today. Pt reports no fevers, chills,n/v. Fam hx reviewed with pt.          PHYSICAL EXAM:    Vitals:  BP (!) 168/79   Pulse (!) 105   Temp 97.9 °F (36.6 °C) (Oral)   Resp 16   Ht 5' 10\" (1.778 m)   Wt 160 lb (72.6 kg)   SpO2 96%   BMI 22.96 kg/m²     General:  Appears comfortable. Answers questions appropriately and cooperative with exam  HEENT:  Mucous membranes moist. No erythema, rhinorrhea, or post-nasal drip noted. Neck:  No carotid bruits. Heart:  Rhythm regular at rate of 100  Lungs:  CTA. No wheeze, rales, or rhonchi  Abdomen:  Positive bowel sounds positive. Soft. Non-tender. No guarding, rebound or rigidity. Breast/Rectal/Genitourinary: not pertinent. Extremities:  Negative for lower extremity edema  Skin:  Warm and dry  Vascular: 2/4 Dorsalis Pedis pulses bilaterally. Neuro:  Cranial nerves 2-12 grossly intact, no focal weakness or change in sensation noted. Extraocular muscles intact. Pupils equal, round, reactive to light. I agree with the assessment and plan of Elsie Salguero PA-C    Hydropneumothorax  Possible bacterial pneumonia  Htn  Hyperlipidemia        Electronically signed by Greg Carrasquillo D.O.   Hospitalist  [facility] at [facility]

## 2022-09-08 NOTE — PLAN OF CARE
Problem: ABCDS Injury Assessment  Goal: Absence of physical injury  Outcome: Progressing  Flowsheets  Taken 9/8/2022 1730  Absence of Physical Injury: Implement safety measures based on patient assessment  Taken 9/8/2022 1657  Absence of Physical Injury: Implement safety measures based on patient assessment     Problem: Pain  Goal: Verbalizes/displays adequate comfort level or baseline comfort level  Outcome: Progressing  Flowsheets (Taken 9/8/2022 1730)  Verbalizes/displays adequate comfort level or baseline comfort level:   Encourage patient to monitor pain and request assistance   Assess pain using appropriate pain scale     Problem: Discharge Planning  Goal: Discharge to home or other facility with appropriate resources  Outcome: Progressing  Flowsheets (Taken 9/8/2022 1730)  Discharge to home or other facility with appropriate resources: Identify barriers to discharge with patient and caregiver     Problem: Safety - Adult  Goal: Free from fall injury  Outcome: Progressing  Flowsheets (Taken 9/8/2022 1730)  Free From Fall Injury: Instruct family/caregiver on patient safety

## 2022-09-08 NOTE — CONSULTS
Yodit William M.D.,[facility]  Sabrina Andrew D.O., F.A.C.O.I., Nathan Treadwell M.D. Samantha Cadena M.D. Estrella Alvarez D.O. Patient:  Adriana Tsang 80 y.o. male MRN: [id number]     Date of Service: 9/8/2022      PULMONARY CONSULTATION    Reason for Consultation: hydropneumothorax  Referring Physician: Dr. Grant Oneil with the referring physician will be sent via the electronic medical record. Chief Complaint: SOB    CODE STATUS: DNR-CCA    SUBJECTIVE:  HPI:  Adriana Tsang is a 80 y.o.  with history of recent NSTEMI on 7/10 presented to the ED on 9/8 for 1 week history of chest tightness, shortness of breath, and a nonproductive cough. Patient has not done anything to make his symptoms better nor worse. Patient denies any traumatic events or falls. He denies any history of lung disorders. Patient does endorse a 14ppd smoking history when he was 25to 28years old. He also states he was in [place] and worked on [employer] where he did have some asbestos exposure. Past Medical History:   Diagnosis Date    CKD (chronic kidney disease)     Fall     Hyperlipidemia     Hypertension     Iron deficiency anemia     NSTEMI (non-ST elevated myocardial infarction) ([facility] [address]     Subdural hematoma (HCC)     Vitamin D deficiency        Past Surgical History:   Procedure Laterality Date    BACK SURGERY      CABG WITH AORTIC VALVE REPAIR         History reviewed. No pertinent family history. Social History:   Social History     Socioeconomic History    Marital status:       Spouse name: Not on file    Number of children: Not on file    Years of education: Not on file    Highest education level: Not on file   Occupational History    Not on file   Tobacco Use    Smoking status: Never    Smokeless tobacco: Never   Vaping Use    Vaping Use: Never used   Substance and Sexual Activity    Alcohol use: Yes     Comment: occasionally    Drug use: Never    Sexual activity: Not on file   Other Topics Concern Not on file   Social History Narrative    Not on file     Social Determinants of Health     Financial Resource Strain: Not on file   Food Insecurity: Not on file   Transportation Needs: Not on file   Physical Activity: Not on file   Stress: Not on file   Social Connections: Not on file   Intimate Partner Violence: Not on file   Housing Stability: Not on file     Smoking history: The patient is a Past smoker of @year@. Pack year equal 14. ETOH:   reports current alcohol use. Exposures: There  is not history of TB or TB exposure. There is asbestos or silica dust exposure. The patient reports does not have coal, foundry, quarry or [employer] exposure. Recent travel history None. There is not  history of recreational or IV drug use. There is not hot tub exposure. The patient does not have any exotic pets, turtles or exotic birds. Vaccines:    Influenza:  Unknown  Pneumococcal Polysaccharide:  Unknowna  Immunization History   Administered Date(s) Administered    Td (Adult), 2 Lf Tetanus Toxoid, Pf (Td, Absorbed) 03/11/2022        Home Meds: Not in a hospital admission. CURRENT MEDS :  Scheduled Meds:   lidocaine        piperacillin-tazobactam  3,375 mg IntraVENous Q8H       Continuous Infusions:      No Known Allergies    REVIEW OF SYSTEMS:  Constitutional: Denies fever, weight loss, night sweats, and fatigue  Skin: Denies pigmentation, dark lesions, and rashes   HEENT: Denies hearing loss, tinnitus, ear drainage, epistaxis, sore throat, and hoarseness. Cardiovascular: Denies palpitations, chest pain, and chest pressure. Respiratory: Endorses dry cough, dyspnea with exertion. Denies hemoptysis, apnea, and choking.   Gastrointestinal: Denies nausea, vomiting, poor appetite, diarrhea, heartburn or reflux  Genitourinary: Denies dysuria, frequency, urgency or hematuria  Musculoskeletal: Denies myalgias, muscle weakness, and bone pain  Neurological: Denies dizziness, vertigo, headache, and focal weakness  Psychological: Denies anxiety and depression  Endocrine: Denies heat intolerance and cold intolerance  Hematopoietic/Lymphatic: Denies bleeding problems and blood transfusions    OBJECTIVE:   /60   Pulse 64   Temp 98.1 °F (36.7 °C) (Oral)   Resp 14   Ht 5' 10\" (1.778 m)   Wt 160 lb (72.6 kg)   SpO2 100%   BMI 22.96 kg/m²   SpO2 Readings from Last 1 Encounters:   09/08/22 100%        I/O:  No intake or output data in the 24 hours ending 09/08/22 1548                   Physical Exam:  General: The patient is lying in bed comfortably without any distress. Breathing is not labored  HEENT: Pupils are equal round and reactive to light, there are no oral lesions and no post-nasal drip   Neck: supple without adenopathy  Cardiovascular: regular rate and rhythm without murmur or gallop  Respiratory: R lung with decreased breath sounds, rhonchi and crackles. Abdomen: soft, non-tender, non-distended  Extremities: warm, no edema, no clubbing  Skin: no rash or lesion  Neurologic: CN II-XII grossly intact, no focal deficits    Pulmonary Function Testing personally reviewed and interpreted      Imaging personally reviewed:    Hydropneumothorax on the right.       Echo:      Labs:  Lab Results   Component Value Date/Time    WBC 8.9 09/08/2022 11:42 AM    HGB 10.1 09/08/2022 11:42 AM    HCT 30.9 09/08/2022 11:42 AM    MCV 93.9 09/08/2022 11:42 AM    MCH 30.7 09/08/2022 11:42 AM    MCHC 32.7 09/08/2022 11:42 AM    RDW 14.2 09/08/2022 11:42 AM     09/08/2022 11:42 AM    MPV 10.2 09/08/2022 11:42 AM     Lab Results   Component Value Date/Time     09/08/2022 11:42 AM    K 4.4 09/08/2022 11:42 AM    K 4.1 07/10/2022 08:48 AM     09/08/2022 11:42 AM    CO2 24 09/08/2022 11:42 AM    BUN 23 09/08/2022 11:42 AM    CREATININE 1.4 09/08/2022 11:42 AM    LABALBU 3.3 07/10/2022 08:48 AM    CALCIUM 8.4 09/08/2022 11:42 AM    GFRAA 57 09/08/2022 11:42 AM    LABGLOM 47 09/08/2022 11:42 AM     Lab Results Component Value Date/Time    PROTIME 11.5 03/11/2022 07:02 PM    INR 1.0 03/11/2022 07:02 PM     Recent Labs     09/08/22  1142   PROBNP 7,870*     No results for input(s): TROPONINI in the last 72 hours. No results for input(s): PROCAL in the last 72 hours. This SmartLink has not been configured with any valid records. Micro:  No results for input(s): CULTRESP in the last 72 hours. No results for input(s): LABGRAM in the last 72 hours. No results for input(s): LEGUR in the last 72 hours. No results for input(s): STREPNEUMAGU in the last 72 hours. No results for input(s): LP1UAG in the last 72 hours. Assessment:  Hydropneumothorax  Pneumonia  Hx of NSTEMI    Plan:  Plan for thoracentesis, pH appropriate. Cytology, gram stain, protein, LDH, and cultures ordered and are pending. Zosyn for pneumonia, procalcitonin, legionella, and strep pending  Continue supplemental O2 for pneumothorax  Continue home aspirin      Thank you for allowing me to participate in the care of [facility]. Please feel free to call with questions. This plan of care was reviewed in collaboration with Dr. Emanuel Kauffman    Electronically signed by Isabella Sheppard DO on 9/8/2022 at 3:48 PM      Note: This report was completed utilizing computer voice recognition software. Every effort has been made to ensure accuracy, however; inadvertent computerized transcription errors may be present        Addendum:    I personally saw, examined and provided care for the patient. Radiographs, labs and medication list were reviewed by me independently. CT scan show right hydropneumothorax along with infiltrates . We did perform a bedside right chest US and based on the findings  decided to proceed with a thoracentesis vs CT placement. Please see procedure not. PH pleural fluid 7.38. Not consistent with empyema. Most likely parpneumonic effusion. Will Follow CXR daily. Start zosyn, follow cultures.     The case was discussed in detail and plans for care were established. Review of Residents documentation was conducted and revisions were made as appropriate. I agree with the above documented exam, problem list and plan of care.      Judy Martinez MD

## 2022-09-09 ENCOUNTER — APPOINTMENT (OUTPATIENT)
Dept: GENERAL RADIOLOGY | Age: 87
DRG: 199 | End: 2022-09-09
Payer: MEDICARE

## 2022-09-09 LAB
ANION GAP SERPL CALCULATED.3IONS-SCNC: 11 MMOL/L (ref 7–16)
BASOPHILS ABSOLUTE: 0.11 E9/L (ref 0–0.2)
BASOPHILS RELATIVE PERCENT: 1.1 % (ref 0–2)
BUN BLDV-MCNC: 22 MG/DL (ref 6–23)
CALCIUM SERPL-MCNC: 8.8 MG/DL (ref 8.6–10.2)
CHLORIDE BLD-SCNC: 103 MMOL/L (ref 98–107)
CO2: 21 MMOL/L (ref 22–29)
CREAT SERPL-MCNC: 1.3 MG/DL (ref 0.7–1.2)
EOSINOPHILS ABSOLUTE: 0.78 E9/L (ref 0.05–0.5)
EOSINOPHILS RELATIVE PERCENT: 7.8 % (ref 0–6)
GFR AFRICAN AMERICAN: >60
GFR NON-AFRICAN AMERICAN: 52 ML/MIN/1.73
GLUCOSE BLD-MCNC: 100 MG/DL (ref 74–99)
GRAM STAIN ORDERABLE: NORMAL
HCT VFR BLD CALC: 32.4 % (ref 37–54)
HEMOGLOBIN: 10.6 G/DL (ref 12.5–16.5)
IMMATURE GRANULOCYTES #: 0.04 E9/L
IMMATURE GRANULOCYTES %: 0.4 % (ref 0–5)
L. PNEUMOPHILA SEROGP 1 UR AG: NORMAL
LYMPHOCYTES ABSOLUTE: 1.95 E9/L (ref 1.5–4)
LYMPHOCYTES RELATIVE PERCENT: 19.6 % (ref 20–42)
MCH RBC QN AUTO: 30.1 PG (ref 26–35)
MCHC RBC AUTO-ENTMCNC: 32.7 % (ref 32–34.5)
MCV RBC AUTO: 92 FL (ref 80–99.9)
MONOCYTES ABSOLUTE: 0.96 E9/L (ref 0.1–0.95)
MONOCYTES RELATIVE PERCENT: 9.6 % (ref 2–12)
NEUTROPHILS ABSOLUTE: 6.13 E9/L (ref 1.8–7.3)
NEUTROPHILS RELATIVE PERCENT: 61.5 % (ref 43–80)
PDW BLD-RTO: 14.1 FL (ref 11.5–15)
PLATELET # BLD: 257 E9/L (ref 130–450)
PMV BLD AUTO: 9.8 FL (ref 7–12)
POTASSIUM REFLEX MAGNESIUM: 4.5 MMOL/L (ref 3.5–5)
RBC # BLD: 3.52 E12/L (ref 3.8–5.8)
SODIUM BLD-SCNC: 135 MMOL/L (ref 132–146)
STREP PNEUMONIAE ANTIGEN, URINE: NORMAL
WBC # BLD: 10 E9/L (ref 4.5–11.5)

## 2022-09-09 PROCEDURE — 85025 COMPLETE CBC W/AUTO DIFF WBC: CPT

## 2022-09-09 PROCEDURE — 80048 BASIC METABOLIC PNL TOTAL CA: CPT

## 2022-09-09 PROCEDURE — 6370000000 HC RX 637 (ALT 250 FOR IP): Performed by: INTERNAL MEDICINE

## 2022-09-09 PROCEDURE — 36415 COLL VENOUS BLD VENIPUNCTURE: CPT

## 2022-09-09 PROCEDURE — 97165 OT EVAL LOW COMPLEX 30 MIN: CPT

## 2022-09-09 PROCEDURE — 71045 X-RAY EXAM CHEST 1 VIEW: CPT

## 2022-09-09 PROCEDURE — 97161 PT EVAL LOW COMPLEX 20 MIN: CPT

## 2022-09-09 PROCEDURE — 6360000002 HC RX W HCPCS: Performed by: INTERNAL MEDICINE

## 2022-09-09 PROCEDURE — 2580000003 HC RX 258: Performed by: NURSE PRACTITIONER

## 2022-09-09 PROCEDURE — 2580000003 HC RX 258: Performed by: INTERNAL MEDICINE

## 2022-09-09 PROCEDURE — 97530 THERAPEUTIC ACTIVITIES: CPT

## 2022-09-09 PROCEDURE — 6360000002 HC RX W HCPCS: Performed by: NURSE PRACTITIONER

## 2022-09-09 PROCEDURE — 2060000000 HC ICU INTERMEDIATE R&B

## 2022-09-09 PROCEDURE — 2580000003 HC RX 258: Performed by: PHYSICIAN ASSISTANT

## 2022-09-09 PROCEDURE — 99232 SBSQ HOSP IP/OBS MODERATE 35: CPT | Performed by: INTERNAL MEDICINE

## 2022-09-09 PROCEDURE — 6370000000 HC RX 637 (ALT 250 FOR IP): Performed by: PHYSICIAN ASSISTANT

## 2022-09-09 RX ORDER — AMOXICILLIN AND CLAVULANATE POTASSIUM 875; 125 MG/1; MG/1
1 TABLET, FILM COATED ORAL EVERY 12 HOURS SCHEDULED
Status: DISCONTINUED | OUTPATIENT
Start: 2022-09-10 | End: 2022-09-10 | Stop reason: HOSPADM

## 2022-09-09 RX ADMIN — LATANOPROST 1 DROP: 50 SOLUTION OPHTHALMIC at 20:25

## 2022-09-09 RX ADMIN — PIPERACILLIN AND TAZOBACTAM 3375 MG: 3; .375 INJECTION, POWDER, LYOPHILIZED, FOR SOLUTION INTRAVENOUS at 17:33

## 2022-09-09 RX ADMIN — PIPERACILLIN AND TAZOBACTAM 3375 MG: 3; .375 INJECTION, POWDER, LYOPHILIZED, FOR SOLUTION INTRAVENOUS at 07:57

## 2022-09-09 RX ADMIN — SODIUM CHLORIDE, PRESERVATIVE FREE 10 ML: 5 INJECTION INTRAVENOUS at 07:54

## 2022-09-09 RX ADMIN — ASPIRIN 81 MG CHEWABLE TABLET 81 MG: 81 TABLET CHEWABLE at 07:58

## 2022-09-09 RX ADMIN — FOLIC ACID 1 MG: 1 TABLET ORAL at 07:58

## 2022-09-09 RX ADMIN — PIPERACILLIN AND TAZOBACTAM 3375 MG: 3; .375 INJECTION, POWDER, LYOPHILIZED, FOR SOLUTION INTRAVENOUS at 01:08

## 2022-09-09 RX ADMIN — METOPROLOL SUCCINATE 12.5 MG: 25 TABLET, EXTENDED RELEASE ORAL at 07:57

## 2022-09-09 RX ADMIN — ACETAMINOPHEN 650 MG: 325 TABLET ORAL at 20:23

## 2022-09-09 RX ADMIN — FAMOTIDINE 20 MG: 20 TABLET ORAL at 07:58

## 2022-09-09 RX ADMIN — ATORVASTATIN CALCIUM 20 MG: 20 TABLET, FILM COATED ORAL at 20:23

## 2022-09-09 RX ADMIN — SODIUM CHLORIDE, PRESERVATIVE FREE 10 ML: 5 INJECTION INTRAVENOUS at 20:24

## 2022-09-09 RX ADMIN — Medication 2000 UNITS: at 07:58

## 2022-09-09 RX ADMIN — BRIMONIDINE TARTRATE 1 DROP: 2 SOLUTION OPHTHALMIC at 07:57

## 2022-09-09 ASSESSMENT — PAIN SCALES - GENERAL
PAINLEVEL_OUTOF10: 0

## 2022-09-09 NOTE — CARE COORDINATION
Social work / Discharge Planning:        Patient is from [facility] [employer] [facility] and had a recent stay at [facility]. PT/OT evaluations ordered to assist in determining if patient can return to [facility] LOC. Facility liaison is going to save a bed at [facility] is needed for discharge. Social work will follow.     Electronically signed by KIMI Jacob on 9/9/2022 at 9:50 AM

## 2022-09-09 NOTE — PROGRESS NOTES
Occupational Therapy  OCCUPATIONAL THERAPY INITIAL EVALUATION     Manisha Turner [facility] [place], [place]        Date:2022                                                  Patient Name: Sean Mcmillan    MRN: [id number]    : 1930    Room: [address] [place] [address]      Evaluating OT: Tricia Garvey OTR/L [id number]      Referring Provider: Rere Negrete MD    Specific Provider Orders/Date: OT eval and treat 22      Diagnosis:  Pneumothorax on right [J93.9]      Pertinent Medical History: arthritis, CAD, CKD, HTN, subdural hematoma          Precautions:  Fall Risk, alarm     Assessment of current deficits    [x] Functional mobility  [x]ADLs  [x] Strength               []Cognition    [x] Functional transfers   [x] IADLs         [x] Safety Awareness   [x]Endurance    [] Fine Coordination              [x] Balance      [] Vision/perception   []Sensation     []Gross Motor Coordination  [] ROM  [] Delirium                   [] Motor Control     OT PLAN OF CARE   OT POC based on physician orders, patient diagnosis and results of clinical assessment    Frequency/Duration 2-4 days/wk for 2 weeks PRN   Specific OT Treatment Interventions to include:   * Instruction/training on adapted ADL techniques and AE recommendations to increase functional independence within precautions       * Training on energy conservation strategies, correct breathing pattern and techniques to improve independence/tolerance for self-care routine  * Functional transfer/mobility training/DME recommendations for increased independence, safety, and fall prevention  * Patient/Family education to increase follow through with safety techniques and functional independence  * Recommendation of environmental modifications for increased safety with functional transfers/mobility and ADLs  * Therapeutic exercise to improve motor endurance, ROM, and functional strength for ADLs/functional transfers  * Therapeutic activities to facilitate/challenge dynamic balance, stand tolerance for increased safety and independence with ADLs  * Positioning to improve skin integrity, interaction with environment and functional independence        Recommended Adaptive Equipment: TBD     Home Living: [facility]. Pt reports that he uses wheeled walker for functional mobility for short distances and w/c for long distances or when he is going to the bathroom. Pt receives assistance with bathing but is able to complete his own dressing. Pain Level: pt did not report pain this date; pt agreeable to therapy  Cognition: A&O: 4/4; WFL command follow demonstrated. Pt pleasant throughout session. Memory:  Fair+   Sequencing:  Fair+   Problem solving:  Fair   Judgement/safety:  Fair     Functional Assessment:  AM-PAC Daily Activity Raw Score: 17/24   Initial Eval Status  Date: 9/9/22 Treatment Status  Date: STGs = LTGs  Time frame: 10-14 days   Feeding Independent      Grooming CGA   To complete hand hygiene standing at sink. Occasional use of BUE for support on sink  Mod I/I   UB Dressing Min A  For gown management  Mod I/I   LB Dressing Max A  To don/doff socks, seated EOB   SBA-with use of AD as appropriate/needed   Bathing Mod A  SBA -with use of AD as appropriate/needed   Toileting Min A  For clothing management   Sup/I    Bed Mobility  Supine to sit: Sup   Sit to supine: Sup   Independent    Functional Transfers CGA with wheeled walker  Sit<>stand from EOB  Sit<>stand from commode  Cues for hand placement and safe technique. Cues for use of grab bar during toilet transfer  Independent     Functional Mobility CGA with wheeled walker  To and from bathroom  Cues safe wheeled walker management and navigation. Pt letting walker get too far away. Cues for technique.    Independent  -with device as needed to maximize independence with ADLs and functional task completion   Balance Sitting:     Static:  Sup    Dynamic:SBA  Standing: CGA with wheeled walker  I for static/dynamic sitting balance to maximize independence with ADLs and functional task completion    I for standing balance to maximize independence with ADLs and functional task completion   Activity Tolerance Fair with light activity. Pt limited by fatigue. Good with ADL activity. Pt will demonstrate good understanding of education provided on EC/WS techniques    Visual/  Perceptual Glasses: at bedside                Additional long-term goal: Pt will increase functional independence to PLOF to allow pt to live in least restrictive environment. Hand Dominance R   AROM (PROM) Strength Additional Info:    EDGAR  [facility] WFL WFL  and wfl FMC/dexterity noted during ADL tasks   RICHAR [facility] WFL WFL  and wfl FMC/dexterity noted during ADL tasks     Hearing: WFL  Sensation:  No c/o numbness or tingling  Tone: WFL  Edema: none at bedside    Comments: Upon arrival patient lying in bed. At end of session, patient returned to bed with call light and phone within reach, all lines and tubes intact, and alarm set. Overall patient demonstrated decreased independence and safety during completion of ADL/functional transfer/mobility tasks. Pt would benefit from continued skilled OT to increase safety and independence with completion of ADL/IADL tasks for functional independence and quality of life. Rehab Potential: Good for established goals     Patient / Family Goal: none stated    Patient and/or family were instructed on functional diagnosis, prognosis/goals and OT plan of care. Demonstrated fair+ understanding.      Eval Complexity: Low    Time In: 1412  Time Out: 1425    Min Units   OT Eval Low 97165  x  1   OT Eval Medium 48082      OT Eval High 33299      OT Re-Eval [id number]       Therapeutic Ex 15680       Therapeutic Activities 80189       ADL/Self Care 95703       Orthotic Management 13983       Manual 12505     Neuro Re-Ed 86420       Non-Billable Time          Evaluation Time additionally includes thorough review of current medical information, gathering information on past medical history/social history and prior level of function, interpretation of standardized testing/informal observation of tasks, assessment of data and development of plan of care and goals.             Melissa Perdomo, OTR/L, [id number]

## 2022-09-09 NOTE — PROGRESS NOTES
Mannie Shin M.D.,[facility]  Yaniv Galvan D.O., F.A.C.O.I., Rhea Vargas M.D. Margie Oneil M.D. Sandip Gallegos D.O. Daily Pulmonary Progress Note    Patient:  Goran Keys 80 y.o. male MRN: [id number]     Date of Service: 9/9/2022      Synopsis     We are following patient for hydropneumothorax    \"CC\" fatigue    Code status: DNR CCA      Subjective      Patient was seen and examined. Feeling better since R thoracentesis yesterday. No dyspnea or chest pain. Improved fatigue. No cough or mucus. Not requiring oxygen. Pleural fluid consistent with exudate. Await cultures and cytology. Remains on Zosyn day 2. No need for chest tube post thora, small residual R apical ptx appreciated. Review of Systems:  Constitutional: Denies fever, weight loss, night sweats, Improved fatigue  Skin: Denies pigmentation, dark lesions, and rashes   HEENT: Denies hearing loss, tinnitus, ear drainage, epistaxis, sore throat, and hoarseness. Cardiovascular: Denies palpitations, chest pain, and chest pressure. Respiratory: Denies cough, dyspnea at rest, hemoptysis, apnea, and choking.   Gastrointestinal: Denies nausea, vomiting, poor appetite, diarrhea, heartburn or reflux  Genitourinary: Denies dysuria, frequency, urgency or hematuria  Musculoskeletal: Denies myalgias, muscle weakness, and bone pain  Neurological: Denies dizziness, vertigo, headache, and focal weakness  Psychological: Denies anxiety and depression  Endocrine: Denies heat intolerance and cold intolerance  Hematopoietic/Lymphatic: Denies bleeding problems and blood transfusions    24-hour events:  None     Objective   Vitals: /64   Pulse 88   Temp 97.9 °F (36.6 °C) (Oral)   Resp 20   Ht 5' 10\" (1.778 m)   Wt 160 lb (72.6 kg)   SpO2 96%   BMI 22.96 kg/m²     I/O:    Intake/Output Summary (Last 24 hours) at 9/9/2022 1100  Last data filed at 9/8/2022 1844  Gross per 24 hour   Intake --   Output 200 ml   Net -200 ml CURRENT MEDS :  Scheduled Meds:   sodium chloride flush  5-40 mL IntraVENous 2 times per day    aspirin  81 mg Oral QAM    atorvastatin  20 mg Oral Nightly    vitamin D  2,000 Units Oral QAM    famotidine  20 mg Oral QAM    folic acid  1 mg Oral QAM    metoprolol succinate  12.5 mg Oral QAM    piperacillin-tazobactam  3,375 mg IntraVENous Q8H    brimonidine  1 drop Both Eyes Daily    latanoprost  1 drop Both Eyes Nightly       Physical Exam:  General Appearance: appears comfortable in no acute distress. HEENT: Normocephalic atraumatic without obvious abnormality   Neck: Lips, mucosa, and tongue normal.  Supple, symmetrical, trachea midline, no adenopathy;thyroid:  no enlargement/tenderness/nodules or JVD. Lung: Breath sounds CTA. Respirations   unlabored. Symmetrical expansion. Heart: RRR, normal S1, S2. No MRG  Abdomen: Soft, NT, ND. BS present x 4 quadrants. No bruit or organomegaly. Extremities: Pedal pulses 2+ symmetric b/l. Extremities normal, no cyanosis, clubbing, or edema. Musculokeletal: No joint swelling, no muscle tenderness. ROM normal in all joints of extremities. Neurologic: Mental status: Alert and Oriented X3 . Pertinent/ New Labs and Imaging Studies     Imaging Personally Reviewed:  9/8/22 post [facility] cxr  FINDINGS:   The patient is status post sternotomy. The cardiac silhouette is unchanged   in size. Significant parenchymal changes are again seen in the right lung. There is a right apical pneumothorax which has decreased in size. There has   also been interval decrease in size of a right pleural effusion. Impression   Redemonstration of significant parenchymal changes in the right lung. Improved but residual right apical pneumothorax. Decreased right pleural   effusion. CT chest 9/8/22  Mildly enlarged mediastinal lymph nodes. Consolidation and collapse along with ground-glass opacities in all lobes of   the right lung.        Right hydropneumothorax. Patchy infiltrative changes in the left lung base. Postsurgical changes and other chronic findings as described. ECHO  7/10/22    Summary   Left ventricular internal dimensions were normal in diastole and systole. Mild asymmetric septal hypertrophy. Septal motion consistent with post cardiac surgery. Regional wall motion abnormality with mild hypokinesis of basilar inferior   and inferolateral segment. Mildly reduced left ventricular systolic dysfunction. The left atrium is mildly dilated. Focal calcification mitral valve leaflets. Moderate mitral annular calcification. Mild mitral regurgitation is present. Normally functioning bioprosthetic valve in aortic position. Mild aortic regurgitation is noted. Mild tricuspid regurgitation. Labs:  Lab Results   Component Value Date/Time    WBC 10.0 09/09/2022 02:45 AM    HGB 10.6 09/09/2022 02:45 AM    HCT 32.4 09/09/2022 02:45 AM    MCV 92.0 09/09/2022 02:45 AM    MCH 30.1 09/09/2022 02:45 AM    MCHC 32.7 09/09/2022 02:45 AM    RDW 14.1 09/09/2022 02:45 AM     09/09/2022 02:45 AM    MPV 9.8 09/09/2022 02:45 AM     Lab Results   Component Value Date/Time     09/09/2022 02:45 AM    K 4.5 09/09/2022 02:45 AM     09/09/2022 02:45 AM    CO2 21 09/09/2022 02:45 AM    BUN 22 09/09/2022 02:45 AM    CREATININE 1.3 09/09/2022 02:45 AM    LABALBU 3.3 07/10/2022 08:48 AM    CALCIUM 8.8 09/09/2022 02:45 AM    GFRAA >60 09/09/2022 02:45 AM    LABGLOM 52 09/09/2022 02:45 AM     Lab Results   Component Value Date/Time    PROTIME 11.5 03/11/2022 07:02 PM    INR 1.0 03/11/2022 07:02 PM     Recent Labs     09/08/22  1142   PROBNP 7,870*     Recent Labs     09/08/22  1357   PROCAL 0.04     This SmartLink has not been configured with any valid records. Micro:  No results for input(s): CULTRESP in the last 72 hours.   Recent Labs     09/08/22  1440   LABGRAM Refer to ordered Gram stain for results     No results for input(s): LEGUR in the last 72 hours. Recent Labs     09/08/22  1840   STREPNEUMAGU Presumptive negative- suggests no current or recent  pneumococcal infection. Infection due to Strep pneumoniae cannot be  ruled out since the antigen present in the sample  may be below the detection limit of the test.  Normal Range:Presumptive Negative       Recent Labs     09/08/22  1840   LP1UAG Presumptive Negative -suggesting no recent or current infections  with Legionella pneumophila serogroup 1. Infection to Legionella cannot be ruled out since other serogroups  and species may cause infection, antigen may not be present in  early infection, or level of antigen may be below the  detection limit. Normal Range: Presumptive Negative         9/8/22  Pleural fluid analysis   Latest Reference Range & Units 9/8/22 14:40 9/8/22 14:47   Source + CELL CT/DIFF-BF  Pleural    Appearance, Fluid  Cloudy    Color, Fluid  Yellow    Fluid Type  Pleural    Glucose, Fluid Not Established mg/dL 120    LD, Fluid Not Established U/L 530    RBC, Fluid /uL 2,000    Monocyte Count, Fluid % 67    Neutrophil Count, Fluid % 33    Nucl Cell, Fluid /uL 7,844    pH, Fluid   7.389   Protein, Fluid Not Established g/dL 3.3    Triglycerides, Fluid Not Established mg/dL 14       Assessment:    R hydropneumothorax , possible uncomplicated parapneumonic effusion? R thoracentesis 9/8/22 at bedside by Dr Carrol Tucker. 400 cc removed. Fluid exudate. Pneumonia  NSTEMI  Hx decompensated heart failure  Hx subdural hematoma CT head April 2022  Hx presyncope   CKD stage 3 b Baseline Cr 1.4-1.6. Hx  CABG and AVR  HTN    Plan:   Fluid studies-exudate. Await cultures and cytology. Follow CXR. Post procedure residual small R apical PTX  Bacterial antigens negative. Respiratory viral panel negative. Blood cultures pending. CRP 3.7   Continue Zosyn day 2 , for CAP coverage . Cultures negative so far. Procal low 0.04. await pleural fluid culture results.  Switch to augmentin for dc  DVT, GI prophylaxis    This plan of care was reviewed in collaboration with Dr. Jaden Martines  Electronically signed by ABBY Martinez CNP on 9/9/2022 at 11:00 AM      Addendum:    Patient's repeat chest x-ray today shows stable apical pneumothorax. Pleural effusion and history consistent with exudative effusion. We will follow final gram stain and cultures. We will continue Zosyn. Patient can be switched to Augmentin for 10 more days upon discharge. Will need ambulatory pulse oximetry prior to discharge. I personally saw, examined and provided care for the patient. Radiographs, labs and medication list were reviewed by me independently. I spoke with bedside nursing, therapists and consultants. The case was discussed in detail and plans for care were established. Review of CNP documentation was conducted and revisions were made as appropriate. I agree with the above documented exam, problem list and plan of care.    Eber Nolan MD

## 2022-09-09 NOTE — PROGRESS NOTES
P Quality Flow/Interdisciplinary Rounds Progress Note        Quality Flow Rounds held on September 9, 2022    Disciplines Attending:  Bedside Nurse, , , and Nursing Unit Leadership    George Kurtz was admitted on 9/8/2022 11:24 AM    Anticipated Discharge Date:       Disposition:    Gilberto Score:  Gilberto Scale Score: 20    Readmission Risk              Risk of Unplanned Readmission:  18           Discussed patient goal for the day, patient clinical progression, and barriers to discharge. The following Goal(s) of the Day/Commitment(s) have been identified:  PT/OT to see, check Pulmonary plan.       Tricia Watson RN  September 9, 2022

## 2022-09-09 NOTE — PLAN OF CARE
Problem: ABCDS Injury Assessment  Goal: Absence of physical injury  9/9/2022 0803 by Atiya Finch RN  Outcome: Progressing  9/8/2022 2034 by Shirlene Gallegos RN  Outcome: Progressing  9/8/2022 1840 by Atiya Finch RN  Outcome: Progressing  Flowsheets  Taken 9/8/2022 1730  Absence of Physical Injury: Implement safety measures based on patient assessment  Taken 9/8/2022 1657  Absence of Physical Injury: Implement safety measures based on patient assessment     Problem: Pain  Goal: Verbalizes/displays adequate comfort level or baseline comfort level  9/9/2022 0803 by Atiya Finch RN  Outcome: Progressing  Flowsheets (Taken 9/9/2022 0800)  Verbalizes/displays adequate comfort level or baseline comfort level: Encourage patient to monitor pain and request assistance  9/8/2022 2034 by Shirlene Gallegos RN  Outcome: Progressing  9/8/2022 1840 by Atiya Finch RN  Outcome: Progressing  Flowsheets (Taken 9/8/2022 1730)  Verbalizes/displays adequate comfort level or baseline comfort level:   Encourage patient to monitor pain and request assistance   Assess pain using appropriate pain scale     Problem: Discharge Planning  Goal: Discharge to home or other facility with appropriate resources  9/9/2022 0803 by Atiya Finch RN  Outcome: Progressing  9/8/2022 2034 by Shirlene Gallegos RN  Outcome: Progressing  9/8/2022 1840 by Atiya Finch RN  Outcome: Progressing  Flowsheets (Taken 9/8/2022 1730)  Discharge to home or other facility with appropriate resources: Identify barriers to discharge with patient and caregiver     Problem: Safety - Adult  Goal: Free from fall injury  9/9/2022 0803 by Atiya Finch RN  Outcome: Progressing  9/8/2022 2034 by Shirlene Gallegos RN  Outcome: Progressing  9/8/2022 1840 by Atiya Finch RN  Outcome: Progressing  Flowsheets (Taken 9/8/2022 1730)  Free From Fall Injury: Instruct family/caregiver on patient safety

## 2022-09-09 NOTE — PROGRESS NOTES
Physical Therapy  Facility/Department: [facility]  Physical Therapy Initial Assessment    Name: Samuel Perez  : 1930  MRN: [id number]  Date of Service: 2022      Attending Provider:  Liz Giron DO    Evaluating PT:  Jose J Navarro. Ivory Xie P.T. Room #:  02/0602-A  Diagnosis:  Pneumothorax on right [J93.9]  Procedure/Surgery:  R thoracentesis 22  Precautions:  falls, bed/chair alarm    SUBJECTIVE:    Pt lives at [facility] and ambulated with a ww short distances PTA. He states he has been using WC for trips to the [facility] and for longer distances. OBJECTIVE:   Initial Evaluation  Date: 22 Treatment Short Term/ Long Term   Goals   Was pt agreeable to Eval/treatment? yes     Does pt have pain? No c/o pain     Bed Mobility  Rolling: supervision  Supine to sit: supervision  Sit to supine: NA  Scooting: supervision  Independent    Transfers Sit to stand: SBA  Stand to sit: SBA  Stand pivot: SBA with ww  supervision   Ambulation   30+3 feet with ww SBA/CGA  50 feet with ww SBA   Stair negotiation: ascended and descended NA  NA   AM-PAC 6 Clicks        BLE ROM is WFL. BLE strength is grossly 4-/5 to 4/5. Sensation:  Pt c/o numbness B feet  Balance: sitting is Independent and standing with ww is SBA  Endurance: fair    ASSESSMENT:    Conditions Requiring Skilled Therapeutic Intervention:    [x]Decreased strength     []Decreased ROM  [x]Decreased functional mobility  [x]Decreased balance   [x]Decreased endurance   []Decreased posture  []Decreased sensation  []Decreased coordination   []Decreased vision  []Decreased safety awareness   []Increased pain       Comments:  Pt was in bed and agreeable to PT. He required 2 attempts, but was able to stand up from the bed on his second attempt with SBA. He walked with mild unsteady gait, but had no significant LOB. SBA/CGA was provided to pt for safety while amb to reduces his risk of falling. Pt sat on EOB and asked to sit up in a chair.   A chair was placed next to pt's bed. He stood with ww and walked 3 feet to chair and sat down. Pt moved slowly, but demonstrated safety awareness. Treatment:  Patient practiced and was instructed in the following treatment:    Bed mobility, transfers, and gait with ww to improve functional strength and endurance. Pt was left sitting up in chair on waffle cushion with call light left by patient. Chair/bed alarm: chair alarm was activated. Pt's/ family goals   1. To feel better and return to [facility]. Patient and or family understand(s) diagnosis, prognosis, and plan of care. PHYSICAL THERAPY PLAN OF CARE:    PT POC is established based on physician order and patient diagnosis     Referring provider/PT Order:  PT eval and treat  Diagnosis:  Pneumothorax on right [J93.9]  Specific instructions for next treatment: To increase amb distance as pt is able. Current Treatment Recommendations:     [x] Strengthening to improve independence with functional mobility   [] ROM to improve ROM and decrease spasm and pain which will help promote independence with functional mobility   [x] Balance Training to improve static/dynamic balance and to reduce fall risk  [x] Endurance Training to improve activity tolerance during functional mobility   [x] Transfer Training to improve safety and independence with all functional transfers   [x] Gait Training to improve gait mechanics, endurance and assess need for appropriate assistive device  [] Stair Training in preparation for safe discharge home and/or into the community   [] Positioning to prevent skin breakdown and contractures  [] Safety and Education Training   [x] Patient/Caregiver Education   [] HEP  [] Other     PT long term treatment goals are located in above grid    Frequency of treatments: 2-5x/week x 1-2 weeks.     Time in  11:50  Time out  12:15    Total Treatment Time 10 minutes     Evaluation Time includes thorough review of current medical information, gathering information on past medical history/social history and prior level of function, completion of standardized testing/informal observation of tasks, assessment of data and education on plan of care and goals. CPT codes:  [x] Low Complexity PT evaluation 49656  [] Moderate Complexity PT evaluation 82054  [] High Complexity PT evaluation 28592  [] PT Re-evaluation 91253  [] Gait training 89016 ** minutes  [] Manual therapy 37508 ** minutes  [x] Therapeutic activities 90496 10 minutes  [] Therapeutic exercises 00908 ** minutes  [] Neuromuscular reeducation 53902 ** minutes     Koko Oliva., P.T.   License Number: [id number]

## 2022-09-09 NOTE — PLAN OF CARE
Problem: ABCDS Injury Assessment  Goal: Absence of physical injury  9/8/2022 2034 by Ling Singleton RN  Outcome: Progressing  9/8/2022 1840 by Lesley Valverde RN  Outcome: Progressing  Flowsheets  Taken 9/8/2022 1730  Absence of Physical Injury: Implement safety measures based on patient assessment  Taken 9/8/2022 1657  Absence of Physical Injury: Implement safety measures based on patient assessment     Problem: Pain  Goal: Verbalizes/displays adequate comfort level or baseline comfort level  9/8/2022 2034 by Ling Singleton RN  Outcome: Progressing  9/8/2022 1840 by Lesley Valverde RN  Outcome: Progressing  Flowsheets (Taken 9/8/2022 1730)  Verbalizes/displays adequate comfort level or baseline comfort level:   Encourage patient to monitor pain and request assistance   Assess pain using appropriate pain scale     Problem: Discharge Planning  Goal: Discharge to home or other facility with appropriate resources  9/8/2022 2034 by Ling Singleton RN  Outcome: Progressing  9/8/2022 1840 by Lesley Valverde RN  Outcome: Progressing  Flowsheets (Taken 9/8/2022 1730)  Discharge to home or other facility with appropriate resources: Identify barriers to discharge with patient and caregiver     Problem: Safety - Adult  Goal: Free from fall injury  9/8/2022 2034 by Ling Singleton RN  Outcome: Progressing  9/8/2022 1840 by Lesley Valverde RN  Outcome: Progressing  Flowsheets (Taken 9/8/2022 1730)  Free From Fall Injury: Instruct family/caregiver on patient safety

## 2022-09-10 ENCOUNTER — APPOINTMENT (OUTPATIENT)
Dept: GENERAL RADIOLOGY | Age: 87
DRG: 199 | End: 2022-09-10
Payer: MEDICARE

## 2022-09-10 VITALS
TEMPERATURE: 97.9 F | RESPIRATION RATE: 16 BRPM | WEIGHT: 152.8 LBS | BODY MASS INDEX: 21.88 KG/M2 | HEIGHT: 70 IN | OXYGEN SATURATION: 100 % | SYSTOLIC BLOOD PRESSURE: 119 MMHG | DIASTOLIC BLOOD PRESSURE: 57 MMHG | HEART RATE: 78 BPM

## 2022-09-10 LAB
ANION GAP SERPL CALCULATED.3IONS-SCNC: 10 MMOL/L (ref 7–16)
BASOPHILS ABSOLUTE: 0.08 E9/L (ref 0–0.2)
BASOPHILS RELATIVE PERCENT: 0.9 % (ref 0–2)
BUN BLDV-MCNC: 20 MG/DL (ref 6–23)
CALCIUM SERPL-MCNC: 8.5 MG/DL (ref 8.6–10.2)
CHLORIDE BLD-SCNC: 100 MMOL/L (ref 98–107)
CO2: 22 MMOL/L (ref 22–29)
CREAT SERPL-MCNC: 1.3 MG/DL (ref 0.7–1.2)
EOSINOPHILS ABSOLUTE: 1.56 E9/L (ref 0.05–0.5)
EOSINOPHILS RELATIVE PERCENT: 17.5 % (ref 0–6)
GFR AFRICAN AMERICAN: >60
GFR NON-AFRICAN AMERICAN: 52 ML/MIN/1.73
GLUCOSE BLD-MCNC: 115 MG/DL (ref 74–99)
HCT VFR BLD CALC: 30.1 % (ref 37–54)
HEMOGLOBIN: 10 G/DL (ref 12.5–16.5)
IMMATURE GRANULOCYTES #: 0.02 E9/L
IMMATURE GRANULOCYTES %: 0.2 % (ref 0–5)
LYMPHOCYTES ABSOLUTE: 1.98 E9/L (ref 1.5–4)
LYMPHOCYTES RELATIVE PERCENT: 22.2 % (ref 20–42)
MCH RBC QN AUTO: 30.3 PG (ref 26–35)
MCHC RBC AUTO-ENTMCNC: 33.2 % (ref 32–34.5)
MCV RBC AUTO: 91.2 FL (ref 80–99.9)
MONOCYTES ABSOLUTE: 1.04 E9/L (ref 0.1–0.95)
MONOCYTES RELATIVE PERCENT: 11.6 % (ref 2–12)
NEUTROPHILS ABSOLUTE: 4.25 E9/L (ref 1.8–7.3)
NEUTROPHILS RELATIVE PERCENT: 47.6 % (ref 43–80)
PDW BLD-RTO: 14.2 FL (ref 11.5–15)
PLATELET # BLD: 240 E9/L (ref 130–450)
PMV BLD AUTO: 9.8 FL (ref 7–12)
POTASSIUM REFLEX MAGNESIUM: 3.8 MMOL/L (ref 3.5–5)
RBC # BLD: 3.3 E12/L (ref 3.8–5.8)
SARS-COV-2, NAAT: NOT DETECTED
SODIUM BLD-SCNC: 132 MMOL/L (ref 132–146)
WBC # BLD: 8.9 E9/L (ref 4.5–11.5)

## 2022-09-10 PROCEDURE — 99239 HOSP IP/OBS DSCHRG MGMT >30: CPT | Performed by: INTERNAL MEDICINE

## 2022-09-10 PROCEDURE — 6370000000 HC RX 637 (ALT 250 FOR IP): Performed by: NURSE PRACTITIONER

## 2022-09-10 PROCEDURE — 85025 COMPLETE CBC W/AUTO DIFF WBC: CPT

## 2022-09-10 PROCEDURE — 36415 COLL VENOUS BLD VENIPUNCTURE: CPT

## 2022-09-10 PROCEDURE — 80048 BASIC METABOLIC PNL TOTAL CA: CPT

## 2022-09-10 PROCEDURE — 2580000003 HC RX 258: Performed by: PHYSICIAN ASSISTANT

## 2022-09-10 PROCEDURE — 71045 X-RAY EXAM CHEST 1 VIEW: CPT

## 2022-09-10 PROCEDURE — 6370000000 HC RX 637 (ALT 250 FOR IP): Performed by: PHYSICIAN ASSISTANT

## 2022-09-10 PROCEDURE — 87635 SARS-COV-2 COVID-19 AMP PRB: CPT

## 2022-09-10 RX ORDER — AMOXICILLIN AND CLAVULANATE POTASSIUM 875; 125 MG/1; MG/1
1 TABLET, FILM COATED ORAL EVERY 12 HOURS SCHEDULED
Qty: 13 TABLET | Refills: 0 | DISCHARGE
Start: 2022-09-10 | End: 2022-09-17

## 2022-09-10 RX ADMIN — BRIMONIDINE TARTRATE 1 DROP: 2 SOLUTION OPHTHALMIC at 08:44

## 2022-09-10 RX ADMIN — ASPIRIN 81 MG CHEWABLE TABLET 81 MG: 81 TABLET CHEWABLE at 08:44

## 2022-09-10 RX ADMIN — SODIUM CHLORIDE, PRESERVATIVE FREE 10 ML: 5 INJECTION INTRAVENOUS at 08:46

## 2022-09-10 RX ADMIN — Medication 2000 UNITS: at 08:43

## 2022-09-10 RX ADMIN — ACETAMINOPHEN 650 MG: 325 TABLET ORAL at 08:43

## 2022-09-10 RX ADMIN — FOLIC ACID 1 MG: 1 TABLET ORAL at 08:43

## 2022-09-10 RX ADMIN — AMOXICILLIN AND CLAVULANATE POTASSIUM 1 TABLET: 875; 125 TABLET, FILM COATED ORAL at 08:43

## 2022-09-10 RX ADMIN — FAMOTIDINE 20 MG: 20 TABLET ORAL at 08:44

## 2022-09-10 RX ADMIN — METOPROLOL SUCCINATE 12.5 MG: 25 TABLET, EXTENDED RELEASE ORAL at 08:43

## 2022-09-10 NOTE — PLAN OF CARE
Problem: ABCDS Injury Assessment  Goal: Absence of physical injury  Outcome: Progressing     Problem: Pain  Goal: Verbalizes/displays adequate comfort level or baseline comfort level  Outcome: Progressing  Flowsheets (Taken 9/9/2022 2314 by Thor Campa RN)  Verbalizes/displays adequate comfort level or baseline comfort level:   Encourage patient to monitor pain and request assistance   Assess pain using appropriate pain scale     Problem: Discharge Planning  Goal: Discharge to home or other facility with appropriate resources  Outcome: Progressing     Problem: Safety - Adult  Goal: Free from fall injury  Outcome: Progressing

## 2022-09-10 NOTE — PROGRESS NOTES
Thi from Irvin @ [facility] returned call. They are accepting pt back. Nurse to nurse report given. Pt & son, Terrial Gravely, updated.

## 2022-09-10 NOTE — DISCHARGE INSTR - COC
Continuity of Care Form    Patient Name: George Kurtz   :  1930  MRN:  [id number]    Admit date:  2022  Discharge date:  9/10/22    Code Status Order: DNR-CCA   Advance Directives:     Admitting Physician:  Carolina Desai DO  PCP: Rachelle Sandhu MD    Discharging Nurse: Donya Lindo Unit/Room#: 0602/0602-A  Discharging Unit Phone Number: [phone]    Emergency Contact:   Extended Emergency Contact Information  Primary Emergency Contact: José Antonio Smalls  Address: [address] [place] [address] Phone: [phone]  Mobile Phone: [phone]  Relation: Child  Preferred language: English   needed? No    Past Surgical History:  Past Surgical History:   Procedure Laterality Date    APPENDECTOMY      BACK SURGERY      CABG WITH AORTIC VALVE REPAIR         Immunization History:   Immunization History   Administered Date(s) Administered    Td (Adult), 2 Lf Tetanus Toxoid, Pf (Td, Absorbed) 2022       Active Problems:  Patient Active Problem List   Diagnosis Code    Subdural hematoma ([facility] [address] S06.5X9A    Scalp laceration S01. 01XA    Gastroenteritis K52.9    Nausea vomiting and diarrhea R11.2, R19.7    Unilateral inguinal hernia without obstruction or gangrene K40.90    Diarrhea R19.7    Primary hypertension I10    Pure hypercholesterolemia E78.00    Stage 3a chronic kidney disease (HCC) N18.31    S/P AVR Z95.2    History of subarachnoid hemorrhage Z86.79    Rotavirus enteritis A08.0    Non-ST elevation myocardial infarction (NSTEMI) ([facility]) I21.4    Coronary artery disease involving native coronary artery of native heart without angina pectoris I25.10    Aortic valve disease I35.9    Stage 3b chronic kidney disease (HCC) N18.32    Near syncope R55    Acute decompensated heart failure (HCC) I50.9    Pneumothorax on right J93.9    Hydropneumothorax J94.8    Bacterial pneumonia J15.9    Hyperlipidemia E78.5       Isolation/Infection: Isolation            No Isolation          Patient Infection Status       Infection Onset Added Last Indicated Last Indicated By Review Planned Expiration Resolved Resolved By    None active    Resolved    COVID-19 (Rule Out) 09/08/22 09/08/22 09/08/22 Respiratory Panel, Molecular, with COVID-19 (Restricted: peds pts or suitable admitted adults) (Ordered)   09/08/22 Rule-Out Test Resulted    COVID-19 (Rule Out) 07/09/22 07/09/22 07/09/22 COVID-19, Rapid (Ordered)   07/09/22 Rule-Out Test Resulted    Rotavirus 05/04/22 05/05/22 05/04/22 ROTAVIRUS ANTIGEN, STOOL   07/10/22 Eric Miller RN    COVID-19 (Rule Out) 05/03/22 05/03/22 05/03/22 Respiratory Panel, Molecular, with COVID-19 (Restricted: peds pts or suitable admitted adults) (Ordered)   05/03/22 Rule-Out Test Resulted    COVID-19 (Rule Out) 05/03/22 05/03/22 05/03/22 COVID-19, Rapid (Ordered)   05/03/22 Rule-Out Test Resulted    C-diff Rule Out 05/03/22 05/03/22 05/04/22 CLOSTRIDIUM DIFFICILE EIA (Ordered)   05/04/22 Rule-Out Test Resulted            Nurse Assessment:  Last Vital Signs: BP (!) 119/57   Pulse 78   Temp 97.9 °F (36.6 °C) (Oral)   Resp 16   Ht 5' 10\" (1.778 m)   Wt 152 lb 12.8 oz (69.3 kg)   SpO2 100%   BMI 21.92 kg/m²     Last documented pain score (0-10 scale): Pain Level: 0  Last Weight:   Wt Readings from Last 1 Encounters:   09/10/22 152 lb 12.8 oz (69.3 kg)     Mental Status:  {IP PT MENTAL STATUS:20030}    IV Access:  {[facility] IV ACCESS:380361561}    Nursing Mobility/ADLs:  Walking   Assisted  Transfer  Assisted  Bathing  Assisted  Dressing  Assisted  Toileting  Assisted  Feeding  Independent  Med Admin  Independent  Med Delivery   whole    Wound Care Documentation and Therapy:        Elimination:  Continence:    Bowel: Yes  Bladder: Yes  Urinary Catheter: None   Colostomy/Ileostomy/Ileal Conduit: No       Date of Last BM: 9/9/22    No intake or output data in the 24 hours ending 09/10/22 3941  No intake/output data recorded. Safety Concerns: At Risk for Falls    Impairments/Disabilities:      Vision    Nutrition Therapy:  Current Nutrition Therapy:   - Oral Diet:  General    Routes of Feeding: Oral  Liquids: Thin Liquids  Daily Fluid Restriction: no  Last Modified Barium Swallow with Video (Video Swallowing Test): not done    Treatments at the Time of Hospital Discharge:   Respiratory Treatments: N/A    Oxygen Therapy:  is not on home oxygen therapy. Ventilator:    - No ventilator support    Rehab Therapies: Physical Therapy and Occupational Therapy  Weight Bearing Status/Restrictions: No weight bearing restrictions  Other Medical Equipment (for information only, NOT a DME order):  wheelchair and walker  Other Treatments: N/A      Patient's personal belongings (please select all that are sent with patient):  {[facility] DME Belongings:828729407}    RN SIGNATURE:  Electronically signed by An Vanegas RN on 9/10/22 at 4:32 PM EDT    CASE MANAGEMENT/SOCIAL WORK SECTION    Inpatient Status Date: ***    Readmission Risk Assessment Score:  Readmission Risk              Risk of Unplanned Readmission:  21           Discharging to Facility/ Agency   Name:   Address:  Phone:  Fax:    Dialysis Facility (if applicable)   Name:  Address:  Dialysis Schedule:  Phone:  Fax:    / signature: {Esignature:978861060}    PHYSICIAN SECTION    Prognosis: {Prognosis:1286015126}    Condition at Discharge: [facility] Patient Condition:655593361}    Rehab Potential (if transferring to Rehab): {Prognosis:4409880927}    Recommended Labs or Other Treatments After Discharge: ***    Physician Certification: I certify the above information and transfer of Gilma Lerner  is necessary for the continuing treatment of the diagnosis listed and that he requires {Admit to Appropriate Level of Care:26040} for {GREATER/LESS:582520847} 30 days.      Update Admission H&P: {[facility] DME Changes in GAG[facility]:075030586}    PHYSICIAN SIGNATURE: {[facility]:715144216}

## 2022-09-10 NOTE — PROGRESS NOTES
Leonor Ernst Hospitalist   Progress Note    Admitting Date and Time: 9/8/2022 11:24 AM  Admit Dx: Pneumothorax on right [J93.9]    Subjective:    Patient was admitted with Pneumothorax on right [J93.9].  Patient denies fever, chills, cp, sob, n/v.     [START ON 9/10/2022] amoxicillin-clavulanate  1 tablet Oral 2 times per day    sodium chloride flush  5-40 mL IntraVENous 2 times per day    aspirin  81 mg Oral QAM    atorvastatin  20 mg Oral Nightly    vitamin D  2,000 Units Oral QAM    famotidine  20 mg Oral QAM    folic acid  1 mg Oral QAM    metoprolol succinate  12.5 mg Oral QAM    piperacillin-tazobactam  3,375 mg IntraVENous Q8H    brimonidine  1 drop Both Eyes Daily    latanoprost  1 drop Both Eyes Nightly     sodium chloride flush, 5-40 mL, PRN  sodium chloride, , PRN  ondansetron, 4 mg, Q8H PRN   Or  ondansetron, 4 mg, Q6H PRN  polyethylene glycol, 17 g, Daily PRN  acetaminophen, 650 mg, Q6H PRN   Or  acetaminophen, 650 mg, Q6H PRN         Objective:    BP (!) 145/75   Pulse 91   Temp 97.7 °F (36.5 °C) (Oral)   Resp 18   Ht 5' 10\" (1.778 m)   Wt 160 lb (72.6 kg)   SpO2 97%   BMI 22.96 kg/m²   Skin: warm and dry, no rash or erythema  Pulmonary/Chest: clear to auscultation bilaterally- no wheezes, rales or rhonchi, normal air movement, no respiratory distress  Cardiovascular: rhythm reg at rate of 88  Abdomen: soft, non-tender, non-distended, normal bowel sounds, no masses or organomegaly  Extremities: no cyanosis, no clubbing, and no edema      Recent Labs     09/08/22  1142 09/09/22  0245    135   K 4.4 4.5    103   CO2 24 21*   BUN 23 22   CREATININE 1.4* 1.3*   GLUCOSE 135* 100*   CALCIUM 8.4* 8.8       Recent Labs     09/08/22  1142 09/09/22  0245   WBC 8.9 10.0   RBC 3.29* 3.52*   HGB 10.1* 10.6*   HCT 30.9* 32.4*   MCV 93.9 92.0   MCH 30.7 30.1   MCHC 32.7 32.7   RDW 14.2 14.1    257   MPV 10.2 9.8       CBC with Differential:    Lab Results   Component Value Date/Time    WBC 10.0 09/09/2022 02:45 AM    RBC 3.52 09/09/2022 02:45 AM    HGB 10.6 09/09/2022 02:45 AM    HCT 32.4 09/09/2022 02:45 AM     09/09/2022 02:45 AM    MCV 92.0 09/09/2022 02:45 AM    MCH 30.1 09/09/2022 02:45 AM    MCHC 32.7 09/09/2022 02:45 AM    RDW 14.1 09/09/2022 02:45 AM    LYMPHOPCT 19.6 09/09/2022 02:45 AM    MONOPCT 9.6 09/09/2022 02:45 AM    BASOPCT 1.1 09/09/2022 02:45 AM    MONOSABS 0.96 09/09/2022 02:45 AM    LYMPHSABS 1.95 09/09/2022 02:45 AM    EOSABS 0.78 09/09/2022 02:45 AM    BASOSABS 0.11 09/09/2022 02:45 AM     BMP:    Lab Results   Component Value Date/Time     09/09/2022 02:45 AM    K 4.5 09/09/2022 02:45 AM     09/09/2022 02:45 AM    CO2 21 09/09/2022 02:45 AM    BUN 22 09/09/2022 02:45 AM    LABALBU 3.3 07/10/2022 08:48 AM    CREATININE 1.3 09/09/2022 02:45 AM    CALCIUM 8.8 09/09/2022 02:45 AM    GFRAA >60 09/09/2022 02:45 AM    LABGLOM 52 09/09/2022 02:45 AM    GLUCOSE 100 09/09/2022 02:45 AM        Radiology:   XR CHEST PORTABLE   Final Result   Stable right apical pneumothorax. Stable right lung opacities. XR CHEST PORTABLE   Final Result   Redemonstration of significant parenchymal changes in the right lung. Improved but residual right apical pneumothorax. Decreased right pleural   effusion. CT CHEST WO CONTRAST   Final Result   Mildly enlarged mediastinal lymph nodes. Consolidation and collapse along with ground-glass opacities in all lobes of   the right lung. Right hydropneumothorax. Patchy infiltrative changes in the left lung base. Postsurgical changes and other chronic findings as described. XR CHEST PORTABLE   Final Result   30-40% right-sided pneumothorax. Diffuse right lung pneumonia. Small right   pleural effusion cannot be excluded. The findings were discussed with Dr. Pattie Whitten at 12:18 p.m.          XR CHEST PORTABLE    (Results Pending)       Assessment:    Principal Problem: Pneumothorax on right  Active Problems:    Hydropneumothorax    Bacterial pneumonia    Hyperlipidemia  Resolved Problems:    * No resolved hospital problems. *      Plan:  Hydropneumothorax pt s/p thoracentesis. Cxr noted. Pulmonary following  Possible bacterial pneumonia with exudative pleural effusion. Continue abx.    Acidosis monitor  Htn continue med  Hyperlipidemia continue med        Electronically signed by Graciela Ernandez DO on 9/9/2022 at 8:08 PM

## 2022-09-10 NOTE — PROGRESS NOTES
RN attempted to call nurse to nurse to [facility] at [facility]. This RN was told by a nurse there that pt was not returning to [facility] @ [facility] but going to rehab. Nurse @ [facility] will call  & clarify then return call to [facility]. PAS ambulette cancelled at this time. Pt & son, Jailene Moore notified.

## 2022-09-10 NOTE — PROGRESS NOTES
Darcy Floyd M.D.,[facility]  Dipika Barker D.O., F.A.C.O.I., Zeferino Loco M.D. Neeta Spear M.D. Yung Yoo D.O. Daily Pulmonary Progress Note    Patient:  Jaron Whyte 80 y.o. male MRN: [id number]     Date of Service: 9/10/2022      Synopsis     We are following patient for hydropneumothorax    \"CC\" fatigue    Code status: DNR CCA      Subjective      Patient was seen and examined. Feeling good today. On room air. No acute events overnight. Review of Systems:  Constitutional: Denies fever, weight loss, night sweats, Improved fatigue  Skin: Denies pigmentation, dark lesions, and rashes   HEENT: Denies hearing loss, tinnitus, ear drainage, epistaxis, sore throat, and hoarseness. Cardiovascular: Denies palpitations, chest pain, and chest pressure. Respiratory: Denies cough, dyspnea at rest, hemoptysis, apnea, and choking.   Gastrointestinal: Denies nausea, vomiting, poor appetite, diarrhea, heartburn or reflux  Genitourinary: Denies dysuria, frequency, urgency or hematuria  Musculoskeletal: Denies myalgias, muscle weakness, and bone pain  Neurological: Denies dizziness, vertigo, headache, and focal weakness  Psychological: Denies anxiety and depression  Endocrine: Denies heat intolerance and cold intolerance  Hematopoietic/Lymphatic: Denies bleeding problems and blood transfusions    24-hour events:  None     Objective   Vitals: BP (!) 129/56   Pulse 84   Temp 98 °F (36.7 °C) (Oral)   Resp 16   Ht 5' 10\" (1.778 m)   Wt 152 lb 12.8 oz (69.3 kg)   SpO2 96%   BMI 21.92 kg/m²     I/O:  No intake or output data in the 24 hours ending 09/10/22 1116                       CURRENT MEDS :  Scheduled Meds:   amoxicillin-clavulanate  1 tablet Oral 2 times per day    sodium chloride flush  5-40 mL IntraVENous 2 times per day    aspirin  81 mg Oral QAM    atorvastatin  20 mg Oral Nightly    vitamin D  2,000 Units Oral QAM    famotidine  20 mg Oral QAM    folic acid  1 mg Oral QAM Reference Range & Units 9/8/22 14:40 9/8/22 14:47   Source + CELL CT/DIFF-BF  Pleural    Appearance, Fluid  Cloudy    Color, Fluid  Yellow    Fluid Type  Pleural    Glucose, Fluid Not Established mg/dL 120    LD, Fluid Not Established U/L 530    RBC, Fluid /uL 2,000    Monocyte Count, Fluid % 67    Neutrophil Count, Fluid % 33    Nucl Cell, Fluid /uL 7,844    pH, Fluid   7.389   Protein, Fluid Not Established g/dL 3.3    Triglycerides, Fluid Not Established mg/dL 14       Assessment:    R hydropneumothorax , possible uncomplicated parapneumonic effusion? R thoracentesis 9/8/22 at bedside by Dr Miriam Negrete. 400 cc removed. Fluid exudate. Pneumonia  NSTEMI  Hx decompensated heart failure  Hx subdural hematoma CT head April 2022  Hx presyncope   CKD stage 3 b Baseline Cr 1.4-1.6. Hx  CABG and AVR  HTN    Plan:   Fluid studies-exudate. So far gram stain and cultures are negative  's x-ray today reviewed. Stable residual small R apical PTX  Bacterial antigens negative. Respiratory viral panel negative. Blood cultures pending. CRP 3.7   Patient can be switched to Augmentin 875/125 twice daily for 8 more days  DVT, GI prophylaxis    This plan of care was reviewed in collaboration with Dr. Miriam Negrete  Electronically signed by Keagan Lemus MD on 9/10/2022 at 11:16 AM      I personally saw, examined, and cared for the patient. Labs, medications, radiographs reviewed. I agree with history exam and plans detailed in NP note.    Keagan Lemus MD

## 2022-09-10 NOTE — DISCHARGE SUMMARY
[facility] Hospitalist       Hospitalist Physician Discharge Summary       No follow-up provider specified. Activity level: As Tolerated    Diet: ADULT DIET; Regular    Condition at discharge: Stable    Dispo:AL         Patient ID:  Paty Cowart  [id number]  54 y.o.  1/16/1930    Admit date: 9/8/2022    Discharge date and time:  9/10/2022  3:24 PM    Admission Diagnoses: Principal Problem:    Pneumothorax on right  Active Problems:    Hydropneumothorax    Bacterial pneumonia    Hyperlipidemia  Resolved Problems:    * No resolved hospital problems. *      Discharge Diagnoses: Principal Problem:    Pneumothorax on right  Active Problems:    Hydropneumothorax    Bacterial pneumonia    Hyperlipidemia  Resolved Problems:    * No resolved hospital problems. *      Consults:  IP CONSULT TO PULMONOLOGY    Procedures:  9/8/22 Thoracentesis    Hospital Course:     58/22 80year old male presented to [address] [place] [address] ED with complaints of CP. CXR reveals right-sided pneumothorax. Patient placed on nonrebreather Pulmonology consulted per ED. Decision to admit pt for further evaluation and treatment. 9/8/22 Thoracentesis performed with 400cc removed. Fluid studies-exudate. Await cultures and cytology. Bacterial antigens negative. Respiratory viral panel negative. Pt received Zosyn and transitioned to Augmentin at DC/ Pt remained ORA. He is hemodynamically stable and can DC at this time. Pt will be instructed to follow up with PCP upon DC.      Discharge Exam:  Vitals:    09/10/22 0134 09/10/22 0700 09/10/22 0843 09/10/22 1433   BP:  (!) 129/56  (!) 119/57   Pulse:  84  78   Resp:  16  16   Temp:  98 °F (36.7 °C)  97.9 °F (36.6 °C)   TempSrc:  Oral  Oral   SpO2:  95% 96% 100%   Weight: 152 lb 12.8 oz (69.3 kg)      Height:           General Appearance: alert and oriented to person, place and time, well-developed and well-nourished, in no acute distress and alert and oriented to person, place and time  Skin: warm and dry, no rash or erythema  Head: normocephalic and atraumatic  Eyes: pupils equal, round, and reactive to light, extraocular eye movements intact, conjunctivae normal  ENT: tympanic membrane, external ear and ear canal normal bilaterally, oropharynx clear and moist with normal mucous membranes  Neck: neck supple and non tender without mass, no thyromegaly or thyroid nodules, no cervical lymphadenopathy   Pulmonary/Chest: clear to auscultation bilaterally- no wheezes, rales or rhonchi, normal air movement, no respiratory distress  Cardiovascular: normal rate, normal S1 and S2, no gallops, intact distal pulses, and no carotid bruits  Abdomen: soft, non-tender, non-distended, normal bowel sounds, no masses or organomegaly  Extremities: no cyanosis and no clubbing  Neurologic: gait and coordination normal and speech normal  No intake/output data recorded. No intake/output data recorded. LABS:  Recent Labs     09/08/22  1142 09/09/22  0245 09/10/22  0245    135 132   K 4.4 4.5 3.8    103 100   CO2 24 21* 22   BUN 23 22 20   CREATININE 1.4* 1.3* 1.3*   GLUCOSE 135* 100* 115*   CALCIUM 8.4* 8.8 8.5*       Recent Labs     09/08/22  1142 09/09/22  0245 09/10/22  0245   WBC 8.9 10.0 8.9   RBC 3.29* 3.52* 3.30*   HGB 10.1* 10.6* 10.0*   HCT 30.9* 32.4* 30.1*   MCV 93.9 92.0 91.2   MCH 30.7 30.1 30.3   MCHC 32.7 32.7 33.2   RDW 14.2 14.1 14.2    257 240   MPV 10.2 9.8 9.8       No results for input(s): POCGLU in the last 72 hours. Imaging:  CT CHEST WO CONTRAST    Result Date: 9/8/2022  EXAMINATION: CT OF THE CHEST WITHOUT CONTRAST 9/8/2022 12:25 pm TECHNIQUE: CT of the chest was performed without the administration of intravenous contrast. Multiplanar reformatted images are provided for review. Automated exposure control, iterative reconstruction, and/or weight based adjustment of the mA/kV was utilized to reduce the radiation dose to as low as reasonably achievable. COMPARISON: None.  HISTORY: ORDERING SYSTEM PROVIDED HISTORY: pneumothorax TECHNOLOGIST PROVIDED HISTORY: Reason for exam:->pneumothorax Decision Support Exception - unselect if not a suspected or confirmed emergency medical condition->Emergency Medical Condition (MA) FINDINGS: The patient is status post sternotomy. The heart is normal in size. Extensive atherosclerotic changes are noted including coronary calcification. There are mildly enlarged mediastinal lymph nodes. There is no hiatal hernia. The central airways appear unremarkable. There is consolidation and collapse along with ground-glass opacities in all lobes of the right lung. There is a right hydropneumothorax. Patchy infiltrative changes are seen in the left lung base. No pneumothorax or pleural effusion is visualized on the left. There are postsurgical changes related to thoracolumbar spinal decompression and fusion. The thoracic spine demonstrates degenerative changes and curvature. There is compression deformity of the T7 vertebral body. The imaged upper abdomen appears unremarkable. Mildly enlarged mediastinal lymph nodes. Consolidation and collapse along with ground-glass opacities in all lobes of the right lung. Right hydropneumothorax. Patchy infiltrative changes in the left lung base. Postsurgical changes and other chronic findings as described. XR CHEST PORTABLE    Result Date: 9/9/2022  EXAMINATION: ONE XRAY VIEW OF THE CHEST 9/9/2022 2:18 pm COMPARISON: 09/08/2022 HISTORY: ORDERING SYSTEM PROVIDED HISTORY: apical pneumothorax TECHNOLOGIST PROVIDED HISTORY: Reason for exam:->apical pneumothorax FINDINGS: Stable right lung opacities. There is no effusion. Stable right apical pneumothorax. The cardiomediastinal silhouette is without acute process. The osseous structures are without acute process. Stable right apical pneumothorax. Stable right lung opacities.      XR CHEST PORTABLE    Result Date: 9/8/2022  EXAMINATION: ONE XRAY VIEW OF THE CHEST 9/8/2022 2:56 pm COMPARISON: 09/08/2022. HISTORY: ORDERING SYSTEM PROVIDED HISTORY: Thoracentesis TECHNOLOGIST PROVIDED HISTORY: Reason for exam:->Thoracentesis FINDINGS: The patient is status post sternotomy. The cardiac silhouette is unchanged in size. Significant parenchymal changes are again seen in the right lung. There is a right apical pneumothorax which has decreased in size. There has also been interval decrease in size of a right pleural effusion. Redemonstration of significant parenchymal changes in the right lung. Improved but residual right apical pneumothorax. Decreased right pleural effusion. XR CHEST PORTABLE    Result Date: 9/8/2022  EXAMINATION: ONE XRAY VIEW OF THE CHEST 9/8/2022 12:01 pm COMPARISON: The previous study performed 07/09/2022. HISTORY: ORDERING SYSTEM PROVIDED HISTORY: chest pain TECHNOLOGIST PROVIDED HISTORY: Reason for exam:->chest pain FINDINGS: There is an approximately 30-40% right-sided pneumothorax. There are airspace opacities noted within the aerated right lung, indicating pneumonia. The left lung is clear. A small right pleural effusion cannot be excluded. The cardiac silhouette and mediastinal structures are unremarkable. Again seen is buckling of the trachea to the right, secondary to a prominent aortic knob. The patient is again status post cardiac surgery. The patient is again status post thoracolumbar spine surgery. 30-40% right-sided pneumothorax. Diffuse right lung pneumonia. Small right pleural effusion cannot be excluded. The findings were discussed with Dr. Clau Rivas at 12:18 p.m.          Patient Instructions:   Current Discharge Medication List        START taking these medications    Details   amoxicillin-clavulanate (AUGMENTIN) 875-125 MG per tablet Take 1 tablet by mouth every 12 hours for 13 doses  Qty: 13 tablet, Refills: 0           CONTINUE these medications which have NOT CHANGED    Details   Menthol, Topical Analgesic, (BIOFREEZE) 4 % GEL Apply topically every 6 hours as needed (RIGHT KNEE PAIN)      aspirin 81 MG chewable tablet Take 81 mg by mouth every morning      metoprolol succinate (TOPROL XL) 25 MG extended release tablet Take 12.5 mg by mouth every morning Indications: -HOLD IF SBP<100 OR P<60-      Lactobacillus (ACIDOPHILUS) TABS Take 1 tablet by mouth every morning      brimonidine-timolol (COMBIGAN) 0.2-0.5 % ophthalmic solution Place 1 drop into both eyes 2 times daily      Cholecalciferol (VITAMIN D3) 50 MCG (2000 UT) TABS Take 2,000 Units by mouth every morning      famotidine (PEPCID) 20 MG tablet Take 20 mg by mouth every morning      vitamin B-12 (CYANOCOBALAMIN) 1000 MCG tablet Take 1,000 mcg by mouth every morning      vitamin B-6 (PYRIDOXINE) 100 MG tablet Take 100 mg by mouth every morning      Folic Acid 0.8 MG CAPS Take 0.8 mg by mouth every morning      atorvastatin (LIPITOR) 20 MG tablet Take 20 mg by mouth nightly      Travoprost, BAK Free, (TRAVATAN Z) 0.004 % SOLN ophthalmic solution Place 1 drop into both eyes nightly           STOP taking these medications       nitroGLYCERIN (NITROSTAT) 0.4 MG SL tablet Comments:   Reason for Stopping:         bismuth subsalicylate (PEPTO BISMOL) 262 MG/15ML suspension Comments:   Reason for Stopping:         loperamide (IMODIUM) 2 MG capsule Comments:   Reason for Stopping:         loratadine (CLARITIN) 10 MG tablet Comments:   Reason for Stopping:         dimethicone-zinc oxide (DARIUS PROTECT) cream Comments:   Reason for Stopping:                 Note that more than 30 minutes was spent in preparing discharge papers, discussing discharge with patient, medication review, etc.    NOTE: This report was transcribed using voice recognition software. Every effort was made to ensure accuracy; however, inadvertent computerized transcription errors may be present. Signed:  Electronically signed by Marie Choi CNP on 9/10/2022 at [facility]   HOSPITALIST Alexandre Lopez NOTE 9/10/2022 2000PM:    Details of the evaluation - subjective assessment (including medication profile, past medical, family and social history when applicable), examination, review of lab and test data, diagnostic impressions and medical decision making - performed by Venecia Monge. Patrick Chen - UNRULY, were discussed with me on the date of service and I agree with clinical information herein unless otherwise noted. The patient has been evaluated by me personally earlier today. Pt reports no fevers, chills,n/v.     Exam: heart reg at rate of 80,lungs cta, abd pos bs soft nt, ext neg for le edema    I agree with the assessment and plan of Venecia Monge. ABBY Altman - UNRULY    Hydropneumothorax  Possible bacterial pneumonia with exudative pleural effusion  Acidosis  Htn  Hyperlipidemia    Total time for discharge is 37 min    Electronically signed by Yonny Garner D.O.   Hospitalist  [facility] Hospitalist Service at [facility]

## 2022-09-11 LAB
BODY FLUID CULTURE, STERILE: NORMAL
GRAM STAIN RESULT: NORMAL

## 2022-09-13 LAB
BLOOD CULTURE, ROUTINE: NORMAL
CULTURE, BLOOD 2: NORMAL

## 2022-10-25 LAB
AFB CULTURE (MYCOBACTERIA): NORMAL
AFB SMEAR: NORMAL

## 2023-04-03 ENCOUNTER — APPOINTMENT (OUTPATIENT)
Dept: GENERAL RADIOLOGY | Age: 88
DRG: 291 | End: 2023-04-03
Payer: MEDICARE

## 2023-04-03 ENCOUNTER — HOSPITAL ENCOUNTER (INPATIENT)
Age: 88
LOS: 3 days | Discharge: HOME | DRG: 291 | End: 2023-04-06
Attending: EMERGENCY MEDICINE | Admitting: INTERNAL MEDICINE
Payer: MEDICARE

## 2023-04-03 DIAGNOSIS — R06.09 DYSPNEA ON EXERTION: ICD-10-CM

## 2023-04-03 DIAGNOSIS — R07.9 CHEST PAIN, UNSPECIFIED TYPE: Primary | ICD-10-CM

## 2023-04-03 DIAGNOSIS — J81.0 ACUTE PULMONARY EDEMA (HCC): ICD-10-CM

## 2023-04-03 PROBLEM — I50.9 HEART FAILURE (HCC): Status: ACTIVE | Noted: 2023-04-03

## 2023-04-03 LAB
ALBUMIN SERPL-MCNC: 3.3 G/DL (ref 3.5–5.2)
ALP SERPL-CCNC: 192 U/L (ref 40–129)
ALT SERPL-CCNC: 24 U/L (ref 0–40)
ANION GAP SERPL CALCULATED.3IONS-SCNC: 12 MMOL/L (ref 7–16)
AST SERPL-CCNC: 44 U/L (ref 0–39)
BASOPHILS # BLD: 0.04 E9/L (ref 0–0.2)
BASOPHILS NFR BLD: 0.6 % (ref 0–2)
BILIRUB SERPL-MCNC: 0.9 MG/DL (ref 0–1.2)
BNP BLD-MCNC: ABNORMAL PG/ML (ref 0–450)
BUN SERPL-MCNC: 30 MG/DL (ref 6–23)
CALCIUM SERPL-MCNC: 9.2 MG/DL (ref 8.6–10.2)
CHLORIDE SERPL-SCNC: 100 MMOL/L (ref 98–107)
CO2 SERPL-SCNC: 21 MMOL/L (ref 22–29)
CREAT SERPL-MCNC: 1.4 MG/DL (ref 0.7–1.2)
EKG ATRIAL RATE: 92 BPM
EKG P AXIS: 72 DEGREES
EKG P-R INTERVAL: 196 MS
EKG Q-T INTERVAL: 366 MS
EKG QRS DURATION: 88 MS
EKG QTC CALCULATION (BAZETT): 452 MS
EKG R AXIS: 90 DEGREES
EKG T AXIS: -14 DEGREES
EKG VENTRICULAR RATE: 92 BPM
EOSINOPHIL # BLD: 0.21 E9/L (ref 0.05–0.5)
EOSINOPHIL NFR BLD: 3.1 % (ref 0–6)
ERYTHROCYTE [DISTWIDTH] IN BLOOD BY AUTOMATED COUNT: 16.6 FL (ref 11.5–15)
GLUCOSE SERPL-MCNC: 100 MG/DL (ref 74–99)
HCT VFR BLD AUTO: 38.8 % (ref 37–54)
HGB BLD-MCNC: 12 G/DL (ref 12.5–16.5)
IMM GRANULOCYTES # BLD: 0.03 E9/L
IMM GRANULOCYTES NFR BLD: 0.4 % (ref 0–5)
LYMPHOCYTES # BLD: 1.49 E9/L (ref 1.5–4)
LYMPHOCYTES NFR BLD: 21.8 % (ref 20–42)
MCH RBC QN AUTO: 29.9 PG (ref 26–35)
MCHC RBC AUTO-ENTMCNC: 30.9 % (ref 32–34.5)
MCV RBC AUTO: 96.5 FL (ref 80–99.9)
MONOCYTES # BLD: 0.8 E9/L (ref 0.1–0.95)
MONOCYTES NFR BLD: 11.7 % (ref 2–12)
NEUTROPHILS # BLD: 4.27 E9/L (ref 1.8–7.3)
NEUTS SEG NFR BLD: 62.4 % (ref 43–80)
PLATELET # BLD AUTO: 141 E9/L (ref 130–450)
PMV BLD AUTO: 10.6 FL (ref 7–12)
POTASSIUM SERPL-SCNC: 4.6 MMOL/L (ref 3.5–5)
PROT SERPL-MCNC: 7 G/DL (ref 6.4–8.3)
RBC # BLD AUTO: 4.02 E12/L (ref 3.8–5.8)
SODIUM SERPL-SCNC: 133 MMOL/L (ref 132–146)
TROPONIN, HIGH SENSITIVITY: 39 NG/L (ref 0–11)
TROPONIN, HIGH SENSITIVITY: 40 NG/L (ref 0–11)
TROPONIN, HIGH SENSITIVITY: 43 NG/L (ref 0–11)
WBC # BLD: 6.8 E9/L (ref 4.5–11.5)

## 2023-04-03 PROCEDURE — 99285 EMERGENCY DEPT VISIT HI MDM: CPT

## 2023-04-03 PROCEDURE — APPSS45 APP SPLIT SHARED TIME 31-45 MINUTES: Performed by: PHYSICIAN ASSISTANT

## 2023-04-03 PROCEDURE — 6370000000 HC RX 637 (ALT 250 FOR IP): Performed by: INTERNAL MEDICINE

## 2023-04-03 PROCEDURE — 93010 ELECTROCARDIOGRAM REPORT: CPT | Performed by: INTERNAL MEDICINE

## 2023-04-03 PROCEDURE — 1200000000 HC SEMI PRIVATE

## 2023-04-03 PROCEDURE — 36415 COLL VENOUS BLD VENIPUNCTURE: CPT

## 2023-04-03 PROCEDURE — 93005 ELECTROCARDIOGRAM TRACING: CPT | Performed by: EMERGENCY MEDICINE

## 2023-04-03 PROCEDURE — 71046 X-RAY EXAM CHEST 2 VIEWS: CPT

## 2023-04-03 PROCEDURE — 85025 COMPLETE CBC W/AUTO DIFF WBC: CPT

## 2023-04-03 PROCEDURE — 6370000000 HC RX 637 (ALT 250 FOR IP): Performed by: EMERGENCY MEDICINE

## 2023-04-03 PROCEDURE — 2580000003 HC RX 258: Performed by: INTERNAL MEDICINE

## 2023-04-03 PROCEDURE — 99223 1ST HOSP IP/OBS HIGH 75: CPT | Performed by: INTERNAL MEDICINE

## 2023-04-03 PROCEDURE — 80053 COMPREHEN METABOLIC PANEL: CPT

## 2023-04-03 PROCEDURE — 84484 ASSAY OF TROPONIN QUANT: CPT

## 2023-04-03 PROCEDURE — 6360000002 HC RX W HCPCS: Performed by: EMERGENCY MEDICINE

## 2023-04-03 PROCEDURE — 96374 THER/PROPH/DIAG INJ IV PUSH: CPT

## 2023-04-03 PROCEDURE — 83880 ASSAY OF NATRIURETIC PEPTIDE: CPT

## 2023-04-03 RX ORDER — FUROSEMIDE 10 MG/ML
40 INJECTION INTRAMUSCULAR; INTRAVENOUS ONCE
Status: COMPLETED | OUTPATIENT
Start: 2023-04-03 | End: 2023-04-03

## 2023-04-03 RX ORDER — METOPROLOL SUCCINATE 25 MG/1
25 TABLET, EXTENDED RELEASE ORAL EVERY MORNING
Status: DISCONTINUED | OUTPATIENT
Start: 2023-04-04 | End: 2023-04-06 | Stop reason: HOSPADM

## 2023-04-03 RX ORDER — ACETAMINOPHEN 325 MG/1
650 TABLET ORAL EVERY 6 HOURS PRN
Status: DISCONTINUED | OUTPATIENT
Start: 2023-04-03 | End: 2023-04-06 | Stop reason: HOSPADM

## 2023-04-03 RX ORDER — FAMOTIDINE 20 MG/1
20 TABLET, FILM COATED ORAL EVERY MORNING
Status: DISCONTINUED | OUTPATIENT
Start: 2023-04-04 | End: 2023-04-06 | Stop reason: HOSPADM

## 2023-04-03 RX ORDER — BRIMONIDINE TARTRATE 2 MG/ML
1 SOLUTION/ DROPS OPHTHALMIC 2 TIMES DAILY
Status: DISCONTINUED | OUTPATIENT
Start: 2023-04-03 | End: 2023-04-06 | Stop reason: HOSPADM

## 2023-04-03 RX ORDER — SODIUM CHLORIDE 0.9 % (FLUSH) 0.9 %
5-40 SYRINGE (ML) INJECTION PRN
Status: DISCONTINUED | OUTPATIENT
Start: 2023-04-03 | End: 2023-04-06 | Stop reason: HOSPADM

## 2023-04-03 RX ORDER — ATORVASTATIN CALCIUM 20 MG/1
20 TABLET, FILM COATED ORAL NIGHTLY
Status: DISCONTINUED | OUTPATIENT
Start: 2023-04-03 | End: 2023-04-06 | Stop reason: HOSPADM

## 2023-04-03 RX ORDER — BRIMONIDINE TARTRATE AND TIMOLOL MALEATE 2; 5 MG/ML; MG/ML
1 SOLUTION OPHTHALMIC 2 TIMES DAILY
Status: DISCONTINUED | OUTPATIENT
Start: 2023-04-03 | End: 2023-04-03 | Stop reason: CLARIF

## 2023-04-03 RX ORDER — ENOXAPARIN SODIUM 100 MG/ML
40 INJECTION SUBCUTANEOUS DAILY
Status: DISCONTINUED | OUTPATIENT
Start: 2023-04-03 | End: 2023-04-06 | Stop reason: HOSPADM

## 2023-04-03 RX ORDER — SODIUM CHLORIDE 9 MG/ML
INJECTION, SOLUTION INTRAVENOUS PRN
Status: DISCONTINUED | OUTPATIENT
Start: 2023-04-03 | End: 2023-04-06 | Stop reason: HOSPADM

## 2023-04-03 RX ORDER — ONDANSETRON 4 MG/1
4 TABLET, ORALLY DISINTEGRATING ORAL EVERY 8 HOURS PRN
Status: DISCONTINUED | OUTPATIENT
Start: 2023-04-03 | End: 2023-04-06 | Stop reason: HOSPADM

## 2023-04-03 RX ORDER — ONDANSETRON 2 MG/ML
4 INJECTION INTRAMUSCULAR; INTRAVENOUS EVERY 6 HOURS PRN
Status: DISCONTINUED | OUTPATIENT
Start: 2023-04-03 | End: 2023-04-06 | Stop reason: HOSPADM

## 2023-04-03 RX ORDER — FUROSEMIDE 10 MG/ML
40 INJECTION INTRAMUSCULAR; INTRAVENOUS 2 TIMES DAILY
Status: DISCONTINUED | OUTPATIENT
Start: 2023-04-03 | End: 2023-04-05

## 2023-04-03 RX ORDER — TIMOLOL MALEATE 5 MG/ML
1 SOLUTION/ DROPS OPHTHALMIC 2 TIMES DAILY
Status: DISCONTINUED | OUTPATIENT
Start: 2023-04-03 | End: 2023-04-06 | Stop reason: HOSPADM

## 2023-04-03 RX ORDER — METOPROLOL SUCCINATE 25 MG/1
12.5 TABLET, EXTENDED RELEASE ORAL EVERY MORNING
Status: DISCONTINUED | OUTPATIENT
Start: 2023-04-04 | End: 2023-04-03

## 2023-04-03 RX ORDER — ASPIRIN 325 MG
325 TABLET ORAL ONCE
Status: COMPLETED | OUTPATIENT
Start: 2023-04-03 | End: 2023-04-03

## 2023-04-03 RX ORDER — ACETAMINOPHEN 650 MG/1
650 SUPPOSITORY RECTAL EVERY 6 HOURS PRN
Status: DISCONTINUED | OUTPATIENT
Start: 2023-04-03 | End: 2023-04-06 | Stop reason: HOSPADM

## 2023-04-03 RX ORDER — SODIUM CHLORIDE 0.9 % (FLUSH) 0.9 %
5-40 SYRINGE (ML) INJECTION EVERY 12 HOURS SCHEDULED
Status: DISCONTINUED | OUTPATIENT
Start: 2023-04-03 | End: 2023-04-06 | Stop reason: HOSPADM

## 2023-04-03 RX ORDER — ASPIRIN 81 MG/1
81 TABLET, CHEWABLE ORAL EVERY MORNING
Status: DISCONTINUED | OUTPATIENT
Start: 2023-04-04 | End: 2023-04-06 | Stop reason: HOSPADM

## 2023-04-03 RX ORDER — POLYETHYLENE GLYCOL 3350 17 G/17G
17 POWDER, FOR SOLUTION ORAL DAILY PRN
Status: DISCONTINUED | OUTPATIENT
Start: 2023-04-03 | End: 2023-04-06 | Stop reason: HOSPADM

## 2023-04-03 RX ORDER — LACTOBACILLUS RHAMNOSUS GG 10B CELL
1 CAPSULE ORAL EVERY MORNING
Status: DISCONTINUED | OUTPATIENT
Start: 2023-04-04 | End: 2023-04-06 | Stop reason: HOSPADM

## 2023-04-03 RX ADMIN — FUROSEMIDE 40 MG: 10 INJECTION, SOLUTION INTRAMUSCULAR; INTRAVENOUS at 17:25

## 2023-04-03 RX ADMIN — ASPIRIN 325 MG: 325 TABLET ORAL at 12:00

## 2023-04-03 RX ADMIN — BRIMONIDINE TARTRATE 1 DROP: 2 SOLUTION/ DROPS OPHTHALMIC at 22:34

## 2023-04-03 RX ADMIN — TIMOLOL MALEATE 1 DROP: 5 SOLUTION/ DROPS OPHTHALMIC at 22:34

## 2023-04-03 RX ADMIN — NITROGLYCERIN 0.5 INCH: 20 OINTMENT TOPICAL at 17:23

## 2023-04-03 RX ADMIN — ATORVASTATIN CALCIUM 20 MG: 20 TABLET, FILM COATED ORAL at 22:33

## 2023-04-03 RX ADMIN — Medication 10 ML: at 22:33

## 2023-04-03 ASSESSMENT — PAIN - FUNCTIONAL ASSESSMENT: PAIN_FUNCTIONAL_ASSESSMENT: NONE - DENIES PAIN

## 2023-04-03 NOTE — ED PROVIDER NOTES
polyethylene glycol (GLYCOLAX) packet 17 g (has no administration in time range)   acetaminophen (TYLENOL) tablet 650 mg (has no administration in time range)     Or   acetaminophen (TYLENOL) suppository 650 mg (has no administration in time range)   furosemide (LASIX) injection 40 mg (has no administration in time range)   perflutren lipid microspheres (DEFINITY) injection 1.5 mL (has no administration in time range)   aspirin tablet 325 mg (325 mg Oral Given 4/3/23 1200)         Is this patient to be included in the SEP-1 Core Measure due to severe sepsis or septic shock?   No Exclusion criteria - the patient is NOT to be included for SEP-1 Core Measure due to: Infection is not suspected          Medical Decision Making/Differential Diagnosis:    CC/HPI Summary, Social Determinants of health, Records Reviewed, DDx, testing done/not done, ED Course, Reassessment, disposition considerations/shared decision making with patient, consults, disposition:        ED Course as of 04/03/23 1537   Mon Apr 03, 2023   0934 On chart review was admitted 9822 for chest pain and pneumothorax on the right [JG]   0936 EKG Interpretation  Interpreted by emergency department physician, Dr. Jenkins     4/3/23  Time: 0929    Rhythm: normal sinus   Rate: normal  Axis: normal  Conduction: normal  ST Segments: no acute change  T Waves: no acute change    Clinical Impression: Sinus rhythm, no acute ischemic changes    Comparison to Old EKG  slight changes from prior with PVC     [CD]   1333 Put a consult to [facility] physicians for admission. [JG]   1425 The following tests were interpreted by me:       [CD]   1425 Troponin(!):    Troponin, High Sensitivity 40(!) [CD]   1425 Brain Natriuretic Peptide(!):    Pro-BNP 32,731(!) [CD]   1425 Comprehensive Metabolic Panel w/ Reflex to MG(!):    Sodium 133   Potassium 4.6   Chloride 100   CO2 21(!)   Anion Gap 12   Glucose, Random 100(!)   BUN,BUNPL 30(!)   Creatinine 1.4(!)   Est, Glom Filt Rate 47 
DISPOSITION/PLAN     DISPOSITION        PATIENT REFERRED TO:  No follow-up provider specified.     DISCHARGE MEDICATIONS:  New Prescriptions    No medications on file       DISCONTINUED MEDICATIONS:  Discontinued Medications    No medications on file              (Please note that portions of this note were completed with a voice recognition program.  Efforts were made to edit the dictations but occasionally words are mis-transcribed.)    Gage Anaya MD (electronically signed)           Vane Whitten MD  04/03/23 9504

## 2023-04-03 NOTE — CONSULTS
hydropneumothorax s/p thoracentesis (400 cc exudate)    Past Surgical History:    Past Surgical History:   Procedure Laterality Date    APPENDECTOMY      BACK SURGERY      CABG WITH AORTIC VALVE REPAIR         Medications Prior to admit:  Prior to Admission medications    Medication Sig Start Date End Date Taking? Authorizing Provider   Menthol, Topical Analgesic, (BIOFREEZE) 4 % GEL Apply topically every 6 hours as needed (RIGHT KNEE PAIN)    Historical Provider, MD   aspirin 81 MG chewable tablet Take 81 mg by mouth every morning    Historical Provider, MD   metoprolol succinate (TOPROL XL) 25 MG extended release tablet Take 12.5 mg by mouth every morning Indications: -HOLD IF SBP<100 OR P<60-    Historical Provider, MD   nitroGLYCERIN (NITROSTAT) 0.4 MG SL tablet Place 0.4 mg under the tongue every 5 minutes as needed for Chest pain up to max of 3 total doses.  If no relief after 1 dose, call 911.  9/10/22  Historical Provider, MD   Lactobacillus (ACIDOPHILUS) TABS Take 1 tablet by mouth every morning    Historical Provider, MD   brimonidine-timolol (COMBIGAN) 0.2-0.5 % ophthalmic solution Place 1 drop into both eyes 2 times daily    Historical Provider, MD   Cholecalciferol (VITAMIN D3) 50 MCG (2000 UT) TABS Take 2,000 Units by mouth every morning    Historical Provider, MD   famotidine (PEPCID) 20 MG tablet Take 20 mg by mouth every morning    Historical Provider, MD   vitamin B-12 (CYANOCOBALAMIN) 1000 MCG tablet Take 1,000 mcg by mouth every morning    Historical Provider, MD   vitamin B-6 (PYRIDOXINE) 100 MG tablet Take 100 mg by mouth every morning    Historical Provider, MD   Folic Acid 0.8 MG CAPS Take 0.8 mg by mouth every morning    Historical Provider, MD   atorvastatin (LIPITOR) 20 MG tablet Take 20 mg by mouth nightly    Historical Provider, MD   Travoprost, BAK Free, (TRAVATAN Z) 0.004 % SOLN ophthalmic solution Place 1 drop into both eyes nightly    Historical Provider, MD       Current Medications:

## 2023-04-03 NOTE — H&P
into both eyes nightly    Historical Provider, MD         Allergies:  Patient has no known allergies. Social History:    TOBACCO:   reports that he has never smoked. He has never used smokeless tobacco.  ETOH:   reports current alcohol use. Family History:    Reviewed in detail and negative for DM, CAD, Cancer, CVA. Positive as follows\"  No family history on file. REVIEW OF SYSTEMS:   Pertinent positives as noted in the HPI. All other systems reviewed and negative. PHYSICAL EXAM:  BP (!) 147/75   Pulse 83   Temp 97.8 °F (36.6 °C)   Resp 20   SpO2 94%   General appearance: No apparent distress, appears stated age and cooperative. HEENT: Normal cephalic, atraumatic without obvious deformity. Pupils equal, round, and reactive to light. Extra ocular muscles intact. Conjunctivae/corneas clear. Neck: Supple, with full range of motion. No jugular venous distention. Trachea midline. Respiratory: Bilateral basal crackles noted  Cardiovascular: S1, S2, no murmur  Abdomen: Soft, nontender, nondistended  Musculoskeletal: No clubbing, cyanosis, edema of bilateral lower extremities. Brisk capillary refill. Skin: Normal skin color. No rashes or lesions. Neurologic:  Neurovascularly intact without any focal sensory/motor deficits. Cranial nerves: II-XII intact, grossly non-focal.  Psychiatric: Alert and oriented, thought content appropriate, normal insight    Reviewed EKG and CXR personally      CBC:   Recent Labs     04/03/23  1206   WBC 6.8   RBC 4.02   HGB 12.0*   HCT 38.8   MCV 96.5   RDW 16.6*        BMP:   Recent Labs     04/03/23  1206      K 4.6      CO2 21*   BUN 30*   CREATININE 1.4*     LFT:  Recent Labs     04/03/23  1206   PROT 7.0   ALKPHOS 192*   ALT 24   AST 44*   BILITOT 0.9     CE:  No results for input(s): Otilio Sandra in the last 72 hours. PT/INR: No results for input(s): INR, APTT in the last 72 hours.   BNP: No results for input(s): BNP in the last 72

## 2023-04-04 ENCOUNTER — APPOINTMENT (OUTPATIENT)
Dept: GENERAL RADIOLOGY | Age: 88
DRG: 291 | End: 2023-04-04
Payer: MEDICARE

## 2023-04-04 ENCOUNTER — APPOINTMENT (OUTPATIENT)
Dept: CT IMAGING | Age: 88
DRG: 291 | End: 2023-04-04
Payer: MEDICARE

## 2023-04-04 LAB
ALBUMIN SERPL-MCNC: 2.7 G/DL (ref 3.5–5.2)
ALP SERPL-CCNC: 163 U/L (ref 40–129)
ALT SERPL-CCNC: 20 U/L (ref 0–40)
ANION GAP SERPL CALCULATED.3IONS-SCNC: 14 MMOL/L (ref 7–16)
AST SERPL-CCNC: 40 U/L (ref 0–39)
BILIRUB SERPL-MCNC: 0.9 MG/DL (ref 0–1.2)
BUN SERPL-MCNC: 27 MG/DL (ref 6–23)
CALCIUM SERPL-MCNC: 8.8 MG/DL (ref 8.6–10.2)
CHLORIDE SERPL-SCNC: 99 MMOL/L (ref 98–107)
CO2 SERPL-SCNC: 20 MMOL/L (ref 22–29)
CREAT SERPL-MCNC: 1.3 MG/DL (ref 0.7–1.2)
ERYTHROCYTE [DISTWIDTH] IN BLOOD BY AUTOMATED COUNT: 16.4 FL (ref 11.5–15)
GLUCOSE SERPL-MCNC: 81 MG/DL (ref 74–99)
HCT VFR BLD AUTO: 34.6 % (ref 37–54)
HGB BLD-MCNC: 11.1 G/DL (ref 12.5–16.5)
LV EF: 48 %
LVEF MODALITY: NORMAL
MCH RBC QN AUTO: 30.5 PG (ref 26–35)
MCHC RBC AUTO-ENTMCNC: 32.1 % (ref 32–34.5)
MCV RBC AUTO: 95.1 FL (ref 80–99.9)
PLATELET # BLD AUTO: 109 E9/L (ref 130–450)
PMV BLD AUTO: 10.9 FL (ref 7–12)
POTASSIUM SERPL-SCNC: 4.5 MMOL/L (ref 3.5–5)
PROT SERPL-MCNC: 5.7 G/DL (ref 6.4–8.3)
RBC # BLD AUTO: 3.64 E12/L (ref 3.8–5.8)
SODIUM SERPL-SCNC: 133 MMOL/L (ref 132–146)
WBC # BLD: 6.2 E9/L (ref 4.5–11.5)

## 2023-04-04 PROCEDURE — 80053 COMPREHEN METABOLIC PANEL: CPT

## 2023-04-04 PROCEDURE — 2580000003 HC RX 258: Performed by: INTERNAL MEDICINE

## 2023-04-04 PROCEDURE — 6370000000 HC RX 637 (ALT 250 FOR IP): Performed by: INTERNAL MEDICINE

## 2023-04-04 PROCEDURE — 73020 X-RAY EXAM OF SHOULDER: CPT

## 2023-04-04 PROCEDURE — 93306 TTE W/DOPPLER COMPLETE: CPT

## 2023-04-04 PROCEDURE — 97530 THERAPEUTIC ACTIVITIES: CPT

## 2023-04-04 PROCEDURE — 99232 SBSQ HOSP IP/OBS MODERATE 35: CPT | Performed by: INTERNAL MEDICINE

## 2023-04-04 PROCEDURE — 1200000000 HC SEMI PRIVATE

## 2023-04-04 PROCEDURE — 6360000002 HC RX W HCPCS: Performed by: INTERNAL MEDICINE

## 2023-04-04 PROCEDURE — 36415 COLL VENOUS BLD VENIPUNCTURE: CPT

## 2023-04-04 PROCEDURE — 70450 CT HEAD/BRAIN W/O DYE: CPT

## 2023-04-04 PROCEDURE — 97161 PT EVAL LOW COMPLEX 20 MIN: CPT

## 2023-04-04 PROCEDURE — 85027 COMPLETE CBC AUTOMATED: CPT

## 2023-04-04 RX ORDER — HYDRALAZINE HYDROCHLORIDE 20 MG/ML
10 INJECTION INTRAMUSCULAR; INTRAVENOUS EVERY 6 HOURS PRN
Status: DISCONTINUED | OUTPATIENT
Start: 2023-04-04 | End: 2023-04-06 | Stop reason: HOSPADM

## 2023-04-04 RX ADMIN — FUROSEMIDE 40 MG: 10 INJECTION, SOLUTION INTRAMUSCULAR; INTRAVENOUS at 07:28

## 2023-04-04 RX ADMIN — TIMOLOL MALEATE 1 DROP: 5 SOLUTION/ DROPS OPHTHALMIC at 07:29

## 2023-04-04 RX ADMIN — Medication 5 ML: at 08:14

## 2023-04-04 RX ADMIN — NITROGLYCERIN 0.5 INCH: 20 OINTMENT TOPICAL at 00:14

## 2023-04-04 RX ADMIN — TIMOLOL MALEATE 1 DROP: 5 SOLUTION/ DROPS OPHTHALMIC at 20:25

## 2023-04-04 RX ADMIN — ATORVASTATIN CALCIUM 20 MG: 20 TABLET, FILM COATED ORAL at 20:21

## 2023-04-04 RX ADMIN — BRIMONIDINE TARTRATE 1 DROP: 2 SOLUTION/ DROPS OPHTHALMIC at 20:24

## 2023-04-04 RX ADMIN — ENOXAPARIN SODIUM 40 MG: 100 INJECTION SUBCUTANEOUS at 07:28

## 2023-04-04 RX ADMIN — NITROGLYCERIN 0.5 INCH: 20 OINTMENT TOPICAL at 05:45

## 2023-04-04 RX ADMIN — ASPIRIN 81 MG 81 MG: 81 TABLET ORAL at 07:28

## 2023-04-04 RX ADMIN — ACETAMINOPHEN 650 MG: 325 TABLET ORAL at 22:42

## 2023-04-04 RX ADMIN — BRIMONIDINE TARTRATE 1 DROP: 2 SOLUTION/ DROPS OPHTHALMIC at 07:29

## 2023-04-04 RX ADMIN — Medication 10 ML: at 20:21

## 2023-04-04 RX ADMIN — FAMOTIDINE 20 MG: 20 TABLET, FILM COATED ORAL at 07:29

## 2023-04-04 RX ADMIN — FUROSEMIDE 40 MG: 10 INJECTION, SOLUTION INTRAMUSCULAR; INTRAVENOUS at 17:13

## 2023-04-04 RX ADMIN — Medication 1 CAPSULE: at 07:28

## 2023-04-04 RX ADMIN — METOPROLOL SUCCINATE 25 MG: 25 TABLET, EXTENDED RELEASE ORAL at 07:28

## 2023-04-04 RX ADMIN — NITROGLYCERIN 1 INCH: 20 OINTMENT TOPICAL at 20:17

## 2023-04-04 RX ADMIN — NITROGLYCERIN 0.5 INCH: 20 OINTMENT TOPICAL at 14:24

## 2023-04-04 ASSESSMENT — PAIN DESCRIPTION - LOCATION: LOCATION: NECK

## 2023-04-04 ASSESSMENT — PAIN DESCRIPTION - DESCRIPTORS: DESCRIPTORS: SORE

## 2023-04-04 ASSESSMENT — PAIN SCALES - GENERAL: PAINLEVEL_OUTOF10: 3

## 2023-04-04 NOTE — DISCHARGE INSTRUCTIONS
HEART FAILURE  / CONGESTIVE HEART FAILURE  DISCHARGE INSTRUCTIONS:  GUIDELINES TO FOLLOW AT HOME    Self- Managed Care:     MEDICATIONS:  Take your medication as directed. If you are experiencing any side effects, inform your doctor, Do not stop taking any of your medications without letting your doctor know. Check with your doctor before taking any over-the-counter medications / herbal / or dietary supplements. They may interfere with your other medications. Do not take ibuprofen (Advil or Motrin) and naproxen (Aleve) without talking to your doctor first. They could make your heart failure worse. WEIGHT MONITORING:   Weigh yourself everyday (with the same scale) around the same time of the day and write it down. (you can chart them on a calendar or keep track of them on paper. Notify your doctor of a weight gain of 3 pounds or more in 1 day   OR a total of 5 pounds or more in 1 week    Take your weight record to your doctor visits  Also, the same goes if you loose more than 3# in one day, let your heart doctor know. DIET:   Cardiac heart healthy diet- Low saturated / low trans fat, no added salt, caffeine restricted, Low sodium diet-   No more than 2,000mg (2 grams) of salt / sodium per day (which equals to a little less than  a teaspoon of salt)  If your doctor wants you on a fluid restriction. ..it is usually recommended a fluid limit of 2,000cc -  Fluid restriction- 2,000 ml (milliliters) = 64 ounces = you can have 8 glasses of fluid per day (each glass 8 ounces)    Follow a low salt diet - avoid using salt at the table, avoid / limit use of canned soups, processed / packaged foods, salted snacks, olives and pickles. Do not use a salt substitute without checking with your doctor, they may contain a high amount of potassioum. (Mrs. Aiden Mix is safe to use).     Limit the use of alcohol       CALL YOUR DOCTOR THE FIRST DAY YOU NOTICE ANY OF THESE   SYMPTOMS:  You have a weight

## 2023-04-04 NOTE — PLAN OF CARE
Patient's chart updated to reflect:      . - HF care plan, HF education points and HF discharge instructions.  -Orders: 2 gram sodium diet, daily weights, I/O.  -PCP and cardiology follow up appointments to be scheduled within 7 days of hospital discharge. -CHF education session will be provided to the patient prior to hospital discharge.     Juliana Griffith RN RN, BSN  Heart Failure Navigator

## 2023-04-04 NOTE — ACP (ADVANCE CARE PLANNING)
Advance Care Planning   The patient has the following advanced directives on file:  Advance Directives       Power of [address] Will ACP-Advance Directive ACP-Power of     Not on File Not on File Not on File Not on File            The patient has appointed the following active healthcare agents:    Primary Decision Maker: Lizzy Jordan [facility] - [phone]    The Patient has the following current code status:    Code Status: DNR-CCA      KIMI Culver  4/4/2023

## 2023-04-05 ENCOUNTER — APPOINTMENT (OUTPATIENT)
Dept: CT IMAGING | Age: 88
DRG: 291 | End: 2023-04-05
Payer: MEDICARE

## 2023-04-05 LAB
ANION GAP SERPL CALCULATED.3IONS-SCNC: 14 MMOL/L (ref 7–16)
BUN SERPL-MCNC: 32 MG/DL (ref 6–23)
CALCIUM SERPL-MCNC: 9.1 MG/DL (ref 8.6–10.2)
CHLORIDE SERPL-SCNC: 96 MMOL/L (ref 98–107)
CO2 SERPL-SCNC: 24 MMOL/L (ref 22–29)
CREAT SERPL-MCNC: 1.7 MG/DL (ref 0.7–1.2)
ERYTHROCYTE [DISTWIDTH] IN BLOOD BY AUTOMATED COUNT: 16.3 FL (ref 11.5–15)
GLUCOSE SERPL-MCNC: 84 MG/DL (ref 74–99)
HCT VFR BLD AUTO: 35.4 % (ref 37–54)
HGB BLD-MCNC: 11.2 G/DL (ref 12.5–16.5)
MCH RBC QN AUTO: 30.1 PG (ref 26–35)
MCHC RBC AUTO-ENTMCNC: 31.6 % (ref 32–34.5)
MCV RBC AUTO: 95.2 FL (ref 80–99.9)
PLATELET # BLD AUTO: 163 E9/L (ref 130–450)
PMV BLD AUTO: 10.4 FL (ref 7–12)
POTASSIUM SERPL-SCNC: 4.1 MMOL/L (ref 3.5–5)
RBC # BLD AUTO: 3.72 E12/L (ref 3.8–5.8)
SODIUM SERPL-SCNC: 134 MMOL/L (ref 132–146)
WBC # BLD: 7.7 E9/L (ref 4.5–11.5)

## 2023-04-05 PROCEDURE — 70450 CT HEAD/BRAIN W/O DYE: CPT

## 2023-04-05 PROCEDURE — 1200000000 HC SEMI PRIVATE

## 2023-04-05 PROCEDURE — 6360000002 HC RX W HCPCS: Performed by: INTERNAL MEDICINE

## 2023-04-05 PROCEDURE — 6370000000 HC RX 637 (ALT 250 FOR IP): Performed by: INTERNAL MEDICINE

## 2023-04-05 PROCEDURE — 2700000000 HC OXYGEN THERAPY PER DAY

## 2023-04-05 PROCEDURE — 99222 1ST HOSP IP/OBS MODERATE 55: CPT | Performed by: NEUROLOGICAL SURGERY

## 2023-04-05 PROCEDURE — 36415 COLL VENOUS BLD VENIPUNCTURE: CPT

## 2023-04-05 PROCEDURE — 97535 SELF CARE MNGMENT TRAINING: CPT

## 2023-04-05 PROCEDURE — 80048 BASIC METABOLIC PNL TOTAL CA: CPT

## 2023-04-05 PROCEDURE — 6370000000 HC RX 637 (ALT 250 FOR IP): Performed by: PHYSICIAN ASSISTANT

## 2023-04-05 PROCEDURE — 97165 OT EVAL LOW COMPLEX 30 MIN: CPT

## 2023-04-05 PROCEDURE — 97530 THERAPEUTIC ACTIVITIES: CPT

## 2023-04-05 PROCEDURE — 85027 COMPLETE CBC AUTOMATED: CPT

## 2023-04-05 PROCEDURE — 99232 SBSQ HOSP IP/OBS MODERATE 35: CPT | Performed by: INTERNAL MEDICINE

## 2023-04-05 PROCEDURE — 2580000003 HC RX 258: Performed by: INTERNAL MEDICINE

## 2023-04-05 RX ORDER — LEVETIRACETAM 500 MG/1
500 TABLET ORAL 2 TIMES DAILY
Qty: 14 TABLET | Refills: 0 | Status: SHIPPED | OUTPATIENT
Start: 2023-04-05 | End: 2023-04-12

## 2023-04-05 RX ORDER — FUROSEMIDE 20 MG/1
20 TABLET ORAL DAILY
Qty: 60 TABLET | Refills: 3 | Status: SHIPPED | OUTPATIENT
Start: 2023-04-05

## 2023-04-05 RX ORDER — LEVETIRACETAM 500 MG/1
500 TABLET ORAL 2 TIMES DAILY
Status: DISCONTINUED | OUTPATIENT
Start: 2023-04-05 | End: 2023-04-06 | Stop reason: HOSPADM

## 2023-04-05 RX ADMIN — FAMOTIDINE 20 MG: 20 TABLET, FILM COATED ORAL at 10:49

## 2023-04-05 RX ADMIN — LEVETIRACETAM 500 MG: 500 TABLET, FILM COATED ORAL at 13:43

## 2023-04-05 RX ADMIN — NITROGLYCERIN 1 INCH: 20 OINTMENT TOPICAL at 02:30

## 2023-04-05 RX ADMIN — TIMOLOL MALEATE 1 DROP: 5 SOLUTION/ DROPS OPHTHALMIC at 10:47

## 2023-04-05 RX ADMIN — METOPROLOL SUCCINATE 25 MG: 25 TABLET, EXTENDED RELEASE ORAL at 10:48

## 2023-04-05 RX ADMIN — FUROSEMIDE 40 MG: 10 INJECTION, SOLUTION INTRAMUSCULAR; INTRAVENOUS at 10:50

## 2023-04-05 RX ADMIN — BRIMONIDINE TARTRATE 1 DROP: 2 SOLUTION/ DROPS OPHTHALMIC at 22:28

## 2023-04-05 RX ADMIN — ATORVASTATIN CALCIUM 20 MG: 20 TABLET, FILM COATED ORAL at 22:20

## 2023-04-05 RX ADMIN — Medication 10 ML: at 22:20

## 2023-04-05 RX ADMIN — Medication 10 ML: at 10:50

## 2023-04-05 RX ADMIN — NITROGLYCERIN 0.5 INCH: 20 OINTMENT TOPICAL at 16:17

## 2023-04-05 RX ADMIN — NITROGLYCERIN 1 INCH: 20 OINTMENT TOPICAL at 10:47

## 2023-04-05 RX ADMIN — LEVETIRACETAM 500 MG: 500 TABLET, FILM COATED ORAL at 22:20

## 2023-04-05 RX ADMIN — Medication 1 CAPSULE: at 10:49

## 2023-04-05 RX ADMIN — TIMOLOL MALEATE 1 DROP: 5 SOLUTION/ DROPS OPHTHALMIC at 22:28

## 2023-04-05 RX ADMIN — ACETAMINOPHEN 650 MG: 325 TABLET ORAL at 22:20

## 2023-04-05 RX ADMIN — BRIMONIDINE TARTRATE 1 DROP: 2 SOLUTION/ DROPS OPHTHALMIC at 10:47

## 2023-04-05 RX ADMIN — NITROGLYCERIN 0.5 INCH: 20 OINTMENT TOPICAL at 22:19

## 2023-04-05 ASSESSMENT — ENCOUNTER SYMPTOMS
RESPIRATORY NEGATIVE: 1
EYES NEGATIVE: 1
GASTROINTESTINAL NEGATIVE: 1
ALLERGIC/IMMUNOLOGIC NEGATIVE: 1

## 2023-04-05 ASSESSMENT — PAIN SCALES - GENERAL: PAINLEVEL_OUTOF10: 0

## 2023-04-05 NOTE — PLAN OF CARE
Problem: Safety - Adult  Goal: Free from fall injury  Outcome: Progressing     Problem: ABCDS Injury Assessment  Goal: Absence of physical injury  Outcome: Progressing     Problem: Skin/Tissue Integrity  Goal: Absence of new skin breakdown  Description: 1. Monitor for areas of redness and/or skin breakdown  2. Assess vascular access sites hourly  3. Every 4-6 hours minimum:  Change oxygen saturation probe site  4. Every 4-6 hours:  If on nasal continuous positive airway pressure, respiratory therapy assess nares and determine need for appliance change or resting period.   Outcome: Progressing     Problem: Cardiovascular - Adult  Goal: Maintains optimal cardiac output and hemodynamic stability  Outcome: Progressing     Problem: Metabolic/Fluid and Electrolytes - Adult  Goal: Hemodynamic stability and optimal renal function maintained  Outcome: Progressing

## 2023-04-05 NOTE — CONSULTS
hydrALAZINE (APRESOLINE) injection 10 mg  10 mg IntraVENous Q6H PRN Tino Quinteros MD        [Held by provider] aspirin chewable tablet 81 mg  81 mg Oral ULISES Schmitt MD   81 mg at 04/04/23 0728    atorvastatin (LIPITOR) tablet 20 mg  20 mg Oral Nightly Phil Schmitt MD   20 mg at 04/04/23 2021    famotidine (PEPCID) tablet 20 mg  20 mg Oral ULISES Schmitt MD   20 mg at 04/05/23 1049    lactobacillus (CULTURELLE) capsule 1 capsule  1 capsule Oral ULISES Schmitt MD   1 capsule at 04/05/23 1049    sodium chloride flush 0.9 % injection 5-40 mL  5-40 mL IntraVENous 2 times per day Phil Schmitt MD   10 mL at 04/05/23 1050    sodium chloride flush 0.9 % injection 5-40 mL  5-40 mL IntraVENous PRN Phil Schimtt MD        0.9 % sodium chloride infusion   IntraVENous PRN Phil Schmitt MD        [Held by provider] enoxaparin (LOVENOX) injection 40 mg  40 mg SubCUTAneous Daily Phil Schmitt MD   40 mg at 04/04/23 0728    ondansetron (ZOFRAN-ODT) disintegrating tablet 4 mg  4 mg Oral Q8H PRN Phil Schmitt MD        Or    ondansetron (ZOFRAN) injection 4 mg  4 mg IntraVENous Q6H PRN Phil Schmitt MD        polyethylene glycol (GLYCOLAX) packet 17 g  17 g Oral Daily PRN Phil Schmitt MD        acetaminophen (TYLENOL) tablet 650 mg  650 mg Oral Q6H PRN Phil Schmitt MD   650 mg at 04/04/23 2242    Or    acetaminophen (TYLENOL) suppository 650 mg  650 mg Rectal Q6H PRN Phil Schmitt MD        furosemide (LASIX) injection 40 mg  40 mg IntraVENous BID Phil Schmitt MD   40 mg at 04/05/23 1050    perflutren lipid microspheres (DEFINITY) injection 1.5 mL  1.5 mL IntraVENous ONCE PRN Phil Schmitt MD        metoprolol succinate (TOPROL XL) extended release tablet 25 mg  25 mg Oral ULISES Munoz MD   25 mg at 04/05/23 1048    brimonidine (ALPHAGAN) 0.2 % ophthalmic solution 1 drop  1 drop Both Eyes BID Wild Rose MD Oskar   1 drop at 04/05/23 1047    timolol (TIMOPTIC) 0.5 % ophthalmic solution 1 drop  1 drop Both Eyes BID Phil Schmitt MD   1 drop at

## 2023-04-06 VITALS
TEMPERATURE: 98 F | HEIGHT: 71 IN | OXYGEN SATURATION: 94 % | DIASTOLIC BLOOD PRESSURE: 50 MMHG | HEART RATE: 61 BPM | BODY MASS INDEX: 20.71 KG/M2 | SYSTOLIC BLOOD PRESSURE: 126 MMHG | WEIGHT: 147.9 LBS | RESPIRATION RATE: 16 BRPM

## 2023-04-06 LAB
ANION GAP SERPL CALCULATED.3IONS-SCNC: 12 MMOL/L (ref 7–16)
BUN SERPL-MCNC: 35 MG/DL (ref 6–23)
CALCIUM SERPL-MCNC: 8.7 MG/DL (ref 8.6–10.2)
CHLORIDE SERPL-SCNC: 98 MMOL/L (ref 98–107)
CO2 SERPL-SCNC: 24 MMOL/L (ref 22–29)
CREAT SERPL-MCNC: 1.6 MG/DL (ref 0.7–1.2)
ERYTHROCYTE [DISTWIDTH] IN BLOOD BY AUTOMATED COUNT: 16.1 FL (ref 11.5–15)
GLUCOSE SERPL-MCNC: 102 MG/DL (ref 74–99)
HCT VFR BLD AUTO: 36.6 % (ref 37–54)
HGB BLD-MCNC: 11.4 G/DL (ref 12.5–16.5)
MCH RBC QN AUTO: 30.1 PG (ref 26–35)
MCHC RBC AUTO-ENTMCNC: 31.1 % (ref 32–34.5)
MCV RBC AUTO: 96.6 FL (ref 80–99.9)
PLATELET # BLD AUTO: 155 E9/L (ref 130–450)
PMV BLD AUTO: 10.2 FL (ref 7–12)
POTASSIUM SERPL-SCNC: 4 MMOL/L (ref 3.5–5)
RBC # BLD AUTO: 3.79 E12/L (ref 3.8–5.8)
SARS-COV-2 RDRP RESP QL NAA+PROBE: NOT DETECTED
SODIUM SERPL-SCNC: 134 MMOL/L (ref 132–146)
WBC # BLD: 6.9 E9/L (ref 4.5–11.5)

## 2023-04-06 PROCEDURE — 2580000003 HC RX 258: Performed by: INTERNAL MEDICINE

## 2023-04-06 PROCEDURE — 80048 BASIC METABOLIC PNL TOTAL CA: CPT

## 2023-04-06 PROCEDURE — 2700000000 HC OXYGEN THERAPY PER DAY

## 2023-04-06 PROCEDURE — 36415 COLL VENOUS BLD VENIPUNCTURE: CPT

## 2023-04-06 PROCEDURE — 99231 SBSQ HOSP IP/OBS SF/LOW 25: CPT | Performed by: INTERNAL MEDICINE

## 2023-04-06 PROCEDURE — 6370000000 HC RX 637 (ALT 250 FOR IP): Performed by: PHYSICIAN ASSISTANT

## 2023-04-06 PROCEDURE — 6370000000 HC RX 637 (ALT 250 FOR IP): Performed by: INTERNAL MEDICINE

## 2023-04-06 PROCEDURE — 87635 SARS-COV-2 COVID-19 AMP PRB: CPT

## 2023-04-06 PROCEDURE — 85027 COMPLETE CBC AUTOMATED: CPT

## 2023-04-06 RX ADMIN — NITROGLYCERIN 0.5 INCH: 20 OINTMENT TOPICAL at 10:11

## 2023-04-06 RX ADMIN — Medication 10 ML: at 10:12

## 2023-04-06 RX ADMIN — LEVETIRACETAM 500 MG: 500 TABLET, FILM COATED ORAL at 10:10

## 2023-04-06 RX ADMIN — FAMOTIDINE 20 MG: 20 TABLET, FILM COATED ORAL at 10:10

## 2023-04-06 RX ADMIN — METOPROLOL SUCCINATE 25 MG: 25 TABLET, EXTENDED RELEASE ORAL at 10:11

## 2023-04-06 RX ADMIN — ACETAMINOPHEN 650 MG: 325 TABLET ORAL at 10:46

## 2023-04-06 RX ADMIN — NITROGLYCERIN 0.5 INCH: 20 OINTMENT TOPICAL at 02:34

## 2023-04-06 RX ADMIN — BRIMONIDINE TARTRATE 1 DROP: 2 SOLUTION/ DROPS OPHTHALMIC at 10:10

## 2023-04-06 RX ADMIN — TIMOLOL MALEATE 1 DROP: 5 SOLUTION/ DROPS OPHTHALMIC at 10:10

## 2023-04-06 RX ADMIN — Medication 1 CAPSULE: at 10:10

## 2023-04-06 ASSESSMENT — PAIN SCALES - GENERAL
PAINLEVEL_OUTOF10: 0
PAINLEVEL_OUTOF10: 0
PAINLEVEL_OUTOF10: 3

## 2023-04-06 NOTE — PROGRESS NOTES
At 01:21 patients tele showed PAT with a heart rate of 150, which lasted approximately 5 seconds.  Patient now NS
Comprehensive Nutrition Assessment    Type and Reason for Visit:  Initial, Positive Nutrition Screen    Nutrition Recommendations/Plan:   Continue current diet order and ONS   Continue inpatient monitoring        Malnutrition Assessment:  Malnutrition Status: At risk for malnutrition (Comment) (04/05/23 1600)    Context:  Chronic Illness     Findings of the 6 clinical characteristics of malnutrition:  Energy Intake:  Mild decrease in energy intake (Comment)  Weight Loss:  No significant weight loss     Body Fat Loss:  No significant body fat loss     Muscle Mass Loss:  No significant muscle mass loss    Fluid Accumulation:  No significant fluid accumulation     Strength:  Not Performed    Nutrition Assessment:    pt admit w/ acute pulmonary edema, acute on chronic HF. PMHx of CAD s/p CABG (2001), bioprosthetic AVR (2014), NSTEMI (7/2022), HTN, HLD, CKD, hx SDH s/p fall. Pt has good intake so far %, on ONS Ensure Plus HP TID, will continue with ONS as ordered, continue to monitor while inpatient    Nutrition Related Findings:    abd soft, nontender, nondistended, active BSx4, no edema, I/O's -2.1L since admit, A&Ox4 Wound Type: Wound Consult Pending (wound to buttocks)       Current Nutrition Intake & Therapies:    Average Meal Intake: %  Average Supplements Intake: 51-75%  ADULT DIET; Regular; Low Sodium (2 gm)  ADULT ORAL NUTRITION SUPPLEMENT; Breakfast, Lunch, Dinner; Standard High Calorie/High Protein Oral Supplement    Anthropometric Measures:  Height: 5' 11\" (180.3 cm)  Ideal Body Weight (IBW): 172 lbs (78 kg)    Admission Body Weight: 149 lb (67.6 kg) (4/5 bed scale)  Current Body Weight: 149 lb (67.6 kg), 86.6 % IBW.  Weight Source: Bed Scale  Current BMI (kg/m2): 20.8  Usual Body Weight: 163 lb 6 oz (74.1 kg) (5/3/22 bed scale)  % Weight Change (Calculated): -8.8  Weight Adjustment For: No Adjustment  BMI Categories: Underweight (BMI less than 22) age over 72    Estimated Daily Nutrient
Department of Neurosurgery  Progress Note    CHIEF COMPLAINT: seen for SDH    SUBJECTIVE:  Awake and alert. Denies headache. REVIEW OF SYSTEMS :  Constitutional: Negative for chills and fever. Neurological: Negative for dizziness, tremors and speech change. OBJECTIVE:   VITALS:  BP (!) 126/50   Pulse 61   Temp 98 °F (36.7 °C) (Temporal)   Resp 16   Ht 5' 11\" (1.803 m)   Wt 147 lb 14.4 oz (67.1 kg)   SpO2 98%   BMI 20.63 kg/m²     PHYSICAL:  Constitutional: Appears well-nourished. Head: Normocephalic and atraumatic. Eyes: EOM are normal. Pupils are equal, round, and reactive to light. Neck: Normal range of motion. No tracheal deviation present. Cardiovascular: Normal rate. Pulmonary/Chest: No stridor. Abdominal: No distension. Neurological:   Oriented to person, place, and time. CN 3-12 intact  Motor strength full  Sensation intact to light touch   Skin: Skin is warm and dry.    Psychiatric: Thought content normal.      DATA:  CBC:   Lab Results   Component Value Date/Time    WBC 6.9 04/06/2023 05:08 AM    RBC 3.79 04/06/2023 05:08 AM    HGB 11.4 04/06/2023 05:08 AM    HCT 36.6 04/06/2023 05:08 AM    MCV 96.6 04/06/2023 05:08 AM    MCH 30.1 04/06/2023 05:08 AM    MCHC 31.1 04/06/2023 05:08 AM    RDW 16.1 04/06/2023 05:08 AM     04/06/2023 05:08 AM    MPV 10.2 04/06/2023 05:08 AM     BMP:    Lab Results   Component Value Date/Time     04/06/2023 05:08 AM    K 4.0 04/06/2023 05:08 AM    CL 98 04/06/2023 05:08 AM    CO2 24 04/06/2023 05:08 AM    BUN 35 04/06/2023 05:08 AM    LABALBU 2.7 04/04/2023 06:04 AM    CREATININE 1.6 04/06/2023 05:08 AM    CALCIUM 8.7 04/06/2023 05:08 AM    GFRAA >60 09/10/2022 02:45 AM    LABGLOM 40 04/06/2023 05:08 AM    GLUCOSE 102 04/06/2023 05:08 AM     PT/INR:    Lab Results   Component Value Date/Time    PROTIME 11.5 03/11/2022 07:02 PM    INR 1.0 03/11/2022 07:02 PM     PTT:    Lab Results   Component Value Date/Time    APTT 43.3 07/13/2022
Dr. Marylen Smoke notified of neurosurgery consult. Pt added to census.
Hospitalist Progress Note      SYNOPSIS: Patient admitted on 4/3/2023 for     80year-old male with past medical history significant as above who is resident of assisted living and is independent with his activities. As per patient, he was doing fine until a few days ago when he started feeling progressively worsening weakness. Then 2 days ago, he started having heaviness in his chest, which he describes as chest pressure or pain, without any radiation to the arm or jaw, associated with shortness of breath. As the symptoms are getting worse, he decided to come to the ER for further evaluation and management. In the ER, he had stable vitals and slightly abnormal labs with creatinine of 1.4, not significantly different from his baseline creatinine of 1.3. However, his BNP was 32,000. Initial Trop was 40 and EKG showed slight ST segment depression in the left lateral leads. Chest x-ray showed lung congestion and he was given a dose of Lasix 40 mg IV x1. Started on Lasix 40 mg IV twice daily. SUBJECTIVE:    Patient seen and examined in his room. Patient just came back from CT. No major overnight events. Denies any shortness of breath, nausea, vomiting. Records reviewed. Stable overnight. No other overnight issues reported. Temp (24hrs), Av.2 °F (36.8 °C), Min:97.9 °F (36.6 °C), Max:98.7 °F (37.1 °C)    DIET: ADULT DIET; Regular; Low Sodium (2 gm)  ADULT ORAL NUTRITION SUPPLEMENT; Breakfast, Lunch, Dinner; Standard High Calorie/High Protein Oral Supplement  CODE: [id number]    Intake/Output Summary (Last 24 hours) at 2023 0916  Last data filed at 2023 0549  Gross per 24 hour   Intake 360 ml   Output 1270 ml   Net -910 ml       OBJECTIVE:    /60   Pulse 67   Temp 97.9 °F (36.6 °C) (Temporal)   Resp 18   Ht 5' 11\" (1.803 m)   Wt 149 lb (67.6 kg)   SpO2 97%   BMI 20.78 kg/m²     General appearance: No apparent distress, appears stated age and cooperative.   HEENT:
Inpatient Cardiology Progress Note     PATIENT IS BEING FOLLOWED FOR: CHF    Cruz Frost is a 80 y.o. male known to Dr. Jeanine Yanez from inpatient consultation July 2022. He has a known has a past medical history of CAD s/p CABG (2001), bioprosthetic AVR (2014), NSTEMI (7/2022), HTN, HLD, CKD, hx SDH s/p fall. Presented this hospital admission with dyspnea, chest heaviness, cough and generalized weakness. SUBJECTIVE: Complains of being tired. denies CP or SOB  OBJECTIVE: No apparent distress     ROS:  Consist: Denies fevers, chills or night sweats  Heart: Denies chest pain, palpitations, lightheadedness, dizziness or syncope  Lungs: Denies SOB, cough, wheezing, orthopnea or PND  GI: Denies abdominal pain, vomiting or diarrhea    PHYSICAL EXAM:   /62   Pulse 68   Temp 97.8 °F (36.6 °C) (Temporal)   Resp 18   Ht 5' 11\" (1.803 m)   Wt 149 lb (67.6 kg)   SpO2 97%   BMI 20.78 kg/m²    B/P Range last 24 hours: Systolic (01INB), ZLR:837 , Min:113 , CZL:413    Diastolic (98VPE), JFD:21, Min:42, Max:67    CONST: Well developed, well nourished elderly male who appears of stated age. Awake, alert and cooperative. No apparent distress  HEENT:   Head- Normocephalic, atraumatic   Eyes- Conjunctivae pink, anicteric  Throat- Oral mucosa pink and moist  Neck-  No stridor, trachea midline, no jugular venous distention. No carotid bruit  CHEST: Chest symmetrical and non-tender to palpation. No accessory muscle use or intercostal retractions  RESPIRATORY:  Lung sounds - coarse breath sounds in the bases   CARDIOVASCULAR:     Heart Inspection- shows no noted pulsations  Heart Palpation- no heaves or thrills; PMI is non-displaced   Heart Ausculation- Regular rate and rhythm. 5-7/7 diastolic blow aortic area. No s3, s4 or rub   PV: No lower extremity edema. No varicosities. Pedal pulses palpable, no clubbing or cyanosis   ABDOMEN: Soft, non-tender to light palpation. Bowel sounds present.  No palpable masses no
Inpatient Cardiology Progress Note     PATIENT IS BEING FOLLOWED FOR: CHF    Cruz Frost is a 80 y.o. male known to Dr. Jeanine Yanez from inpatient consultation July 2022. He has a known has a past medical history of CAD s/p CABG (2001), bioprosthetic AVR (2014), NSTEMI (7/2022), HTN, HLD, CKD, hx SDH s/p fall. Presented this hospital admission with dyspnea, chest heaviness, cough and generalized weakness. SUBJECTIVE: denies CP or SOB  OBJECTIVE: No apparent distress     ROS:  Consist: Denies fevers, chills or night sweats  Heart: Denies chest pain, palpitations, lightheadedness, dizziness or syncope  Lungs: Denies SOB, cough, wheezing, orthopnea or PND  GI: Denies abdominal pain, vomiting or diarrhea    PHYSICAL EXAM:   BP (!) 149/69   Pulse 84   Temp 98 °F (36.7 °C) (Temporal)   Resp 16   Ht 5' 11\" (1.803 m)   Wt 154 lb (69.9 kg)   SpO2 93%   BMI 21.48 kg/m²    B/P Range last 24 hours: Systolic (00AYJ), UZU:407 , Min:137 , CJB:145    Diastolic (37NFS), FEV:05, Min:63, Max:75    CONST: Well developed, well nourished elderly male who appears of stated age. Awake, alert and cooperative. No apparent distress  HEENT:   Head- Normocephalic, atraumatic   Eyes- Conjunctivae pink, anicteric  Throat- Oral mucosa pink and moist  Neck-  No stridor, trachea midline, no jugular venous distention. No carotid bruit  CHEST: Chest symmetrical and non-tender to palpation. No accessory muscle use or intercostal retractions  RESPIRATORY:  Lung sounds - coarse breath sounds in the bases with fine crackles   CARDIOVASCULAR:     Heart Inspection- shows no noted pulsations  Heart Palpation- no heaves or thrills; PMI is non-displaced   Heart Ausculation- Regular rate and rhythm, no murmur. No s3, s4 or rub   PV: No lower extremity edema. No varicosities. Pedal pulses palpable, no clubbing or cyanosis   ABDOMEN: Soft, non-tender to light palpation. Bowel sounds present.  No palpable masses no organomegaly; no abdominal
Patient had a fall this evening and hit his head. Not syncopal per patient, but unwitnessed. Ordered Stat Ct - showed a Subdural bleed of 5mm with no mass effect or midline shift . Patient was placed on fall precautions, no neurological symptoms at this time, neurosurgery consulted. Ordered hydralazine for prn blood pressure  Strict blood pressure control- <140/80mm of Hg  Discussed with nurse- Erum Brody, if blood pressure not controlled and develops neurological deficits will need to be transferred to neuroICU for cardene drip and close neurological monitoring.       Jan Samuels MD
Physician Progress Note      PATIENT:               Daquan Johnston  CSN #:                  [id number]  :                       1930  ADMIT DATE:       4/3/2023 9:42 AM  DISCH DATE:  RESPONDING  PROVIDER #:        Jerri Srivastava MD          QUERY TEXT:    Dear Provider,    Patient s/p fell with CT imaging findings of Small, acute subdural hematoma   centered over the left temporoparietal region, measuring up to 5 mm in   thickness. If possible, please document in progress notes and discharge   summary if you are treating/evaluating any of the following: The medical record reflects the following:  Risk Factors: s/p unwitnessed fall and hit head  Clinical Indicators: Pt has unwitnessed fall at shift change right around 7pm.   He stated he was trying to stand  to use the urinal. Patient also stated he hit his head and r shoulder. CT head: Small, acute subdural hematoma centered over the left temporoparietal  region, measuring up to 5 mm in thickness. No significant mass effect or   midline shift. Neurosurgery:  80year old male with acute left 5mm SDH on head CT. Stable   neurological exam.  F/u head  CT stable.   Treatment: Imaging, Neurological exams, Neurosurgical consult    Thank Lisa Snell RN  Clinical Documentation Improvement  [phone]  Options provided:  -- Traumatic subdural hemorrhage/hematoma without LOC  -- Traumatic subdural hemorrhage/hematoma with LOC, Please specify duration,   if known  -- Nontraumatic subdural hemorrhage/hematoma  -- Other - I will add my own diagnosis  -- Disagree - Not applicable / Not valid  -- Disagree - Clinically unable to determine / Unknown  -- Refer to Clinical Documentation Reviewer    PROVIDER RESPONSE TEXT:    This patient has a traumatic subdural hemorrhage/hematoma without LOC    Query created by: Matilde Burns on 2023 12:35 PM      Electronically signed by:  Jerri Srivastava MD 2023 7:29 AM
Pt has unwitnessed fall at shift change right around 7pm. He stated he was trying to stand to use the urinal. Patient also stated he hit his head and r shoulder.  was notified and new orders were placed in epic for ct of the head and xr of r shoulder. Nurse manager, supervisior, and family also updated. Fall precautions in placed, new  and bed alarm on.  Will continue to monitor
SpO2 on room air at rest 94%  SpO2 on room air standing/taking 2 steps 94%  SpO2 on room air at rest recovery 94%    Patient unable to ambulate far distance.
Wound care: patient and son declined skin assessment due to patient discharged and waiting to leave.  Ciara Fuentes RN
No jugular venous distention. Respiratory: Clear to auscultation bilaterally, normal respiratory effort  Cardiovascular: Regular rate rhythm, normal S1-S2  Abdomen: Soft, nontender, nondistended  Musculoskeletal: No clubbing, cyanosis, no bilateral lower extremity edema. Brisk capillary refill. Skin:  No rashes  on visible skin  Neurologic: awake, alert and following commands     ASSESSMENT & PLAN  Acute on chronic systolic heart failure  Baseline EF about 45% in July 2022  Patient states that he has not been using any diuretic  Started on Lasix 40 mg IV twice daily  Consult cardiology  Obtain echocardiogram    History of coronary artery disease  Status post CABG 2001    Valvular heart disease  Bioprosthetic aortic valve replacement in 2014     Elevated troponins  Initial Trop 40 with some EKG changes  Less likely acute coronary syndrome  Trend troponin for now     Hypertension  Blood pressure well controlled  Continue current medications     CODE STATUS  Patient is limited code and has a living will and wishes only limited resuscitation with IV fluids and medications  No chest compression or intubation    DISPOSITION:   Unclear discharge disposition, needs more diuresis, possible discharge in the next 24 to 48 hours.     Medications:  REVIEWED DAILY    Infusion Medications    sodium chloride       Scheduled Medications    aspirin  81 mg Oral QAM    atorvastatin  20 mg Oral Nightly    famotidine  20 mg Oral QAM    lactobacillus  1 capsule Oral QAM    sodium chloride flush  5-40 mL IntraVENous 2 times per day    enoxaparin  40 mg SubCUTAneous Daily    furosemide  40 mg IntraVENous BID    metoprolol succinate  25 mg Oral QAM    nitroglycerin  0.5 inch Topical 4 times per day    brimonidine  1 drop Both Eyes BID    timolol  1 drop Both Eyes BID     PRN Meds: sodium chloride flush, sodium chloride, ondansetron **OR** ondansetron, polyethylene glycol, acetaminophen **OR** acetaminophen, perflutren lipid
Left ventricular internal dimensions were normal in diastole and systole. Mild asymmetric septal hypertrophy. Septal motion consistent with post cardiac surgery. Regional wall motion abnormality with mild hypokinesis of basilar inferior   and inferolateral segment. Mildly reduced left ventricular systolic dysfunction. EF 45-50%   The left atrium is mildly dilated. Focal calcification mitral valve leaflets. Moderate mitral annular calcification. Mild mitral regurgitation is present. Normally functioning bioprosthetic valve in aortic position. Mild aortic regurgitation is noted. Mild tricuspid regurgitation. Echo 4/4/23 ( Dr. Etelvina Carreno ):  Summary   The left ventricle is normal in size. Mild concentric left ventricular hypertrophy with mild additional proximal   septal thickening/hypertrophy. Ejection fraction is visually estimated at 45-50%. There is doppler evidence of stage III diastolic dysfunction. Normal right ventricular size and function. The left atrium is severely dilated. Severe posterior mitral annular calcification. Focal calcification mitral valve leaflets. No significant mitral stenosis. Mild to moderate mitral regurgitation. There is a bioprosthetic aortic valve which is well seated. the prosthetic   aortic valve leaflets appeared thin and appeared to open well. No prosthetic aortic valve stenosis. Mild prosthetic aortic valve regurgitation ( eccentric jet ). Moderate tricuspid regurgitation. Mild pulmonic regurgitation. Moderate pulmonary hypertension. CXR 4/3/2023:   Impression  Bilateral airspace disease with new findings on the left. Bilateral pleural  effusions. 12 lead EKG 4/3/23: Normal sinus rhythm. Rightward axis. Nonspecific ST and T wave abnormality     Telemetry: SR         ASSESSMENT:   Acute on chronic HFmEF, appears Eu volemic  CAD s/p CABG (2001). Chest heaviness likely due to CHF/fluid overload, resolved.  Troponin mildly
facilitate upright posture, joint and skin integrity, and interaction with environment. Pt's/ family goals   1. Return to senior living    Prognosis is good for reaching above PT goals. Patient and or family understand(s) diagnosis, prognosis, and plan of care. yes    PHYSICAL THERAPY PLAN OF CARE:    PT POC is established based on physician order and patient diagnosis     Referring provider/PT Order:  Jean-Paul Epstein MD  Diagnosis:  Acute pulmonary edema ([address] [J81.0]  Dyspnea on exertion [R06.09]  Heart failure ([address] [I50.9]  Chest pain, unspecified type [R07.9]  Specific instructions for next treatment:  Progress as tolerated    Current Treatment Recommendations:     [x] Strengthening to improve independence with functional mobility   [] ROM to improve independence with functional mobility   [x] Balance Training to improve static/dynamic balance and to reduce fall risk  [x] Endurance Training to improve activity tolerance during functional mobility   [x] Transfer Training to improve safety and independence with all functional transfers   [x] Gait Training to improve gait mechanics, endurance and assess need for appropriate assistive device  [] Stair Training in preparation for safe discharge home and/or into the community   [] Positioning to prevent skin breakdown and contractures  [x] Safety and Education Training   [x] Patient/Caregiver Education   [] HEP  [] Other     PT long term treatment goals are located in above grid    Frequency of treatments: 2-5x/week x 1-2 weeks. Time in  1325  Time out  1345    Total Treatment Time  10 minutes     Evaluation Time includes thorough review of current medical information, gathering information on past medical history/social history and prior level of function, completion of standardized testing/informal observation of tasks, assessment of data and education on plan of care and goals.     CPT codes:  [x] Low Complexity PT evaluation 65772  [] Moderate Complexity PT
of standardized testing, informal observation of tasks, consultation with other medical professions/disciplines, assessment of data & development of POC/goals. Time In: 1445  Time Out: 1530  Total Treatment Time: 30    Min Units   OT Eval Low 89522  x     OT Eval Medium 27399      OT Eval High 27217      OT Re-Eval [id number]       Therapeutic Ex 34297       Therapeutic Activities 80354  15  1   ADL/Self Care 61246  15  1   Orthotic Management 77258       Manual 67056     Neuro Re-Ed 50361       Non-Billable Time          Evaluation Time additionally includes thorough review of current medical information, gathering information on past medical history/social history and prior level of function, interpretation of standardized testing/informal observation of tasks, assessment of data and development of plan of care and goals.         SRIDHAR Mckeon OTR/L; [id number]

## 2023-04-06 NOTE — PLAN OF CARE
Problem: Safety - Adult  Goal: Free from fall injury  4/6/2023 1143 by Viral Chavira RN  Outcome: Progressing     Problem: ABCDS Injury Assessment  Goal: Absence of physical injury  4/6/2023 1143 by Viral Chavira RN  Outcome: Progressing     Problem: Skin/Tissue Integrity  Goal: Absence of new skin breakdown  Description: 1. Monitor for areas of redness and/or skin breakdown  2. Assess vascular access sites hourly  3. Every 4-6 hours minimum:  Change oxygen saturation probe site  4. Every 4-6 hours:  If on nasal continuous positive airway pressure, respiratory therapy assess nares and determine need for appliance change or resting period.   4/6/2023 1143 by Viral Chavira RN  Outcome: Progressing     Problem: Cardiovascular - Adult  Goal: Maintains optimal cardiac output and hemodynamic stability  4/6/2023 1143 by Viral Chavira RN  Outcome: Progressing     Problem: Metabolic/Fluid and Electrolytes - Adult  Goal: Hemodynamic stability and optimal renal function maintained  4/6/2023 1143 by Viral Chavira RN  Outcome: Progressing     Problem: Pain  Goal: Verbalizes/displays adequate comfort level or baseline comfort level  Outcome: Progressing

## 2023-04-06 NOTE — CARE COORDINATION
Here from the [facility] due to concerns of increased weakness and chest pain. Patient is on IV Lasix BID. Heart failure nurse has been consulted. pt eval -15/24 Neurosurgery consult today-after fall here- last evening- Stat Ct - showed a Subdural bleed of 5mm with no mass effect or midline shift . Patient was placed on fall precautions, no neurological symptoms at this time, neurosurgery was consulted. Per NS -Stable neurological exam.  F/u head CT stable. Per prior sw note-Patient resides at [facility] an [facility]. He is independent with mobility, dressing, and eating. Staff assists with bathing, housekeeping, laundry, meals, and med administration. Patient uses a FWW to transfer to his wheelchair which he is able to propel independently. No oxygen needs at [facility]. Patient has a history at [facility] and with [employer]. If either is needed, he has choiced to use them again. PCP is Dr. Unruly Rodríguez in [place]. Krish Webber Discharge plan prior to fall return to [facility] with no needs. Family will transport. Cm/sw to continue to follow. Electronically signed by Ashtyn Wright RN on 4/5/2023 at 12:36 PM
Social Work/Case Management Transition of Care Planning (Chris Urbina [place] [phone]): Per report and chart review, patient is on 2L NC at 98%. Room air is baseline. Wean oxygen as able. Neurosurgery has been consulted. Patient is to be on keppra x7 days. Follow up outpatient in 4 weeks. Cardiology is following. IV diuresis has been transitioned to oral.  PT score noted to be 15. He transferred with min assist.  He ambulated 2' with fww and min assist.   Discharge plan is to return to [facility] with no needs. Family will transport. CM/SW will follow. KIMI Watkins  4/6/2023    Update: Discharge order noted. Spoke with patient's son, Han Fletcher. He will provide transport back to [facility]. Spoke with nursing who will attempt to wean oxygen. Home oxygen will need set up if patient is unable to tolerate wean. KIMI Watkins  4/6/2023     Update:  Per nursing, patient's oxygen was 94% on room air. No home oxygen will be needed.   KIMI Watkins  4/6/2023
[phone]      Notes:    Factors facilitating achievement of predicted outcomes:     Barriers to discharge: Additional Case Management Notes: The Plan for Transition of Care is related to the following treatment goals of Acute pulmonary edema (HCC) [J81.0]  Dyspnea on exertion [R06.09]  Heart failure ([address] [I50.9]  Chest pain, unspecified type [N43.1]    IF APPLICABLE: The Patient and/or patient representative Author Cornelio and his family were provided with a choice of provider and agrees with the discharge plan.  Freedom of choice list with basic dialogue that supports the patient's individualized plan of care/goals and shares the quality data associated with the providers was provided to:     Patient Representative Name:       The Patient and/or Patient Representative Agree with the Discharge Plan      Jessica Cisse  Case Management Department  Ph: [phone]

## 2023-04-06 NOTE — PLAN OF CARE
Problem: Safety - Adult  Goal: Free from fall injury  4/5/2023 2219 by Nelly Bill RN  Outcome: Progressing  4/5/2023 1114 by Delwin Councilman, RN  Outcome: Progressing     Problem: ABCDS Injury Assessment  Goal: Absence of physical injury  4/5/2023 2219 by Nelly Bill RN  Outcome: Progressing  4/5/2023 1114 by Delwin Councilman, RN  Outcome: Progressing     Problem: Skin/Tissue Integrity  Goal: Absence of new skin breakdown  Description: 1. Monitor for areas of redness and/or skin breakdown  2. Assess vascular access sites hourly  3. Every 4-6 hours minimum:  Change oxygen saturation probe site  4. Every 4-6 hours:  If on nasal continuous positive airway pressure, respiratory therapy assess nares and determine need for appliance change or resting period.   4/5/2023 2219 by Nelly Bill RN  Outcome: Progressing  4/5/2023 1114 by Delwin Councilman, RN  Outcome: Progressing     Problem: Cardiovascular - Adult  Goal: Maintains optimal cardiac output and hemodynamic stability  4/5/2023 2219 by eNlly Bill RN  Outcome: Progressing  4/5/2023 1114 by Delwin Councilman, RN  Outcome: Progressing     Problem: Metabolic/Fluid and Electrolytes - Adult  Goal: Hemodynamic stability and optimal renal function maintained  4/5/2023 2219 by Nelly Bill RN  Outcome: Progressing  4/5/2023 1114 by Delwin Councilman, RN  Outcome: Progressing

## 2023-04-06 NOTE — DISCHARGE SUMMARY
CREATININE 1.4* 1.3* 1.7*     LFT:  Recent Labs     04/03/23  1206 04/04/23  0604   PROT 7.0 5.7*   ALKPHOS 192* 163*   ALT 24 20   AST 44* 40*   BILITOT 0.9 0.9     PT/INR: No results for input(s): INR, APTT in the last 72 hours. BNP: No results for input(s): BNP in the last 72 hours. Hgb A1C:   Lab Results   Component Value Date    LABA1C 5.8 (H) 05/06/2022     Folate and B12:   Lab Results   Component Value Date    IMTUXJUW86 1391 (H) 05/05/2022   ,   Lab Results   Component Value Date    FOLATE >20.0 05/05/2022     Thyroid Studies: No results found for: TSH, P4OXJSF, K7YCRGE, THYROIDAB    Urinalysis:    Lab Results   Component Value Date/Time    NITRU Negative 07/09/2022 08:14 PM    WBCUA NONE 07/09/2022 08:14 PM    BACTERIA NONE SEEN 07/09/2022 08:14 PM    RBCUA NONE 07/09/2022 08:14 PM    BLOODU Negative 07/09/2022 08:14 PM    SPECGRAV 1.025 07/09/2022 08:14 PM    GLUCOSEU Negative 07/09/2022 08:14 PM       Imaging:  XR CHEST (2 VW)    Result Date: 4/3/2023  EXAMINATION: TWO XRAY VIEWS OF THE CHEST 4/3/2023 9:22 am COMPARISON: 10 September 2022 HISTORY: ORDERING SYSTEM PROVIDED HISTORY: chest pain, r/o PTX TECHNOLOGIST PROVIDED HISTORY: Reason for exam:->chest pain, r/o PTX What reading provider will be dictating this exam?->CRC FINDINGS: There is bilateral airspace disease with new findings on the left. There are bilateral pleural effusions. Unchanged cardiomediastinal silhouette. .  There are bilateral pleural effusions. Bilateral airspace disease with new findings on the left. Bilateral pleural effusions. XR SHOULDER RIGHT 1 VW    Result Date: 4/4/2023  EXAMINATION: ONE XRAY VIEW OF THE RIGHT SHOULDER 4/4/2023 8:15 pm COMPARISON: None. HISTORY: ORDERING SYSTEM PROVIDED HISTORY: Fall, hit shoulder TECHNOLOGIST PROVIDED HISTORY: Reason for exam:->Fall, hit shoulder What reading provider will be dictating this exam?->CRC FINDINGS: No fracture, dislocation or osseous lesion.   Moderate AC joint and
Caps     metoprolol succinate 25 MG extended release tablet  Commonly known as: TOPROL XL     Travoprost (ABHISHEK Free) 0.004 % Soln ophthalmic solution  Commonly known as: TRAVATAN Z     vitamin B-12 1000 MCG tablet  Commonly known as: CYANOCOBALAMIN     vitamin B-6 100 MG tablet  Commonly known as: PYRIDOXINE     Vitamin D3 50 MCG (2000 UT) Tabs            STOP taking these medications      aspirin 81 MG chewable tablet     nitroGLYCERIN 0.4 MG SL tablet  Commonly known as: NITROSTAT               Where to Get Your Medications        These medications were sent to [facility] [place], [address], [address]      Phone: [phone]   furosemide 20 MG tablet  levETIRAcetam 500 MG tablet         Time Spent on discharge is more than 45 minutes in the examination, evaluation, counseling and review of medications and discharge plan.    +++++++++++++++++++++++++++++++++++++++++++++++++  Jocelyne Parra MD  [address]  +++++++++++++++++++++++++++++++++++++++++++++++++  NOTE: This report was transcribed using voice recognition software. Every effort was made to ensure accuracy; however, inadvertent computerized transcription errors may be present.   @

## 2023-04-07 DIAGNOSIS — S06.5XAA SUBDURAL HEMATOMA (HCC): Primary | ICD-10-CM

## 2023-04-10 PROBLEM — R62.7 FAILURE TO THRIVE IN ADULT: Status: ACTIVE | Noted: 2023-04-10

## 2023-04-14 ASSESSMENT — EJECTION FRACTION
EF_SOURCE: 2D ECHO
EF_VALUE: 45-50

## 2023-04-18 ENCOUNTER — TELEPHONE (OUTPATIENT)
Dept: CARDIOLOGY CLINIC | Age: 88
End: 2023-04-18

## 2023-04-18 NOTE — TELEPHONE ENCOUNTER
\" Spoke with Daljit Harrison at [employer] [address] and confirmed that Mr. Butch Minaya has transportation set up to  at 9:30 Am for appointment with Cyndy Drummond NP tomorrow at 9:30 AM at the [facility]\" CF

## 2023-04-19 ENCOUNTER — HOSPITAL ENCOUNTER (OUTPATIENT)
Dept: OTHER | Age: 88
Setting detail: THERAPIES SERIES
Discharge: HOME | End: 2023-04-19
Payer: MEDICARE

## 2023-04-19 VITALS
DIASTOLIC BLOOD PRESSURE: 52 MMHG | BODY MASS INDEX: 19.52 KG/M2 | HEART RATE: 58 BPM | WEIGHT: 139.4 LBS | SYSTOLIC BLOOD PRESSURE: 132 MMHG | OXYGEN SATURATION: 94 % | HEIGHT: 71 IN | RESPIRATION RATE: 16 BRPM

## 2023-04-19 DIAGNOSIS — I50.33 ACUTE ON CHRONIC HEART FAILURE WITH PRESERVED EJECTION FRACTION (HCC): Primary | ICD-10-CM

## 2023-04-19 LAB
ANION GAP SERPL CALCULATED.3IONS-SCNC: 8 MMOL/L (ref 7–16)
BNP BLD-MCNC: 9251 PG/ML (ref 0–450)
BUN SERPL-MCNC: 33 MG/DL (ref 6–23)
CALCIUM SERPL-MCNC: 9.3 MG/DL (ref 8.6–10.2)
CHLORIDE SERPL-SCNC: 101 MMOL/L (ref 98–107)
CO2 SERPL-SCNC: 27 MMOL/L (ref 22–29)
CREAT SERPL-MCNC: 1.6 MG/DL (ref 0.7–1.2)
GLUCOSE SERPL-MCNC: 164 MG/DL (ref 74–99)
POTASSIUM SERPL-SCNC: 4.2 MMOL/L (ref 3.5–5)
SODIUM SERPL-SCNC: 136 MMOL/L (ref 132–146)

## 2023-04-19 PROCEDURE — 80048 BASIC METABOLIC PNL TOTAL CA: CPT

## 2023-04-19 PROCEDURE — 83880 ASSAY OF NATRIURETIC PEPTIDE: CPT

## 2023-04-19 PROCEDURE — 36415 COLL VENOUS BLD VENIPUNCTURE: CPT

## 2023-04-19 ASSESSMENT — PAIN SCALES - GENERAL: PAINLEVEL_OUTOF10: 0

## 2023-04-19 ASSESSMENT — SLEEP AND FATIGUE QUESTIONNAIRES: NECK CIRCUMFERENCE (INCHES): 14.25

## 2023-04-19 ASSESSMENT — PATIENT HEALTH QUESTIONNAIRE - PHQ9
SUM OF ALL RESPONSES TO PHQ9 QUESTIONS 1 & 2: 2
1. LITTLE INTEREST OR PLEASURE IN DOING THINGS: SEVERAL DAYS
2. FEELING DOWN, DEPRESSED OR HOPELESS: SEVERAL DAYS
10. IF YOU CHECKED OFF ANY PROBLEMS, HOW DIFFICULT HAVE THESE PROBLEMS MADE IT FOR YOU TO DO YOUR WORK, TAKE CARE OF THINGS AT HOME, OR GET ALONG WITH OTHER PEOPLE: NOT DIFFICULT AT ALL

## 2023-04-19 NOTE — FLOWSHEET NOTE
IN REHAB, WANTS TO GET STRONGER TO GET BACK TO ASSISTED LIVING. SON IS DPOA AND PRESENT.  PATIENT TO FOLLOW WITH SHABANA PHYSICIAN AT [facility] REGARDING PHQ 2 SCORE OF 2

## 2023-04-19 NOTE — PROGRESS NOTES
Est, Glom Filt Rate 51  51   FOLATE, FOLAT  >20.0    Magnesium 2.0     Glucose, Random 102 (H)  143 (H)   CALCIUM, SERUM, 371302 9.0  9.1   Phosphorus 3.1     Total Protein   6.8   Vitamin B-12  >2000 (H)    Pro-BNP  11,520 (H)    Albumin   3.1 (L)   Alk Phos   162 (H)   ALT   25   AST   46 (H)   BILIRUBIN TOTAL   0.7   Prealbumin 10 (L)     Vit D, 25-Hydroxy  41    WBC   7.5   RBC   4.13   Hemoglobin Quant   12.2 (L)   Hematocrit   39.5   MCV   95.6   MCH   29.5   MCHC   30.9 (L)   MPV   10.0   RDW   15.9 (H)   Platelet Count   859       IMAGING:        CARDIAC TESTING:        ASSESSMENT:    Acute on chronic HFmEF, appears Eu volemic  CAD s/p CABG (2001). Chest heaviness likely due to CHF/fluid overload, resolved. Troponin mildly elevated, similar to prior. Non specific ST changes on EKG. Hx NSTEMI (7/2022)  Valvular heart disease s/p AVR with bioprosthesis (2014). Mild AI and mild to moderate MR, moderate TR and moderate pulmonary on echo (4/4/2023)  Right hydropneumothorax s/p thoracentesis (400 cc exudate) (7/2022)  RANDAL on CKD s/p IV diuresis  HTN, controlled  HLD  Hx SDH s/p fall                          DNR-CCA code status        PLAN:  Discontinue IV diuresis. Monitor I/O, daily weights, BMP, BNP. Will need to be on PO lasix prior to discharge  Continue aspirin  Continue atorvastatin   Decrease nitropaste. Consider changing to oral nitrates  5. Consider ACEi therapy ( vs nitrates / hydralazine combination ) pending FU renal function.   6.  Will follow    PLAN:  Get blood work today  Continue current cardiac medications   Daily weights  Continue PT  Follow up with CHF clinic in 2-3 weeks  Follow up with Dr. Yesy Victor in 3 months     Weigh yourself daily    -Stay Hydrated, 64 oz     -Diet should sodium restricted to 2 grams    -Again watch your daily weight trends and if you gain water weight please follow below instructions.    -If you gain 3-5 pounds in 2-3 days OR notice that you are retaining fluid in anyway

## 2023-04-20 ENCOUNTER — TELEPHONE (OUTPATIENT)
Dept: OTHER | Age: 88
End: 2023-04-20

## 2023-04-20 NOTE — TELEPHONE ENCOUNTER
Randy Summers at [place] called to ask for clarification of something she did not name on the message. Called back at [phone], Randy Summers was on the other line, left a message for her to call back.

## 2023-05-03 ENCOUNTER — HOSPITAL ENCOUNTER (EMERGENCY)
Age: 88
Discharge: HOME | End: 2023-05-03
Attending: EMERGENCY MEDICINE
Payer: MEDICARE

## 2023-05-03 ENCOUNTER — APPOINTMENT (OUTPATIENT)
Dept: GENERAL RADIOLOGY | Age: 88
End: 2023-05-03
Payer: MEDICARE

## 2023-05-03 VITALS
DIASTOLIC BLOOD PRESSURE: 68 MMHG | HEIGHT: 73 IN | HEART RATE: 86 BPM | RESPIRATION RATE: 12 BRPM | SYSTOLIC BLOOD PRESSURE: 132 MMHG | TEMPERATURE: 98.7 F | OXYGEN SATURATION: 98 % | BODY MASS INDEX: 18.69 KG/M2 | WEIGHT: 141 LBS

## 2023-05-03 DIAGNOSIS — I95.9 TRANSIENT HYPOTENSION: Primary | ICD-10-CM

## 2023-05-03 LAB
ANION GAP SERPL CALCULATED.3IONS-SCNC: 8 MMOL/L (ref 7–16)
BASOPHILS # BLD: 0.08 E9/L (ref 0–0.2)
BASOPHILS NFR BLD: 1.2 % (ref 0–2)
BUN SERPL-MCNC: 31 MG/DL (ref 6–23)
CALCIUM SERPL-MCNC: 8.8 MG/DL (ref 8.6–10.2)
CHLORIDE SERPL-SCNC: 103 MMOL/L (ref 98–107)
CO2 SERPL-SCNC: 26 MMOL/L (ref 22–29)
CREAT SERPL-MCNC: 1.5 MG/DL (ref 0.7–1.2)
EKG ATRIAL RATE: 55 BPM
EKG P AXIS: 29 DEGREES
EKG P-R INTERVAL: 146 MS
EKG Q-T INTERVAL: 472 MS
EKG QRS DURATION: 84 MS
EKG QTC CALCULATION (BAZETT): 451 MS
EKG R AXIS: -18 DEGREES
EKG T AXIS: 137 DEGREES
EKG VENTRICULAR RATE: 55 BPM
EOSINOPHIL # BLD: 0.49 E9/L (ref 0.05–0.5)
EOSINOPHIL NFR BLD: 7.1 % (ref 0–6)
ERYTHROCYTE [DISTWIDTH] IN BLOOD BY AUTOMATED COUNT: 16.2 FL (ref 11.5–15)
GLUCOSE SERPL-MCNC: 125 MG/DL (ref 74–99)
HCT VFR BLD AUTO: 34.4 % (ref 37–54)
HGB BLD-MCNC: 10.8 G/DL (ref 12.5–16.5)
IMM GRANULOCYTES # BLD: 0.03 E9/L
IMM GRANULOCYTES NFR BLD: 0.4 % (ref 0–5)
LYMPHOCYTES # BLD: 1.82 E9/L (ref 1.5–4)
LYMPHOCYTES NFR BLD: 26.5 % (ref 20–42)
MCH RBC QN AUTO: 30.2 PG (ref 26–35)
MCHC RBC AUTO-ENTMCNC: 31.4 % (ref 32–34.5)
MCV RBC AUTO: 96.1 FL (ref 80–99.9)
MONOCYTES # BLD: 0.87 E9/L (ref 0.1–0.95)
MONOCYTES NFR BLD: 12.6 % (ref 2–12)
NEUTROPHILS # BLD: 3.59 E9/L (ref 1.8–7.3)
NEUTS SEG NFR BLD: 52.2 % (ref 43–80)
PLATELET # BLD AUTO: 123 E9/L (ref 130–450)
PMV BLD AUTO: 10.7 FL (ref 7–12)
POTASSIUM SERPL-SCNC: 4.9 MMOL/L (ref 3.5–5)
RBC # BLD AUTO: 3.58 E12/L (ref 3.8–5.8)
SODIUM SERPL-SCNC: 137 MMOL/L (ref 132–146)
WBC # BLD: 6.9 E9/L (ref 4.5–11.5)

## 2023-05-03 PROCEDURE — 99285 EMERGENCY DEPT VISIT HI MDM: CPT

## 2023-05-03 PROCEDURE — 93005 ELECTROCARDIOGRAM TRACING: CPT

## 2023-05-03 PROCEDURE — 85025 COMPLETE CBC W/AUTO DIFF WBC: CPT

## 2023-05-03 PROCEDURE — 93010 ELECTROCARDIOGRAM REPORT: CPT | Performed by: INTERNAL MEDICINE

## 2023-05-03 PROCEDURE — 71045 X-RAY EXAM CHEST 1 VIEW: CPT

## 2023-05-03 PROCEDURE — 36415 COLL VENOUS BLD VENIPUNCTURE: CPT

## 2023-05-03 PROCEDURE — 80048 BASIC METABOLIC PNL TOTAL CA: CPT

## 2023-05-03 ASSESSMENT — LIFESTYLE VARIABLES
HOW OFTEN DO YOU HAVE A DRINK CONTAINING ALCOHOL: NEVER
HOW MANY STANDARD DRINKS CONTAINING ALCOHOL DO YOU HAVE ON A TYPICAL DAY: PATIENT DOES NOT DRINK

## 2023-05-03 ASSESSMENT — PAIN - FUNCTIONAL ASSESSMENT
PAIN_FUNCTIONAL_ASSESSMENT: NONE - DENIES PAIN
PAIN_FUNCTIONAL_ASSESSMENT: NONE - DENIES PAIN

## 2023-05-03 NOTE — ED NOTES
Discharge instructions given. Patient verbalizes understanding. No other noted or stated problems at this time. Patient will follow up with primary care.       [facility]se [facility]En[facility]  05/03/23 0944

## 2023-05-03 NOTE — ED PROVIDER NOTES
[address] [facility] ENCOUNTER        Pt Name: Camilla Negro  MRN: [id number]  [place] 1/16/1930  Date of evaluation: 5/3/2023  Provider: Ju Lopez DO  PCP: Aniket Lemus MD  Note Started: 1:17 PM EDT 5/3/23    CHIEF COMPLAINT       Chief Complaint   Patient presents with    Hypotension    Hyperglycemia       HISTORY OF PRESENT ILLNESS: 1 or more Elements   History From: Patient and EMS    Limitations to history : None    Camilla Negro is a 80 y.o. male who presents to the emergency department with chief complaint of hypotension at outside nursing facility as well as hyperglycemia. Per EMS patient was found to be 60s over 40s at the nursing facility this morning and because of this they contacted EMS. On EMS arrival patient's blood pressure was in the low 729Y systolic and patient is asymptomatic. Point-of-care glucose was found to be 300s however patient stated he just had a large meal with apple pie as well. Patient is feeling quite well and having no significant symptoms or concerns. Patient denies any lightheadedness or dizziness, fever or chills, nausea or vomiting, chest pain, shortness of breath, abdominal pain, hematuria or dysuria, constipation or diarrhea. Nursing Notes were all reviewed and agreed with or any disagreements were addressed in the HPI. REVIEW OF SYSTEMS :      Positives and Pertinent negatives as per HPI.      SURGICAL HISTORY     Past Surgical History:   Procedure Laterality Date    APPENDECTOMY      BACK SURGERY      CABG WITH AORTIC VALVE REPAIR         CURRENTMEDICATIONS       Discharge Medication List as of 5/3/2023  2:36 PM        CONTINUE these medications which have NOT CHANGED    Details   metoprolol succinate (TOPROL XL) 25 MG extended release tablet Take 0.5 tablets by mouth every morning Indications: -HOLD IF SBP<100 OR P<60-, Disp-30 tablet, R-3DC to SNF      nitroGLYCERIN (NITROSTAT) 0.4 MG SL

## 2023-05-05 ENCOUNTER — HOSPITAL ENCOUNTER (OUTPATIENT)
Dept: OTHER | Age: 88
Discharge: HOME | End: 2023-05-05

## 2023-05-09 ENCOUNTER — OFFICE VISIT (OUTPATIENT)
Dept: NEUROSURGERY | Age: 88
End: 2023-05-09
Payer: MEDICARE

## 2023-05-09 ENCOUNTER — HOSPITAL ENCOUNTER (OUTPATIENT)
Dept: CT IMAGING | Age: 88
Discharge: HOME | End: 2023-05-11
Payer: MEDICARE

## 2023-05-09 VITALS — RESPIRATION RATE: 16 BRPM

## 2023-05-09 DIAGNOSIS — S06.5XAA SUBDURAL HEMATOMA (HCC): Primary | ICD-10-CM

## 2023-05-09 DIAGNOSIS — S06.5XAA SUBDURAL HEMATOMA (HCC): ICD-10-CM

## 2023-05-09 PROCEDURE — 99212 OFFICE O/P EST SF 10 MIN: CPT

## 2023-05-09 PROCEDURE — 70450 CT HEAD/BRAIN W/O DYE: CPT

## 2023-05-09 PROCEDURE — 99213 OFFICE O/P EST LOW 20 MIN: CPT | Performed by: STUDENT IN AN ORGANIZED HEALTH CARE EDUCATION/TRAINING PROGRAM

## 2023-05-09 PROCEDURE — 1123F ACP DISCUSS/DSCN MKR DOCD: CPT | Performed by: STUDENT IN AN ORGANIZED HEALTH CARE EDUCATION/TRAINING PROGRAM

## 2023-05-09 PROCEDURE — 1036F TOBACCO NON-USER: CPT | Performed by: STUDENT IN AN ORGANIZED HEALTH CARE EDUCATION/TRAINING PROGRAM

## 2023-05-09 PROCEDURE — G8420 CALC BMI NORM PARAMETERS: HCPCS | Performed by: STUDENT IN AN ORGANIZED HEALTH CARE EDUCATION/TRAINING PROGRAM

## 2023-05-09 PROCEDURE — G8427 DOCREV CUR MEDS BY ELIG CLIN: HCPCS | Performed by: STUDENT IN AN ORGANIZED HEALTH CARE EDUCATION/TRAINING PROGRAM

## 2023-05-09 NOTE — PROGRESS NOTES
Hospital Follow-up     This is a 80year old male who presents to the office for a 1 month follow-up s/p SDH     Subjective: Patient presents from the [facility]. He states he is doing well. He does admit to a recent fall where he hit his head. Denies any HA. No new numbness or weakness. No other complaints. Head CT reviewed with patient. Physical Exam:              WDWN, no apparent distress              Non-labored breathing               Vitals Stable              Alert and oriented x3              PERRL              EOMI              GIL well              Motor strength symmetric              Sensation to LT intact bilaterally   (-)drift                 Imagin2023 Head CT  Left mixed density subdural collection with new blood when compared to previous CT scan- final read pending. Assessment: This is a 80 y.o.  male presenting for a 1 month follow-up s/p SDH. Plan:  -Pain control and expectations discussed  -Continue to remain off any AP/AC medications   -OARRS report reviewed   -Follow-up in neurosurgery clinic in 1 month with repeat Head CT  -Call or return to neurosurgery office sooner if symptoms worsen or if new issues arise in the interim.     Electronically signed by Jase Laureano PA-C on 2023 at 4:17 PM

## 2023-05-10 DIAGNOSIS — S06.5XAA SUBDURAL HEMATOMA (HCC): Primary | ICD-10-CM

## 2023-05-16 ENCOUNTER — TELEPHONE (OUTPATIENT)
Dept: OTHER | Age: 88
End: 2023-05-16

## 2023-05-16 ENCOUNTER — HOSPITAL ENCOUNTER (OUTPATIENT)
Dept: OTHER | Age: 88
Setting detail: THERAPIES SERIES
Discharge: HOME | End: 2023-05-16
Payer: MEDICARE

## 2023-05-16 VITALS
DIASTOLIC BLOOD PRESSURE: 53 MMHG | OXYGEN SATURATION: 99 % | SYSTOLIC BLOOD PRESSURE: 123 MMHG | WEIGHT: 146 LBS | RESPIRATION RATE: 17 BRPM | HEART RATE: 120 BPM | BODY MASS INDEX: 19.26 KG/M2

## 2023-05-16 LAB
ANION GAP SERPL CALCULATED.3IONS-SCNC: 7 MMOL/L (ref 7–16)
BNP BLD-MCNC: ABNORMAL PG/ML (ref 0–450)
BUN SERPL-MCNC: 31 MG/DL (ref 6–23)
CALCIUM SERPL-MCNC: 9 MG/DL (ref 8.6–10.2)
CHLORIDE SERPL-SCNC: 102 MMOL/L (ref 98–107)
CO2 SERPL-SCNC: 23 MMOL/L (ref 22–29)
CREAT SERPL-MCNC: 1.5 MG/DL (ref 0.7–1.2)
EKG ATRIAL RATE: 236 BPM
EKG Q-T INTERVAL: 342 MS
EKG QRS DURATION: 102 MS
EKG QTC CALCULATION (BAZETT): 485 MS
EKG R AXIS: 1 DEGREES
EKG T AXIS: 108 DEGREES
EKG VENTRICULAR RATE: 121 BPM
GLUCOSE SERPL-MCNC: 119 MG/DL (ref 74–99)
POTASSIUM SERPL-SCNC: 4.9 MMOL/L (ref 3.5–5)
SODIUM SERPL-SCNC: 132 MMOL/L (ref 132–146)

## 2023-05-16 PROCEDURE — 80048 BASIC METABOLIC PNL TOTAL CA: CPT

## 2023-05-16 PROCEDURE — 99204 OFFICE O/P NEW MOD 45 MIN: CPT

## 2023-05-16 PROCEDURE — 36415 COLL VENOUS BLD VENIPUNCTURE: CPT

## 2023-05-16 PROCEDURE — 83880 ASSAY OF NATRIURETIC PEPTIDE: CPT

## 2023-05-16 PROCEDURE — 96374 THER/PROPH/DIAG INJ IV PUSH: CPT

## 2023-05-16 PROCEDURE — 2580000003 HC RX 258

## 2023-05-16 PROCEDURE — 6360000002 HC RX W HCPCS

## 2023-05-16 RX ORDER — FUROSEMIDE 10 MG/ML
40 INJECTION INTRAMUSCULAR; INTRAVENOUS ONCE
Status: COMPLETED | OUTPATIENT
Start: 2023-05-16 | End: 2023-05-16

## 2023-05-16 RX ORDER — FUROSEMIDE 10 MG/ML
INJECTION INTRAMUSCULAR; INTRAVENOUS
Status: COMPLETED
Start: 2023-05-16 | End: 2023-05-16

## 2023-05-16 RX ORDER — SODIUM CHLORIDE 0.9 % (FLUSH) 0.9 %
SYRINGE (ML) INJECTION
Status: COMPLETED
Start: 2023-05-16 | End: 2023-05-16

## 2023-05-16 RX ORDER — SODIUM CHLORIDE 0.9 % (FLUSH) 0.9 %
10 SYRINGE (ML) INJECTION PRN
Status: DISCONTINUED | OUTPATIENT
Start: 2023-05-16 | End: 2023-05-17 | Stop reason: HOSPADM

## 2023-05-16 RX ORDER — FUROSEMIDE 20 MG/1
20 TABLET ORAL DAILY
Qty: 60 TABLET | Refills: 3 | Status: SHIPPED | OUTPATIENT
Start: 2023-05-16

## 2023-05-16 RX ORDER — METOPROLOL SUCCINATE 25 MG/1
12.5 TABLET, EXTENDED RELEASE ORAL NIGHTLY
Qty: 30 TABLET | Refills: 3 | Status: SHIPPED | OUTPATIENT
Start: 2023-05-16

## 2023-05-16 RX ADMIN — FUROSEMIDE 40 MG: 10 INJECTION INTRAMUSCULAR; INTRAVENOUS at 13:47

## 2023-05-16 RX ADMIN — Medication 10 ML: at 13:46

## 2023-05-16 RX ADMIN — FUROSEMIDE 40 MG: 10 INJECTION, SOLUTION INTRAMUSCULAR; INTRAVENOUS at 13:47

## 2023-05-16 RX ADMIN — SODIUM CHLORIDE, PRESERVATIVE FREE 10 ML: 5 INJECTION INTRAVENOUS at 13:46

## 2023-05-16 NOTE — TELEPHONE ENCOUNTER
Received call from CHF clinic, spoke with [facility] RN. Patient in atrial flutter in clinic with rates 120. He refused to be evaluated in the ED. He recently was taken off his Toprol and Lasix due to reported hypotension. Vitals today: 123/53, 120    Will have him restart Toprol 12.5 nightly  Lasix 20 mg daily     Will have Dr. Arleen Parsons review EKG and give [facility] recommendations given recent SDH s/p fall.

## 2023-05-16 NOTE — PROGRESS NOTES
Congestive Heart Failure [address] [facility]   1/16/1930          Referring Provider: Pradip Zapata  Primary Care Physician: Dr. Dori Watson   Cardiologist: Monica Chen   Nephrologist: None        History of Present Illness:     Rubin Lawson is a 80 y.o. male with a history of HFpEF, most recent EF 48%. Patient Story:    He does not  have dyspnea with exertion, shortness of breath, or decline in overall functional capacity. He does not have orthopnea, PND, nocturnal cough or hemoptysis. He does not have abdominal distention or bloating, early satiety, anorexia/change in appetite. He is not currently on an oral diuretic. He does have  lower extremity edema. He denies lightheadedness, dizziness. He denies palpitations, syncope or near syncope. He does not complain of chest pain, pressure, discomfort. No Known Allergies      No outpatient medications have been marked as taking for the 5/16/23 encounter [facility] Encounter) with [facility]OM [facility]. Current Outpatient Medications on File Prior to Encounter   Medication Sig Dispense Refill    metoprolol succinate (TOPROL XL) 25 MG extended release tablet Take 0.5 tablets by mouth every morning Indications: -HOLD IF SBP<100 OR P<60- (Patient not taking: Reported on 5/16/2023) 30 tablet 3    nitroGLYCERIN (NITROSTAT) 0.4 MG SL tablet Place 1 tablet under the tongue every 5 minutes as needed for Chest pain up to max of 3 total doses.  If no relief after 1 dose, call 911. 25 tablet 3    acetaminophen (TYLENOL) 325 MG tablet Take 2 tablets by mouth daily      furosemide (LASIX) 20 MG tablet Take 1 tablet by mouth daily (Patient not taking: Reported on 5/16/2023) 60 tablet 3    Menthol, Topical Analgesic, (BIOFREEZE) 4 % GEL Apply topically every 6 hours as needed (RIGHT KNEE PAIN)      Lactobacillus (ACIDOPHILUS) TABS Take 1 tablet by mouth every morning      brimonidine-timolol (COMBIGAN) 0.2-0.5 % ophthalmic solution

## 2023-05-19 NOTE — TELEPHONE ENCOUNTER
Hello. I reached out to the office Tuesday regarding this and have not heard anything back. Can you please review and advise?  Thank you

## 2023-05-19 NOTE — TELEPHONE ENCOUNTER
At this time, we would not recommend starting AP/AC medications due to the fact that the last head CT had acute blood. Patient does have a repeat Head CT ordered, if this shows no acute blood, then at that point we would then be able to start AP/AC medications.

## 2023-06-02 ENCOUNTER — HOSPITAL ENCOUNTER (OUTPATIENT)
Dept: OTHER | Age: 88
Setting detail: THERAPIES SERIES
Discharge: HOME | End: 2023-06-02